# Patient Record
Sex: FEMALE | Race: WHITE | NOT HISPANIC OR LATINO
[De-identification: names, ages, dates, MRNs, and addresses within clinical notes are randomized per-mention and may not be internally consistent; named-entity substitution may affect disease eponyms.]

---

## 2017-06-22 PROBLEM — Z00.00 ENCOUNTER FOR PREVENTIVE HEALTH EXAMINATION: Status: ACTIVE | Noted: 2017-06-22

## 2017-06-30 ENCOUNTER — OTHER (OUTPATIENT)
Age: 72
End: 2017-06-30

## 2017-07-11 ENCOUNTER — APPOINTMENT (OUTPATIENT)
Dept: NEUROSURGERY | Facility: CLINIC | Age: 72
End: 2017-07-11

## 2017-07-11 VITALS — SYSTOLIC BLOOD PRESSURE: 175 MMHG | DIASTOLIC BLOOD PRESSURE: 80 MMHG

## 2017-07-11 VITALS — SYSTOLIC BLOOD PRESSURE: 179 MMHG | DIASTOLIC BLOOD PRESSURE: 77 MMHG

## 2017-07-11 DIAGNOSIS — Z87.19 PERSONAL HISTORY OF OTHER DISEASES OF THE DIGESTIVE SYSTEM: ICD-10-CM

## 2017-07-11 DIAGNOSIS — Z82.61 FAMILY HISTORY OF ARTHRITIS: ICD-10-CM

## 2017-07-11 DIAGNOSIS — Z82.0 FAMILY HISTORY OF EPILEPSY AND OTHER DISEASES OF THE NERVOUS SYSTEM: ICD-10-CM

## 2017-07-11 DIAGNOSIS — Z82.49 FAMILY HISTORY OF ISCHEMIC HEART DISEASE AND OTHER DISEASES OF THE CIRCULATORY SYSTEM: ICD-10-CM

## 2017-07-11 DIAGNOSIS — Z63.4 DISAPPEARANCE AND DEATH OF FAMILY MEMBER: ICD-10-CM

## 2017-07-11 DIAGNOSIS — M33.20 POLYMYOSITIS, ORGAN INVOLVEMENT UNSPECIFIED: ICD-10-CM

## 2017-07-11 DIAGNOSIS — T36.8X5A ADVERSE EFFECT OF OTHER SYSTEMIC ANTIBIOTICS, INITIAL ENCOUNTER: ICD-10-CM

## 2017-07-11 DIAGNOSIS — Z83.518 FAMILY HISTORY OF OTHER SPECIFIED EYE DISORDER: ICD-10-CM

## 2017-07-11 SDOH — SOCIAL STABILITY - SOCIAL INSECURITY: DISSAPEARANCE AND DEATH OF FAMILY MEMBER: Z63.4

## 2018-04-12 ENCOUNTER — OTHER (OUTPATIENT)
Age: 73
End: 2018-04-12

## 2018-07-02 ENCOUNTER — APPOINTMENT (OUTPATIENT)
Dept: NEUROSURGERY | Facility: CLINIC | Age: 73
End: 2018-07-02
Payer: MEDICARE

## 2018-07-02 VITALS
WEIGHT: 132 LBS | OXYGEN SATURATION: 96 % | DIASTOLIC BLOOD PRESSURE: 74 MMHG | HEART RATE: 71 BPM | SYSTOLIC BLOOD PRESSURE: 167 MMHG | TEMPERATURE: 98.6 F

## 2018-07-02 PROCEDURE — 99203 OFFICE O/P NEW LOW 30 MIN: CPT

## 2018-07-02 RX ORDER — METOPROLOL TARTRATE 75 MG/1
TABLET, FILM COATED ORAL
Refills: 0 | Status: DISCONTINUED | COMMUNITY
End: 2018-07-02

## 2018-07-17 ENCOUNTER — OTHER (OUTPATIENT)
Age: 73
End: 2018-07-17

## 2018-07-19 ENCOUNTER — APPOINTMENT (OUTPATIENT)
Dept: NEUROSURGERY | Facility: CLINIC | Age: 73
End: 2018-07-19
Payer: MEDICARE

## 2018-07-19 VITALS
TEMPERATURE: 98 F | DIASTOLIC BLOOD PRESSURE: 95 MMHG | RESPIRATION RATE: 16 BRPM | WEIGHT: 132 LBS | SYSTOLIC BLOOD PRESSURE: 193 MMHG | HEART RATE: 64 BPM | OXYGEN SATURATION: 98 %

## 2018-07-19 PROCEDURE — 99214 OFFICE O/P EST MOD 30 MIN: CPT

## 2018-10-01 ENCOUNTER — APPOINTMENT (OUTPATIENT)
Dept: NEUROSURGERY | Facility: CLINIC | Age: 73
End: 2018-10-01
Payer: MEDICARE

## 2018-10-01 VITALS
HEART RATE: 65 BPM | HEIGHT: 65 IN | WEIGHT: 134 LBS | BODY MASS INDEX: 22.33 KG/M2 | OXYGEN SATURATION: 94 % | TEMPERATURE: 98.4 F | RESPIRATION RATE: 14 BRPM | DIASTOLIC BLOOD PRESSURE: 73 MMHG | SYSTOLIC BLOOD PRESSURE: 163 MMHG

## 2018-10-01 PROCEDURE — 99214 OFFICE O/P EST MOD 30 MIN: CPT

## 2018-10-24 ENCOUNTER — OTHER (OUTPATIENT)
Age: 73
End: 2018-10-24

## 2018-10-24 LAB — PA ADP PRP-ACNC: 175 PRU

## 2018-11-06 ENCOUNTER — APPOINTMENT (OUTPATIENT)
Dept: NEUROSURGERY | Facility: CLINIC | Age: 73
End: 2018-11-06
Payer: COMMERCIAL

## 2018-11-06 VITALS
SYSTOLIC BLOOD PRESSURE: 145 MMHG | RESPIRATION RATE: 17 BRPM | BODY MASS INDEX: 23.45 KG/M2 | HEART RATE: 88 BPM | TEMPERATURE: 97.8 F | WEIGHT: 137.38 LBS | DIASTOLIC BLOOD PRESSURE: 68 MMHG | HEIGHT: 63.98 IN

## 2018-11-06 PROCEDURE — 99214 OFFICE O/P EST MOD 30 MIN: CPT

## 2018-11-20 ENCOUNTER — TRANSCRIPTION ENCOUNTER (OUTPATIENT)
Age: 73
End: 2018-11-20

## 2018-11-20 ENCOUNTER — INPATIENT (INPATIENT)
Facility: HOSPITAL | Age: 73
LOS: 0 days | Discharge: HOME | DRG: 247 | End: 2018-11-21
Attending: INTERNAL MEDICINE | Admitting: INTERNAL MEDICINE
Payer: COMMERCIAL

## 2018-11-20 VITALS
DIASTOLIC BLOOD PRESSURE: 67 MMHG | TEMPERATURE: 98 F | SYSTOLIC BLOOD PRESSURE: 153 MMHG | RESPIRATION RATE: 16 BRPM | HEART RATE: 72 BPM | OXYGEN SATURATION: 97 %

## 2018-11-20 DIAGNOSIS — Z87.19 PERSONAL HISTORY OF OTHER DISEASES OF THE DIGESTIVE SYSTEM: Chronic | ICD-10-CM

## 2018-11-20 DIAGNOSIS — R07.9 CHEST PAIN, UNSPECIFIED: ICD-10-CM

## 2018-11-20 DIAGNOSIS — Z95.1 PRESENCE OF AORTOCORONARY BYPASS GRAFT: Chronic | ICD-10-CM

## 2018-11-20 DIAGNOSIS — Z95.5 PRESENCE OF CORONARY ANGIOPLASTY IMPLANT AND GRAFT: Chronic | ICD-10-CM

## 2018-11-20 LAB
ANION GAP SERPL CALC-SCNC: 10 MMOL/L — SIGNIFICANT CHANGE UP (ref 5–17)
APTT BLD: 31.6 SEC — SIGNIFICANT CHANGE UP (ref 27.5–36.3)
BASOPHILS # BLD AUTO: 0 K/UL — SIGNIFICANT CHANGE UP (ref 0–0.2)
BASOPHILS NFR BLD AUTO: 0.2 % — SIGNIFICANT CHANGE UP (ref 0–2)
BLD GP AB SCN SERPL QL: SIGNIFICANT CHANGE UP
BUN SERPL-MCNC: 12 MG/DL — SIGNIFICANT CHANGE UP (ref 8–20)
CALCIUM SERPL-MCNC: 9.9 MG/DL — SIGNIFICANT CHANGE UP (ref 8.6–10.2)
CHLORIDE SERPL-SCNC: 104 MMOL/L — SIGNIFICANT CHANGE UP (ref 98–107)
CO2 SERPL-SCNC: 25 MMOL/L — SIGNIFICANT CHANGE UP (ref 22–29)
CREAT SERPL-MCNC: 0.43 MG/DL — LOW (ref 0.5–1.3)
EOSINOPHIL # BLD AUTO: 0.1 K/UL — SIGNIFICANT CHANGE UP (ref 0–0.5)
EOSINOPHIL NFR BLD AUTO: 1.1 % — SIGNIFICANT CHANGE UP (ref 0–6)
GLUCOSE SERPL-MCNC: 112 MG/DL — SIGNIFICANT CHANGE UP (ref 70–115)
HCT VFR BLD CALC: 42.7 % — SIGNIFICANT CHANGE UP (ref 37–47)
HGB BLD-MCNC: 14.2 G/DL — SIGNIFICANT CHANGE UP (ref 12–16)
INR BLD: 1.09 RATIO — SIGNIFICANT CHANGE UP (ref 0.88–1.16)
LYMPHOCYTES # BLD AUTO: 1.4 K/UL — SIGNIFICANT CHANGE UP (ref 1–4.8)
LYMPHOCYTES # BLD AUTO: 25.2 % — SIGNIFICANT CHANGE UP (ref 20–55)
MCHC RBC-ENTMCNC: 30.9 PG — SIGNIFICANT CHANGE UP (ref 27–31)
MCHC RBC-ENTMCNC: 33.3 G/DL — SIGNIFICANT CHANGE UP (ref 32–36)
MCV RBC AUTO: 93 FL — SIGNIFICANT CHANGE UP (ref 81–99)
MONOCYTES # BLD AUTO: 0.5 K/UL — SIGNIFICANT CHANGE UP (ref 0–0.8)
MONOCYTES NFR BLD AUTO: 9.9 % — SIGNIFICANT CHANGE UP (ref 3–10)
NEUTROPHILS # BLD AUTO: 3.4 K/UL — SIGNIFICANT CHANGE UP (ref 1.8–8)
NEUTROPHILS NFR BLD AUTO: 63.4 % — SIGNIFICANT CHANGE UP (ref 37–73)
PLATELET # BLD AUTO: 233 K/UL — SIGNIFICANT CHANGE UP (ref 150–400)
POTASSIUM SERPL-MCNC: 4.4 MMOL/L — SIGNIFICANT CHANGE UP (ref 3.5–5.3)
POTASSIUM SERPL-SCNC: 4.4 MMOL/L — SIGNIFICANT CHANGE UP (ref 3.5–5.3)
PROTHROM AB SERPL-ACNC: 12.6 SEC — SIGNIFICANT CHANGE UP (ref 10–12.9)
RBC # BLD: 4.59 M/UL — SIGNIFICANT CHANGE UP (ref 4.4–5.2)
RBC # FLD: 13.2 % — SIGNIFICANT CHANGE UP (ref 11–15.6)
SODIUM SERPL-SCNC: 139 MMOL/L — SIGNIFICANT CHANGE UP (ref 135–145)
TYPE + AB SCN PNL BLD: SIGNIFICANT CHANGE UP
WBC # BLD: 5.4 K/UL — SIGNIFICANT CHANGE UP (ref 4.8–10.8)
WBC # FLD AUTO: 5.4 K/UL — SIGNIFICANT CHANGE UP (ref 4.8–10.8)

## 2018-11-20 PROCEDURE — 93010 ELECTROCARDIOGRAM REPORT: CPT

## 2018-11-20 RX ORDER — ATORVASTATIN CALCIUM 80 MG/1
40 TABLET, FILM COATED ORAL AT BEDTIME
Qty: 0 | Refills: 0 | Status: DISCONTINUED | OUTPATIENT
Start: 2018-11-20 | End: 2018-11-21

## 2018-11-20 RX ORDER — FLUTICASONE PROPIONATE 50 MCG
1 SPRAY, SUSPENSION NASAL DAILY
Qty: 0 | Refills: 0 | Status: DISCONTINUED | OUTPATIENT
Start: 2018-11-20 | End: 2018-11-21

## 2018-11-20 RX ORDER — INFLUENZA VIRUS VACCINE 15; 15; 15; 15 UG/.5ML; UG/.5ML; UG/.5ML; UG/.5ML
0.5 SUSPENSION INTRAMUSCULAR ONCE
Qty: 0 | Refills: 0 | Status: COMPLETED | OUTPATIENT
Start: 2018-11-20 | End: 2018-11-20

## 2018-11-20 RX ORDER — ASPIRIN/CALCIUM CARB/MAGNESIUM 324 MG
81 TABLET ORAL DAILY
Qty: 0 | Refills: 0 | Status: DISCONTINUED | OUTPATIENT
Start: 2018-11-20 | End: 2018-11-21

## 2018-11-20 RX ORDER — ACETAMINOPHEN 500 MG
1000 TABLET ORAL ONCE
Qty: 0 | Refills: 0 | Status: DISCONTINUED | OUTPATIENT
Start: 2018-11-20 | End: 2018-11-21

## 2018-11-20 RX ORDER — AMLODIPINE BESYLATE 2.5 MG/1
2.5 TABLET ORAL DAILY
Qty: 0 | Refills: 0 | Status: DISCONTINUED | OUTPATIENT
Start: 2018-11-20 | End: 2018-11-21

## 2018-11-20 RX ORDER — ATORVASTATIN CALCIUM 80 MG/1
10 TABLET, FILM COATED ORAL AT BEDTIME
Qty: 0 | Refills: 0 | Status: DISCONTINUED | OUTPATIENT
Start: 2018-11-20 | End: 2018-11-20

## 2018-11-20 RX ORDER — CLOPIDOGREL BISULFATE 75 MG/1
75 TABLET, FILM COATED ORAL DAILY
Qty: 0 | Refills: 0 | Status: DISCONTINUED | OUTPATIENT
Start: 2018-11-20 | End: 2018-11-21

## 2018-11-20 RX ADMIN — ATORVASTATIN CALCIUM 40 MILLIGRAM(S): 80 TABLET, FILM COATED ORAL at 22:00

## 2018-11-20 NOTE — H&P ADULT - PMH
CAD (coronary artery disease)    Chest pain Aortic stenosis    CAD (coronary artery disease)    Chest pain    COPD (chronic obstructive pulmonary disease)    Fibromyalgia    Hyperlipidemia    Hypothyroid    IBS (irritable bowel syndrome)    MVP (mitral valve prolapse)    PAD (peripheral artery disease)    Pancreatitis  2011  TIA (transient ischemic attack)

## 2018-11-20 NOTE — DISCHARGE NOTE ADULT - PLAN OF CARE
optimal cardiac function No heavy lifting, driving, sex, tub baths, swimming, or any activity that submerges the lower half of the body in water for 48 hours.  Limited walking and stairs for 48 hours.    Change the bandaid after 24 hours and every 24 hours after that.  Keep the puncture site dry and covered with a bandaid until a scab forms.    Observe the site frequently.  If bleeding or a large lump (the size of a golf ball or bigger) occurs lie flat, apply continuous direct pressure just above the puncture site for at least 10 minutes, and notify your physician immediately.  If the bleeding cannot be controlled, call 911 immediately for assistance.  Notify your physician of pain, swelling or any drainage.    Notify your physician immediately if coldness, numbness, discoloration or pain in your foot occurs. follow up with your primary MD within one week. Follow up with your Cardiologist in 7-10 days  Return to the Emergency dept. for any chest pain or shortness of breath Do not stop Aspirin or Plavix without speaking with cardiologist first

## 2018-11-20 NOTE — DISCHARGE NOTE ADULT - HOSPITAL COURSE
73 year old female h/o CABG 2015 followed by 2 stents to RCA 2016.  She presents with c/o SOB and chest heaviness.  Now s/p PCI with ABELARDO X1 LAD and ABELARDO X1 D1. 73 year old female h/o CABG 2015 followed by 2 stents to RCA 2016.  She presents with c/o SOB and chest heaviness.  Now s/p PCI with ABELARDO X1 LAD and ABELARDO X1 D1.  History of Present Illness:     73 year old female with h/o CABG in 2015 followed by cardiac stents to RCA in 2016 now presents to [facility] with c/o SOB and chest pressure.  Pt transferred to [facility] for LHC  SP: LHC which revealed:  ABELARDO D1 and DESpLAD  denies complaints of chest pain/sob/dizziness/palps overnight   tolerated diet and ambulated   had mild indigestion overnight gone now   HX: CAD with Cabg/ stents and pEF 65% / HTN/HLD/COPD Hypothyroid/TIA/AS  LHC 11/20/18: ABELARDO D1 and pLAD  Hemodynamically stable overnight labs and EKG reviewed/ mild indigestion resolved  R/o hematoma /pseudo R groin  increased tenderness radiates to her hip     Plan:  Continue present meds ASA/ Plavix/ Statin/ CCB no BB secondary to COPD/   sono Right groin will d/c post results  med compliance/ groin care and follow up discussed.   d/c tele    Sono Right groin no pseudo but small 1.7cm hematoma that was compressed till soft  pt tolerated well

## 2018-11-20 NOTE — DISCHARGE NOTE ADULT - MEDICATION SUMMARY - MEDICATIONS TO TAKE
I will START or STAY ON the medications listed below when I get home from the hospital:    aspirin 81 mg oral tablet  -- 1 tab(s) by mouth once a day  -- Indication: For CAD with stents     nitroglycerin 0.4 mg sublingual spray  -- 1 spray(s) under tongue every 5 minutes  -- Indication: For As needed chest pain    atorvastatin 40 mg oral tablet  -- 1 tab(s) by mouth once a day (at bedtime)  -- Indication: For HLD/ new stents    Plavix 75 mg oral tablet  -- 1 tab(s) by mouth once a day  -- Indication: For CAD wtih stents    Norvasc 2.5 mg oral tablet  -- 1 tab(s) by mouth once a day  -- Indication: For HTN    Flonase 50 mcg/inh nasal spray  -- 1 spray(s) into nose once a day  -- Indication: For COPD (chronic obstructive pulmonary disease)

## 2018-11-20 NOTE — DISCHARGE NOTE ADULT - NS AS ACTIVITY OBS
Stairs allowed/Walking-Outdoors allowed/Walking-Indoors allowed/Showering allowed/No Heavy lifting/straining

## 2018-11-20 NOTE — PROGRESS NOTE ADULT - SUBJECTIVE AND OBJECTIVE BOX
Nurse Practitioner Progress note:     INTERVAL HISTORY: 73 year old female with h/o CABG followed by cardiac stent, IBS, HLD, COPD, TIA, fibtomyalgia who c/o sob and chest heaviness.  She was transferred from [facility] for [facility]    MEDICATIONS:  amLODIPine   Tablet 2.5 milliGRAM(s) Oral daily  atorvastatin 40 milliGRAM(s) Oral at bedtime  aspirin  chewable 81 milliGRAM(s) Oral daily  clopidogrel Tablet 75 milliGRAM(s) Oral daily  fluticasone propionate 50 MICROgram(s)/spray Nasal Spray 1 Spray(s) Both Nostrils daily      TELEMETRY: NSR 62 bpm    T(C): 36.9 (11-20-18 @ 12:16), Max: 36.9 (11-20-18 @ 12:16)  HR: 62 (11-20-18 @ 16:50) (62 - 72)  BP: 158/79 (11-20-18 @ 16:50) (153/67 - 173/79)  RR: 16 (11-20-18 @ 16:50) (16 - 16)  SpO2: 98% (11-20-18 @ 16:50) (96% - 98%)  Wt(kg): --    PHYSICAL EXAM:  Appearance: Normal	  Cardiovascular: Normal S1 S2, No JVD, No murmurs, No edema  Respiratory: Lungs clear to auscultation	  Psychiatry: A & O x 3, Mood & affect appropriate  Neurologic: Non-focal, A&O X3.  No neuro deficits  Procedure Site: Right groin site benign.  No bleeding/hematoma/ecchymosis.  + palp pedal pulse.    12 lead EKG:  	NSR 64 bpm.  No acute changes    LABS:	 	                    14.2   5.4   )-----------( 233      ( 20 Nov 2018 12:35 )             42.7     11-20    139  |  104  |  12.0  ----------------------------<  112  4.4   |  25.0  |  0.43<L>    Ca    9.9      20 Nov 2018 12:35        PROCEDURE RESULTS: S/P PCI with ABELARDO X1 LAD and ABELARDO X1 D1 via right radial with angioseal closure    ASSESSMENT/PLAN:   -Admit to 4T	  -Groin precautions  -Bedrest X 3h  -Resume home meds  -Follow up with Dr. Ladd  -Check labs/EKG/site check in AM  -Probable discharge in AM

## 2018-11-20 NOTE — PATIENT PROFILE ADULT - NSASFALLNEEDSASSISTWITH_GEN_A_NUR
Patient is waiting to hear back from her MRI results. Please call when they are ready.    walking/standing

## 2018-11-20 NOTE — DISCHARGE NOTE ADULT - CARE PROVIDER_API CALL
Micheal Ladd), Cardiovascular Disease; Interventional Cardiology  [address] [place] [address]  Phone: [phone]  Fax: [phone]

## 2018-11-20 NOTE — DISCHARGE NOTE ADULT - CARE PLAN
Principal Discharge DX:	CAD (coronary artery disease)  Goal:	optimal cardiac function  Assessment and plan of treatment:	No heavy lifting, driving, sex, tub baths, swimming, or any activity that submerges the lower half of the body in water for 48 hours.  Limited walking and stairs for 48 hours.    Change the bandaid after 24 hours and every 24 hours after that.  Keep the puncture site dry and covered with a bandaid until a scab forms.    Observe the site frequently.  If bleeding or a large lump (the size of a golf ball or bigger) occurs lie flat, apply continuous direct pressure just above the puncture site for at least 10 minutes, and notify your physician immediately.  If the bleeding cannot be controlled, call 911 immediately for assistance.  Notify your physician of pain, swelling or any drainage.    Notify your physician immediately if coldness, numbness, discoloration or pain in your foot occurs. Principal Discharge DX:	CAD (coronary artery disease)  Goal:	optimal cardiac function  Assessment and plan of treatment:	No heavy lifting, driving, sex, tub baths, swimming, or any activity that submerges the lower half of the body in water for 48 hours.  Limited walking and stairs for 48 hours.    Change the bandaid after 24 hours and every 24 hours after that.  Keep the puncture site dry and covered with a bandaid until a scab forms.    Observe the site frequently.  If bleeding or a large lump (the size of a golf ball or bigger) occurs lie flat, apply continuous direct pressure just above the puncture site for at least 10 minutes, and notify your physician immediately.  If the bleeding cannot be controlled, call 911 immediately for assistance.  Notify your physician of pain, swelling or any drainage.    Notify your physician immediately if coldness, numbness, discoloration or pain in your foot occurs.  Assessment and plan of treatment:	follow up with your primary MD within one week. Follow up with your Cardiologist in 7-10 days  Return to the Emergency dept. for any chest pain or shortness of breath  Assessment and plan of treatment:	Do not stop Aspirin or Plavix without speaking with cardiologist first

## 2018-11-20 NOTE — H&P ADULT - FAMILY HISTORY
Father  Still living? No  Family history of heart disease, Age at diagnosis: Age Unknown     Mother  Still living? Yes, Estimated age: 96  Family history of heart disease, Age at diagnosis: Age Unknown

## 2018-11-20 NOTE — DISCHARGE NOTE ADULT - PATIENT PORTAL LINK FT
You can access the [email or web address] Patient Portal, offered by [facility], by registering with the following website: [email or web address]

## 2018-11-20 NOTE — H&P ADULT - NSHPPHYSICALEXAM_GEN_ALL_CORE
ROS:  Resp: Denies dyspnea or SOB at this time  CV: Denies chest pain, palpitations, AGEE  GI: No black/bloody stools  : No hematuria  Heme: No bleeding/bruising problems  Neuro: Denies dizziness    Physical Exam:  Gen: Awake, alert, in no acute distress  Chest: CTA B/L, S1, S2, no murmur, RRR  Abd: Soft +BS  Ext: No edema, + distal pulses  Neuro: A&OX3

## 2018-11-20 NOTE — H&P ADULT - HISTORY OF PRESENT ILLNESS
73 year old female 73 year old female with h/o CABG in 2015 followed by cardiac stents to RCA in 2016 now presents to [facility] with c/o SOB and chest pressure.  Pt transferred to [facility] for LHC

## 2018-11-20 NOTE — H&P ADULT - ASSESSMENT
73 year old female transfer from [facility][1] with c/o chest pain.  For [facility]

## 2018-11-21 VITALS
TEMPERATURE: 98 F | HEART RATE: 68 BPM | RESPIRATION RATE: 18 BRPM | OXYGEN SATURATION: 98 % | DIASTOLIC BLOOD PRESSURE: 64 MMHG | SYSTOLIC BLOOD PRESSURE: 106 MMHG

## 2018-11-21 LAB
ABO RH CONFIRMATION: SIGNIFICANT CHANGE UP
ANION GAP SERPL CALC-SCNC: 14 MMOL/L — SIGNIFICANT CHANGE UP (ref 5–17)
BUN SERPL-MCNC: 18 MG/DL — SIGNIFICANT CHANGE UP (ref 8–20)
CALCIUM SERPL-MCNC: 9.3 MG/DL — SIGNIFICANT CHANGE UP (ref 8.6–10.2)
CHLORIDE SERPL-SCNC: 103 MMOL/L — SIGNIFICANT CHANGE UP (ref 98–107)
CO2 SERPL-SCNC: 22 MMOL/L — SIGNIFICANT CHANGE UP (ref 22–29)
CREAT SERPL-MCNC: 0.39 MG/DL — LOW (ref 0.5–1.3)
GLUCOSE SERPL-MCNC: 94 MG/DL — SIGNIFICANT CHANGE UP (ref 70–115)
HCT VFR BLD CALC: 41.4 % — SIGNIFICANT CHANGE UP (ref 37–47)
HGB BLD-MCNC: 13.7 G/DL — SIGNIFICANT CHANGE UP (ref 12–16)
MAGNESIUM SERPL-MCNC: 2.1 MG/DL — SIGNIFICANT CHANGE UP (ref 1.8–2.6)
MCHC RBC-ENTMCNC: 31 PG — SIGNIFICANT CHANGE UP (ref 27–31)
MCHC RBC-ENTMCNC: 33.1 G/DL — SIGNIFICANT CHANGE UP (ref 32–36)
MCV RBC AUTO: 93.7 FL — SIGNIFICANT CHANGE UP (ref 81–99)
PLATELET # BLD AUTO: 215 K/UL — SIGNIFICANT CHANGE UP (ref 150–400)
POTASSIUM SERPL-MCNC: 4 MMOL/L — SIGNIFICANT CHANGE UP (ref 3.5–5.3)
POTASSIUM SERPL-SCNC: 4 MMOL/L — SIGNIFICANT CHANGE UP (ref 3.5–5.3)
RBC # BLD: 4.42 M/UL — SIGNIFICANT CHANGE UP (ref 4.4–5.2)
RBC # FLD: 13.3 % — SIGNIFICANT CHANGE UP (ref 11–15.6)
SODIUM SERPL-SCNC: 139 MMOL/L — SIGNIFICANT CHANGE UP (ref 135–145)
WBC # BLD: 5.1 K/UL — SIGNIFICANT CHANGE UP (ref 4.8–10.8)
WBC # FLD AUTO: 5.1 K/UL — SIGNIFICANT CHANGE UP (ref 4.8–10.8)

## 2018-11-21 PROCEDURE — 86900 BLOOD TYPING SEROLOGIC ABO: CPT

## 2018-11-21 PROCEDURE — C9600: CPT | Mod: LD

## 2018-11-21 PROCEDURE — 93926 LOWER EXTREMITY STUDY: CPT | Mod: 26,RT

## 2018-11-21 PROCEDURE — 93005 ELECTROCARDIOGRAM TRACING: CPT

## 2018-11-21 PROCEDURE — 93926 LOWER EXTREMITY STUDY: CPT

## 2018-11-21 PROCEDURE — C1760: CPT

## 2018-11-21 PROCEDURE — 99152 MOD SED SAME PHYS/QHP 5/>YRS: CPT

## 2018-11-21 PROCEDURE — 80048 BASIC METABOLIC PNL TOTAL CA: CPT

## 2018-11-21 PROCEDURE — C9601: CPT | Mod: LD

## 2018-11-21 PROCEDURE — C1894: CPT

## 2018-11-21 PROCEDURE — 93459 L HRT ART/GRFT ANGIO: CPT | Mod: XU

## 2018-11-21 PROCEDURE — C1887: CPT

## 2018-11-21 PROCEDURE — 99153 MOD SED SAME PHYS/QHP EA: CPT

## 2018-11-21 PROCEDURE — 85610 PROTHROMBIN TIME: CPT

## 2018-11-21 PROCEDURE — C1725: CPT

## 2018-11-21 PROCEDURE — C1874: CPT

## 2018-11-21 PROCEDURE — 86850 RBC ANTIBODY SCREEN: CPT

## 2018-11-21 PROCEDURE — 36415 COLL VENOUS BLD VENIPUNCTURE: CPT

## 2018-11-21 PROCEDURE — 83735 ASSAY OF MAGNESIUM: CPT

## 2018-11-21 PROCEDURE — 85730 THROMBOPLASTIN TIME PARTIAL: CPT

## 2018-11-21 PROCEDURE — 94640 AIRWAY INHALATION TREATMENT: CPT

## 2018-11-21 PROCEDURE — C1769: CPT

## 2018-11-21 PROCEDURE — 85027 COMPLETE CBC AUTOMATED: CPT

## 2018-11-21 PROCEDURE — 86901 BLOOD TYPING SEROLOGIC RH(D): CPT

## 2018-11-21 PROCEDURE — 93010 ELECTROCARDIOGRAM REPORT: CPT

## 2018-11-21 RX ORDER — ATORVASTATIN CALCIUM 80 MG/1
1 TABLET, FILM COATED ORAL
Qty: 30 | Refills: 5
Start: 2018-11-21 | End: 2019-05-19

## 2018-11-21 RX ADMIN — Medication 1 SPRAY(S): at 10:20

## 2018-11-21 RX ADMIN — CLOPIDOGREL BISULFATE 75 MILLIGRAM(S): 75 TABLET, FILM COATED ORAL at 09:07

## 2018-11-21 RX ADMIN — Medication 81 MILLIGRAM(S): at 09:09

## 2018-11-21 RX ADMIN — AMLODIPINE BESYLATE 2.5 MILLIGRAM(S): 2.5 TABLET ORAL at 09:07

## 2018-11-21 NOTE — PROGRESS NOTE ADULT - SUBJECTIVE AND OBJECTIVE BOX
SUBJECTIVE:  Cardiology NP F/U note:  SP: [facility] which revealed:  ABELARDO D1 and DESpLAD  denies complaints of chest pain/sob/dizziness/palps overnight   tolerated diet and ambulated   had mild indigestion overnight gone now     	  MEDICATIONS:  amLODIPine   Tablet 2.5 milliGRAM(s) Oral daily  acetaminophen  IVPB .. 1000 milliGRAM(s) IV Intermittent once  atorvastatin 40 milliGRAM(s) Oral at bedtime  aspirin  chewable 81 milliGRAM(s) Oral daily  clopidogrel Tablet 75 milliGRAM(s) Oral daily  fluticasone propionate 50 MICROgram(s)/spray Nasal Spray 1 Spray(s) Both Nostrils daily  influenza   Vaccine 0.5 milliLiter(s) IntraMuscular once        PHYSICAL EXAM:    T(C): 36.9 (18 @ 06:55), Max: 36.9 (18 @ 12:16)  HR: 62 (18 @ 06:55) (62 - 72)  BP: 124/56 (18 @ 06:55) (107/68 - 173/79)  RR: 16 (18 @ 06:55) (16 - 16)  SpO2: 98% (18 @ 06:55) (96% - 98%)  Wt(kg): --    I&O's Summary    2018 07:01  -  2018 07:00  --------------------------------------------------------  IN: 370 mL / OUT: 0 mL / NET: 370 mL        Daily     Daily Weight in k.9 (2018 05:49)    Appearance: Normal	  HEENT:   Normal oral mucosa,   Lymphatic: No lymphadenopathy  Cardiovascular: Normal S1 S2,RRR 70 No JVD, No murmurs, No edema  Respiratory: Lungs clear to auscultation	  Psychiatry: A & O x 3, Mood & affect appropriate  Gastrointestinal:  Soft, Non-tender, + BS	  Skin: warm and dry  Neurologic: Non-focal  Extremities: Normal range of motion,:  Right Groin firm with increased tenderness radiating to her hip   dressing removed no active bleeding   Vascular: Peripheral pulses palpable 2+ bilaterally    TELEMETRY: 	  RSR 70's no events   ECG:  	RSR B 56 normal record  RADIOLOGY:   DIAGNOSTIC TESTING:  [ ] Echocardiogram:  [ X]  Catheterization:  < from: Cardiac Cath Lab - Adult (18 @ 15:04) >  VENTRICLES: There were no left ventricular global or regional wall motion  abnormalities. Global left ventricular function was normal. EF estimated  was 65 %.  VALVES: MITRAL VALVE: The mitral valve exhibited no regurgitation.  CORONARY VESSELS: The coronary circulation is right dominant.  LM:   --  LM: There was a tubular 0 % stenosis at the site of a prior  stent.  LAD:   --  Ostial LAD: There was a tubular 0 % stenosis at the site of a  prior stent.  --  Proximal LAD: There was a tubular 70 % stenosis.  --  Mid LAD: There was a 100 % stenosis.  --  D1: There was a tubular 85 % stenosis.  CX:   --  Proximal circumflex: There was a tubular 0 % stenosis at the site  of a prior stent.  RI:   --  Proximal ramus intermedius: There was a 100 % stenosis.  RCA:   --  Distal RCA: There was a tubular 20 % stenosis at the proximal  margin of the stented segment. In a second lesion, there was a discrete 10  % stenosis at the site of a prior stent.  --  RPDA: There was a tubular 25 % stenosis at the site of a prior stent.  GRAFTS:   --  Graft to the mid LAD: The graft was a LIMA. It was normal.  --  Graft to the ramus intermedius: The graft was a saphenous vein graft  from the aorta. Graft angiography showed minor luminal irregularities.  --  Graft to the RPDA: The graft was a saphenous vein graft from the aorta.  There was a 100 % stenosis at the proximal anastomosis.  COMPLICATIONS: No complications occurred during the cath lab visit.  SUMMARY:  1ST LESION INTERVENTIONS: A successful drug-eluting stent was performed on  the 85 % lesion in the 1st diagonal. Following intervention there was an  excellent angiographic appearance with a 0 % residual stenosis.  2ND LESION INTERVENTIONS: A successful drug-eluting stent was performed on  the 70 % lesion in the proximal LAD. Following intervention there was an  excellent angiographic appearance with a 0 % residual stenosis.  INTERVENTIONAL RECOMMENDATIONS: Add clopidogrel (Plavix), 75 mg, PO, daily.  Add aspirin, 81 mg, PO, daily.    < end of copied text >    [ ] Stress Test:    OTHER: 	    LABS:	 	    CARDIAC MARKERS:                                  13.7   5.1   )-----------( 215      ( 2018 05:53 )             41.4         139  |  103  |  18.0  ----------------------------<  94  4.0   |  22.0  |  0.39<L>    Ca    9.3      2018 05:53  Mg     2.1           ASSESSMENT:  73 year old female with h/o CABG in  followed by cardiac stents to RCA in  now presents to [facility] with c/o SOB and chest pressure.  Pt transferred to [facility] for C  HX: CAD with Cabg/ stents and pEF 65% / HTN/HLD/COPD Hypothyroid/TIA/AS  C 18: ABELARDO D1 and pLAD  Hemodynamically stable overnight labs and EKG reviewed/ mild indigestion resolved  R/o hematoma /pseudo R groin  increased tenderness radiates to her hip     Plan:  Continue present meds ASA/ Plavix/ Statin/ CCB no BB secondary to COPD/   sono Right groin will d/c post results  med compliance/ groin care and follow up discussed.   d/c tele SUBJECTIVE:  Cardiology NP F/U note:  SP: [facility] which revealed:  ABELARDO D1 and DESpLAD  denies complaints of chest pain/sob/dizziness/palps overnight   tolerated diet and ambulated   had mild indigestion overnight gone now     	  MEDICATIONS:  amLODIPine   Tablet 2.5 milliGRAM(s) Oral daily  acetaminophen  IVPB .. 1000 milliGRAM(s) IV Intermittent once  atorvastatin 40 milliGRAM(s) Oral at bedtime  aspirin  chewable 81 milliGRAM(s) Oral daily  clopidogrel Tablet 75 milliGRAM(s) Oral daily  fluticasone propionate 50 MICROgram(s)/spray Nasal Spray 1 Spray(s) Both Nostrils daily  influenza   Vaccine 0.5 milliLiter(s) IntraMuscular once        PHYSICAL EXAM:    T(C): 36.9 (18 @ 06:55), Max: 36.9 (18 @ 12:16)  HR: 62 (18 @ 06:55) (62 - 72)  BP: 124/56 (18 @ 06:55) (107/68 - 173/79)  RR: 16 (18 @ 06:55) (16 - 16)  SpO2: 98% (18 @ 06:55) (96% - 98%)  Wt(kg): --    I&O's Summary    2018 07:01  -  2018 07:00  --------------------------------------------------------  IN: 370 mL / OUT: 0 mL / NET: 370 mL        Daily     Daily Weight in k.9 (2018 05:49)    Appearance: Normal	  HEENT:   Normal oral mucosa,   Lymphatic: No lymphadenopathy  Cardiovascular: Normal S1 S2,RRR 70 No JVD, No murmurs, No edema  Respiratory: Lungs clear to auscultation	  Psychiatry: A & O x 3, Mood & affect appropriate  Gastrointestinal:  Soft, Non-tender, + BS	  Skin: warm and dry  Neurologic: Non-focal  Extremities: Normal range of motion,:  Right Groin firm with increased tenderness radiating to her hip   dressing removed no active bleeding   Vascular: Peripheral pulses palpable 2+ bilaterally    TELEMETRY: 	  RSR 70's no events   ECG:  	RSR B 56 normal record  RADIOLOGY:   DIAGNOSTIC TESTING:  [ ] Echocardiogram:  [ X]  Catheterization:  < from: Cardiac Cath Lab - Adult (18 @ 15:04) >  VENTRICLES: There were no left ventricular global or regional wall motion  abnormalities. Global left ventricular function was normal. EF estimated  was 65 %.  VALVES: MITRAL VALVE: The mitral valve exhibited no regurgitation.  CORONARY VESSELS: The coronary circulation is right dominant.  LM:   --  LM: There was a tubular 0 % stenosis at the site of a prior  stent.  LAD:   --  Ostial LAD: There was a tubular 0 % stenosis at the site of a  prior stent.  --  Proximal LAD: There was a tubular 70 % stenosis.  --  Mid LAD: There was a 100 % stenosis.  --  D1: There was a tubular 85 % stenosis.  CX:   --  Proximal circumflex: There was a tubular 0 % stenosis at the site  of a prior stent.  RI:   --  Proximal ramus intermedius: There was a 100 % stenosis.  RCA:   --  Distal RCA: There was a tubular 20 % stenosis at the proximal  margin of the stented segment. In a second lesion, there was a discrete 10  % stenosis at the site of a prior stent.  --  RPDA: There was a tubular 25 % stenosis at the site of a prior stent.  GRAFTS:   --  Graft to the mid LAD: The graft was a LIMA. It was normal.  --  Graft to the ramus intermedius: The graft was a saphenous vein graft  from the aorta. Graft angiography showed minor luminal irregularities.  --  Graft to the RPDA: The graft was a saphenous vein graft from the aorta.  There was a 100 % stenosis at the proximal anastomosis.  COMPLICATIONS: No complications occurred during the cath lab visit.  SUMMARY:  1ST LESION INTERVENTIONS: A successful drug-eluting stent was performed on  the 85 % lesion in the 1st diagonal. Following intervention there was an  excellent angiographic appearance with a 0 % residual stenosis.  2ND LESION INTERVENTIONS: A successful drug-eluting stent was performed on  the 70 % lesion in the proximal LAD. Following intervention there was an  excellent angiographic appearance with a 0 % residual stenosis.  INTERVENTIONAL RECOMMENDATIONS: Add clopidogrel (Plavix), 75 mg, PO, daily.  Add aspirin, 81 mg, PO, daily.    < end of copied text >    [ ] Stress Test:    OTHER: 	    LABS:	 	    CARDIAC MARKERS:                                  13.7   5.1   )-----------( 215      ( 2018 05:53 )             41.4         139  |  103  |  18.0  ----------------------------<  94  4.0   |  22.0  |  0.39<L>    Ca    9.3      2018 05:53  Mg     2.1           ASSESSMENT:  73 year old female with h/o CABG in  followed by cardiac stents to RCA in  now presents to [facility] with c/o SOB and chest pressure.  Pt transferred to [facility] for C  HX: CAD with Cabg/ stents and pEF 65% / HTN/HLD/COPD Hypothyroid/TIA/AS  LHC 18: ABELARDO D1 and pLAD  Hemodynamically stable overnight labs and EKG reviewed/ mild indigestion resolved  R/o hematoma /pseudo R groin  increased tenderness radiates to her hip     Plan:  Continue present meds ASA/ Plavix/ Statin/ CCB no BB secondary to COPD/   sono Right groin will d/c post results  med compliance/ groin care and follow up discussed.   d/c tele  SONO: sm hematoma 1.7cm compressed till soft .

## 2018-11-26 ENCOUNTER — APPOINTMENT (OUTPATIENT)
Dept: OBGYN | Facility: CLINIC | Age: 73
End: 2018-11-26

## 2018-11-26 ENCOUNTER — INPATIENT (INPATIENT)
Facility: HOSPITAL | Age: 73
LOS: 1 days | Discharge: HOME | DRG: 392 | End: 2018-11-28
Attending: FAMILY MEDICINE | Admitting: INTERNAL MEDICINE
Payer: COMMERCIAL

## 2018-11-26 VITALS
HEART RATE: 72 BPM | TEMPERATURE: 98 F | RESPIRATION RATE: 18 BRPM | WEIGHT: 134.04 LBS | DIASTOLIC BLOOD PRESSURE: 85 MMHG | HEIGHT: 65 IN | SYSTOLIC BLOOD PRESSURE: 182 MMHG | OXYGEN SATURATION: 97 %

## 2018-11-26 DIAGNOSIS — Z95.1 PRESENCE OF AORTOCORONARY BYPASS GRAFT: Chronic | ICD-10-CM

## 2018-11-26 DIAGNOSIS — Z95.5 PRESENCE OF CORONARY ANGIOPLASTY IMPLANT AND GRAFT: Chronic | ICD-10-CM

## 2018-11-26 DIAGNOSIS — Z87.19 PERSONAL HISTORY OF OTHER DISEASES OF THE DIGESTIVE SYSTEM: Chronic | ICD-10-CM

## 2018-11-26 PROBLEM — I35.0 NONRHEUMATIC AORTIC (VALVE) STENOSIS: Chronic | Status: ACTIVE | Noted: 2018-11-20

## 2018-11-26 PROBLEM — G45.9 TRANSIENT CEREBRAL ISCHEMIC ATTACK, UNSPECIFIED: Chronic | Status: ACTIVE | Noted: 2018-11-20

## 2018-11-26 PROBLEM — J44.9 CHRONIC OBSTRUCTIVE PULMONARY DISEASE, UNSPECIFIED: Chronic | Status: ACTIVE | Noted: 2018-11-20

## 2018-11-26 PROBLEM — E03.9 HYPOTHYROIDISM, UNSPECIFIED: Chronic | Status: ACTIVE | Noted: 2018-11-20

## 2018-11-26 PROBLEM — K58.9 IRRITABLE BOWEL SYNDROME, UNSPECIFIED: Chronic | Status: ACTIVE | Noted: 2018-11-20

## 2018-11-26 PROBLEM — R07.9 CHEST PAIN, UNSPECIFIED: Chronic | Status: ACTIVE | Noted: 2018-11-20

## 2018-11-26 PROBLEM — K85.90 ACUTE PANCREATITIS WITHOUT NECROSIS OR INFECTION, UNSPECIFIED: Chronic | Status: ACTIVE | Noted: 2018-11-20

## 2018-11-26 PROBLEM — I34.1 NONRHEUMATIC MITRAL (VALVE) PROLAPSE: Chronic | Status: ACTIVE | Noted: 2018-11-20

## 2018-11-26 PROBLEM — K58.9 IRRITABLE BOWEL SYNDROME WITHOUT DIARRHEA: Chronic | Status: ACTIVE | Noted: 2018-11-20

## 2018-11-26 PROBLEM — M79.7 FIBROMYALGIA: Chronic | Status: ACTIVE | Noted: 2018-11-20

## 2018-11-26 PROBLEM — I73.9 PERIPHERAL VASCULAR DISEASE, UNSPECIFIED: Chronic | Status: ACTIVE | Noted: 2018-11-20

## 2018-11-26 LAB
ANION GAP SERPL CALC-SCNC: 13 MMOL/L — SIGNIFICANT CHANGE UP (ref 5–17)
APTT BLD: 26.2 SEC — LOW (ref 27.5–36.3)
BASOPHILS # BLD AUTO: 0 K/UL — SIGNIFICANT CHANGE UP (ref 0–0.2)
BASOPHILS NFR BLD AUTO: 0.3 % — SIGNIFICANT CHANGE UP (ref 0–2)
BUN SERPL-MCNC: 14 MG/DL — SIGNIFICANT CHANGE UP (ref 8–20)
CALCIUM SERPL-MCNC: 10 MG/DL — SIGNIFICANT CHANGE UP (ref 8.6–10.2)
CHLORIDE SERPL-SCNC: 104 MMOL/L — SIGNIFICANT CHANGE UP (ref 98–107)
CO2 SERPL-SCNC: 22 MMOL/L — SIGNIFICANT CHANGE UP (ref 22–29)
CREAT SERPL-MCNC: 0.35 MG/DL — LOW (ref 0.5–1.3)
EOSINOPHIL # BLD AUTO: 0.1 K/UL — SIGNIFICANT CHANGE UP (ref 0–0.5)
EOSINOPHIL NFR BLD AUTO: 1.5 % — SIGNIFICANT CHANGE UP (ref 0–6)
GLUCOSE SERPL-MCNC: 92 MG/DL — SIGNIFICANT CHANGE UP (ref 70–115)
HCT VFR BLD CALC: 39.1 % — SIGNIFICANT CHANGE UP (ref 37–47)
HGB BLD-MCNC: 13 G/DL — SIGNIFICANT CHANGE UP (ref 12–16)
INR BLD: 1.07 RATIO — SIGNIFICANT CHANGE UP (ref 0.88–1.16)
LYMPHOCYTES # BLD AUTO: 1.8 K/UL — SIGNIFICANT CHANGE UP (ref 1–4.8)
LYMPHOCYTES # BLD AUTO: 27.4 % — SIGNIFICANT CHANGE UP (ref 20–55)
MCHC RBC-ENTMCNC: 30.5 PG — SIGNIFICANT CHANGE UP (ref 27–31)
MCHC RBC-ENTMCNC: 33.2 G/DL — SIGNIFICANT CHANGE UP (ref 32–36)
MCV RBC AUTO: 91.8 FL — SIGNIFICANT CHANGE UP (ref 81–99)
MONOCYTES # BLD AUTO: 0.6 K/UL — SIGNIFICANT CHANGE UP (ref 0–0.8)
MONOCYTES NFR BLD AUTO: 9.3 % — SIGNIFICANT CHANGE UP (ref 3–10)
NEUTROPHILS # BLD AUTO: 4 K/UL — SIGNIFICANT CHANGE UP (ref 1.8–8)
NEUTROPHILS NFR BLD AUTO: 61.3 % — SIGNIFICANT CHANGE UP (ref 37–73)
NT-PROBNP SERPL-SCNC: 223 PG/ML — SIGNIFICANT CHANGE UP (ref 0–300)
PLATELET # BLD AUTO: 241 K/UL — SIGNIFICANT CHANGE UP (ref 150–400)
POTASSIUM SERPL-MCNC: 3.8 MMOL/L — SIGNIFICANT CHANGE UP (ref 3.5–5.3)
POTASSIUM SERPL-SCNC: 3.8 MMOL/L — SIGNIFICANT CHANGE UP (ref 3.5–5.3)
PROTHROM AB SERPL-ACNC: 12.3 SEC — SIGNIFICANT CHANGE UP (ref 10–12.9)
RBC # BLD: 4.26 M/UL — LOW (ref 4.4–5.2)
RBC # FLD: 13.1 % — SIGNIFICANT CHANGE UP (ref 11–15.6)
SODIUM SERPL-SCNC: 139 MMOL/L — SIGNIFICANT CHANGE UP (ref 135–145)
TROPONIN T SERPL-MCNC: 0.02 NG/ML — SIGNIFICANT CHANGE UP (ref 0–0.06)
WBC # BLD: 6.5 K/UL — SIGNIFICANT CHANGE UP (ref 4.8–10.8)
WBC # FLD AUTO: 6.5 K/UL — SIGNIFICANT CHANGE UP (ref 4.8–10.8)

## 2018-11-26 PROCEDURE — 93010 ELECTROCARDIOGRAM REPORT: CPT

## 2018-11-26 PROCEDURE — 71045 X-RAY EXAM CHEST 1 VIEW: CPT | Mod: 26

## 2018-11-26 PROCEDURE — 99285 EMERGENCY DEPT VISIT HI MDM: CPT

## 2018-11-26 NOTE — ED PROVIDER NOTE - MEDICAL DECISION MAKING DETAILS
exertional cp after ercent cath will need cardiac rule out a/w trouble aswallowing htn medication hasd her asa today too high risk detioration if sent home pt and pts daughter agree with this

## 2018-11-26 NOTE — ED PROVIDER NOTE - CONSTITUTIONAL, MLM
normal... Thin appearing, awake, alert, oriented to person, place, time/situation and in no apparent distress.

## 2018-11-26 NOTE — ED PROVIDER NOTE - OBJECTIVE STATEMENT
72 y/o M pt with hx of CAD with stents, CABG x3, COPD, HLD, hypothyroidism, pancreatitis, TIA, appendectomy, cholecystectomy presents to ED c/o CP and SOB that has gradually worsened since stent placement by Dr. Jha 6 days ago. She describe CP as a heaviness and has had a cough for the last few days. Pt notes difficulty swallowing and difficulty breathing through her nose due to phlegm. Pt's daughter states she has felt very weak, she was almost too tired to receive communion at [employer] yesterday. Pt's daughter also notes orthopnea. denies fever. denies HA or neck pain. no hemoptysis. no abd pain. no n/v/d. no urinary f/u/d. no back pain. no motor or sensory deficits. denies illicit drug use. no recent travel. no rash. no other acute issues symptoms or concerns   Cardio: Dr. Spence

## 2018-11-26 NOTE — ED ADULT NURSE NOTE - NSIMPLEMENTINTERV_GEN_ALL_ED
Called pt, discussed Dr Waterman recommendations, he would like Dr Waterman to prescribed it for him.     New Rx for Pletal called in to [facility]    Implemented All Universal Safety Interventions:  [place] to call system. Call bell, personal items and telephone within reach. Instruct patient to call for assistance. Room bathroom lighting operational. Non-slip footwear when patient is off stretcher. Physically safe environment: no spills, clutter or unnecessary equipment. Stretcher in lowest position, wheels locked, appropriate side rails in place.

## 2018-11-26 NOTE — ED ADULT TRIAGE NOTE - CHIEF COMPLAINT QUOTE
Pt presents with daughter ambulatory with c/o shortness of breath and difficulty swallowing. Pt had 2 cardiac stents placed on Tuesday by Dr. Ellison.

## 2018-11-26 NOTE — ED PROVIDER NOTE - PMH
Aortic stenosis    CAD (coronary artery disease)    Chest pain    COPD (chronic obstructive pulmonary disease)    Fibromyalgia    Hyperlipidemia    Hypothyroid    IBS (irritable bowel syndrome)    MVP (mitral valve prolapse)    PAD (peripheral artery disease)    Pancreatitis  2011  TIA (transient ischemic attack)

## 2018-11-26 NOTE — ED ADULT NURSE NOTE - OBJECTIVE STATEMENT
pt came in c/o chest pressure and SOB since two weeks ago since pt got two stents placed here at [place]. pt in no current distress or pain at this time. pt relates pain to a muscle ache. VSS afebrile. pt on RA saturating 97%. pt received surgery through right femoral, clean dry intact. pt educated on plan of care, pt able to successfully teach back plan of care to RN, RN will continue to reeducate pt during hospital stay.

## 2018-11-27 DIAGNOSIS — I24.9 ACUTE ISCHEMIC HEART DISEASE, UNSPECIFIED: ICD-10-CM

## 2018-11-27 LAB
BLD GP AB SCN SERPL QL: SIGNIFICANT CHANGE UP
TROPONIN T SERPL-MCNC: <0.01 NG/ML — SIGNIFICANT CHANGE UP (ref 0–0.06)
TROPONIN T SERPL-MCNC: <0.01 NG/ML — SIGNIFICANT CHANGE UP (ref 0–0.06)
TYPE + AB SCN PNL BLD: SIGNIFICANT CHANGE UP

## 2018-11-27 PROCEDURE — 12345: CPT | Mod: NC,GC

## 2018-11-27 PROCEDURE — 99223 1ST HOSP IP/OBS HIGH 75: CPT

## 2018-11-27 PROCEDURE — 70491 CT SOFT TISSUE NECK W/DYE: CPT | Mod: 26

## 2018-11-27 PROCEDURE — 74230 X-RAY XM SWLNG FUNCJ C+: CPT | Mod: 26

## 2018-11-27 RX ORDER — ENOXAPARIN SODIUM 100 MG/ML
40 INJECTION SUBCUTANEOUS DAILY
Qty: 0 | Refills: 0 | Status: DISCONTINUED | OUTPATIENT
Start: 2018-11-27 | End: 2018-11-28

## 2018-11-27 RX ORDER — PANTOPRAZOLE SODIUM 20 MG/1
40 TABLET, DELAYED RELEASE ORAL
Qty: 0 | Refills: 0 | Status: DISCONTINUED | OUTPATIENT
Start: 2018-11-27 | End: 2018-11-28

## 2018-11-27 RX ORDER — LEVOTHYROXINE SODIUM 125 MCG
125 TABLET ORAL DAILY
Qty: 0 | Refills: 0 | Status: DISCONTINUED | OUTPATIENT
Start: 2018-11-27 | End: 2018-11-28

## 2018-11-27 RX ORDER — ATORVASTATIN CALCIUM 80 MG/1
40 TABLET, FILM COATED ORAL AT BEDTIME
Qty: 0 | Refills: 0 | Status: DISCONTINUED | OUTPATIENT
Start: 2018-11-27 | End: 2018-11-28

## 2018-11-27 RX ORDER — IPRATROPIUM/ALBUTEROL SULFATE 18-103MCG
3 AEROSOL WITH ADAPTER (GRAM) INHALATION EVERY 6 HOURS
Qty: 0 | Refills: 0 | Status: DISCONTINUED | OUTPATIENT
Start: 2018-11-27 | End: 2018-11-28

## 2018-11-27 RX ORDER — ASPIRIN/CALCIUM CARB/MAGNESIUM 324 MG
81 TABLET ORAL DAILY
Qty: 0 | Refills: 0 | Status: DISCONTINUED | OUTPATIENT
Start: 2018-11-27 | End: 2018-11-28

## 2018-11-27 RX ORDER — CLOPIDOGREL BISULFATE 75 MG/1
75 TABLET, FILM COATED ORAL DAILY
Qty: 0 | Refills: 0 | Status: DISCONTINUED | OUTPATIENT
Start: 2018-11-27 | End: 2018-11-28

## 2018-11-27 RX ORDER — AMLODIPINE BESYLATE 2.5 MG/1
2.5 TABLET ORAL DAILY
Qty: 0 | Refills: 0 | Status: DISCONTINUED | OUTPATIENT
Start: 2018-11-27 | End: 2018-11-28

## 2018-11-27 RX ORDER — INFLUENZA VIRUS VACCINE 15; 15; 15; 15 UG/.5ML; UG/.5ML; UG/.5ML; UG/.5ML
0.5 SUSPENSION INTRAMUSCULAR ONCE
Qty: 0 | Refills: 0 | Status: COMPLETED | OUTPATIENT
Start: 2018-11-27 | End: 2018-11-27

## 2018-11-27 RX ADMIN — Medication 125 MICROGRAM(S): at 05:14

## 2018-11-27 RX ADMIN — ATORVASTATIN CALCIUM 40 MILLIGRAM(S): 80 TABLET, FILM COATED ORAL at 22:25

## 2018-11-27 RX ADMIN — AMLODIPINE BESYLATE 2.5 MILLIGRAM(S): 2.5 TABLET ORAL at 05:14

## 2018-11-27 RX ADMIN — ENOXAPARIN SODIUM 40 MILLIGRAM(S): 100 INJECTION SUBCUTANEOUS at 18:02

## 2018-11-27 NOTE — SWALLOW BEDSIDE ASSESSMENT ADULT - SWALLOW EVAL: DIAGNOSIS
Oral and pharyngeal stage of swallow judged to be WFL for thin liquids. Suspect pharyngeal dysphagia for puree consistencies, pt reports a globus sensation in throat area.

## 2018-11-27 NOTE — SWALLOW VFSS/MBS ASSESSMENT ADULT - PHARYNGEAL PHASE COMMENTS
Noted po moving around the outer parameter of soft tissue located around C2-C3 of the pharynx. Pt continues to c/o globus sensation in pharynx despite no stasis noted

## 2018-11-27 NOTE — ED ADULT NURSE REASSESSMENT NOTE - NS ED NURSE REASSESS COMMENT FT1
Patient received at 0700; awake; alert and oriented x4. Denies any pain or discomfort this time. Denies SOB, dizziness. No distress noted. VSS. Respirations unlabored. Report received at bedside. Cardiac monitor in place. NSR. Call bell and personal items in reach. Continue to monitor patient and maintain safety.

## 2018-11-27 NOTE — CONSULT NOTE ADULT - ASSESSMENT
In short, this is a 73-year-old woman with significant cardiovascular disease (CAD, PAD, TIA) and COPD who presents with complaints of chest heaviness/pressure and new onset dysphagia.  The etiology of her dysphagia is not clear based on the available data and her description of her symptoms.  Coupled with her chest heaviness, this may be simply an upper respiratory infection with increased mucus viscosity causing her dysphagia.  Pill esophagitis or worsened GERD are also a possibilities  Given the recent PCI/stent placement and chest heaviness, direct visualization with an endoscope may not prudent at this time.  Noninvasive means of evaluation with a MBSS and CXR are appropriate first steps.  Cardiology clearance for an EGD would be an absolute necessity prior to planning said procedure.    Recommendations:  - 2-view chest x-ray  - Modified barium swallow study with SLP evaluation  - Start pantoprazole 40 mg daily

## 2018-11-27 NOTE — SWALLOW BEDSIDE ASSESSMENT ADULT - SLP PERTINENT HISTORY OF CURRENT PROBLEM
As per h&p: ED complaining of chest pressure x 1 week. Patient said she had a 2 stents placed a week ago and she has been having chest pressure since then; now worsening. Chest pressure is on the mid chest, not exertional, non-radiating. She reported associated shortness of breath. Patient also reported difficulty swallowing x 1 day and choking sensation with solid food.Pt reports yesterday eating breakfast, hardboiled egg with toast & was unable to swallow the food , started to choke with globus sensation in the throat area & has had difficulty swallowing since. Pt also reports unable to clear phlegm in throat area and feels more stuffy in the nares. Pt also reports vocal hoarseness since choking episode Thin liquids are easier to swallow per pt.

## 2018-11-27 NOTE — H&P ADULT - HISTORY OF PRESENT ILLNESS
72 y/o patient with PMHx of CAD with stents, CABG x3, HTN, COPD,  hypothyroidism, came to the ED complaining of chest pressure x 1 week. Patient said she had a 2 stents placed a week ago and she has been having chest pressure since then; now worsening. Chest pressure is on the mid chest, not exertional, non-radiating. She reported associated shortness of breath. Patient also reported difficulty swallowing x 1 day and choking sensation with solid food. Patient has no fever, diaphoresis, palpitation, abdominal pain, nausea/vomiting, change in bowel/ urinary habit, HA, no recent travel, leg swelling.

## 2018-11-27 NOTE — SWALLOW BEDSIDE ASSESSMENT ADULT - SLP GENERAL OBSERVATIONS
Received pt in bed awake, alert Ox4. Currently reports a globus sensation in throat area prior to trials being given

## 2018-11-27 NOTE — CONSULT NOTE ADULT - SUBJECTIVE AND OBJECTIVE BOX
HISTORY OF PRESENT ILLNESS: This is a 73y old Female with a past medical history significant for coronary artery disease s/p recent PCI, CABG, PAD, AS, MVP, COPD, and PUD who presented to the ED with complaints of worsening chest pressure that began during her previous hospitalization after her stent placements and dysphagia.  The patient reports that the dysphagia is a new symptom for her.  She reports that she has developed a significant difficulty swallowing solids, including food and pills.  She also carries a diagnosis of GERD for which she has been taking Pepcid for some time.  She denies any regurgitation of foods but does report water brash.  On the day of presentation, while eating her breakfast (toast and a hard boiled egg) she felt as though the food was stuck in her throat, causing her to gag and feel acutely dyspneic.  Over time, her symptoms subsided, but nothing specifically seems to make her symptoms better or worse.  Currently, she feels she can only tolerate water.  Lastly, she reports hoarseness, another new symptom for her.  She reports having undergone numerous EGDs and colonoscopies in the past, most recently about 7 years ago.    REVIEW OF SYSTEMS:  Constitutional:  No unintentional weight loss, fevers, chills or night sweats	  Eyes: No eye pain, redness, discharge, or proptosis  ENMT: No sore throat, ear pain, mouth sores, or swollen glands in the neck  Respiratory: No dyspnea, cough or wheezing  Cardiovascular: No chest pain, dyspnea on exertion, or orthopnea  Gastrointestinal:	Please see HPI  Genitourinary: No dysuria or hematuria  Neurological:	 No changes in sleep/wake cycle, convulsions, confusion, dizziness or lightheadedness  Psychiatric: No changes in personality or emotional problems   Hematology: No easy bruising   Endocrine: No hot or cold flashes or deepening of voice	  All other review of systems were completed and were otherwise negative save what is reported in the HPI.    PAST MEDICAL/SURGICAL HISTORY:  PAD (peripheral artery disease)  MVP (mitral valve prolapse)  Aortic stenosis  Pancreatitis: 2011  Fibromyalgia  Hypothyroid  TIA (transient ischemic attack)  COPD (chronic obstructive pulmonary disease)  Hyperlipidemia  IBS (irritable bowel syndrome)  CAD (coronary artery disease)  Chest pain  H/O appendicitis  H/O cholecystitis  Coronary stent patent: 2016 RCA  S/P CABG x 3: 2015    SOCIAL HISTORY:  - TOBACCO: Quit smoking 1989  - ALCOHOL: Denies  - ILLICIT DRUG USE: Denies    FAMILY HISTORY:  No known history of gastrointestinal or liver disease;  Family history of heart disease (Father)    HOME MEDICATIONS:  aspirin 81 mg oral tablet: 1 tab(s) orally once a day (20 Nov 2018 12:32)  Flonase 50 mcg/inh nasal spray: 1 spray(s) nasal once a day (20 Nov 2018 12:32)  nitroglycerin 0.4 mg sublingual spray: 1 spray(s) sublingual every 5 minutes (20 Nov 2018 12:20)  Norvasc 2.5 mg oral tablet: 1 tab(s) orally once a day (20 Nov 2018 12:32)  Plavix 75 mg oral tablet: 1 tab(s) orally once a day (20 Nov 2018 12:32)    INPATIENT MEDICATIONS:  MEDICATIONS  (STANDING):  amLODIPine   Tablet 2.5 milliGRAM(s) Oral daily  aspirin  chewable 81 milliGRAM(s) Oral daily  atorvastatin 40 milliGRAM(s) Oral at bedtime  clopidogrel Tablet 75 milliGRAM(s) Oral daily  enoxaparin Injectable 40 milliGRAM(s) SubCutaneous daily  levothyroxine 125 MICROGram(s) Oral daily    MEDICATIONS  (PRN):  ALBUTerol/ipratropium for Nebulization 3 milliLiter(s) Nebulizer every 6 hours PRN Shortness of Breath and/or Wheezing    ALLERGIES:  Bactrim (Rash)  chocolate (Other)  Cipro (Rash)  codeine (Rash)  sulfa drugs (Faint)    VITAL SIGNS LAST 24 HOURS:  T(C): 36.7 (27 Nov 2018 05:14), Max: 36.8 (26 Nov 2018 20:58)  T(F): 98.1 (27 Nov 2018 05:14), Max: 98.2 (26 Nov 2018 20:58)  HR: 62 (27 Nov 2018 05:14) (62 - 72)  BP: 148/72 (27 Nov 2018 05:14) (148/72 - 182/85)  RR: 18 (27 Nov 2018 05:14) (18 - 18)  SpO2: 97% (27 Nov 2018 05:14) (97% - 97%)    PHYSICAL EXAM:  Constitutional: Well-developed, well-nourished, elderly  woman in no apparent distress  Eyes: Sclerae anicteric, conjunctivae normal  ENMT: Mucus membranes moist, no oropharyngeal thrush noted; no significant PND  Neck: No thyroid nodules appreciated, no significant cervical or supraclavicular lymphadenopathy  Respiratory: Breathing nonlabored; clear to auscultation  Cardiovascular: Regular rate and rhythm  Gastrointestinal: Soft, nontender, nondistended, normoactive bowel sounds; no hepatosplenomegaly appreciated; no rebound tenderness or involuntary guarding  Extremities: No clubbing, cyanosis or edema  Neurological: Alert and oriented to person, place and time; no asterixis  Skin: No jaundice  Lymph Nodes: No significant lymphadenopathy  Musculoskeletal: No significant peripheral atrophy  Psychiatric: Affect and mood appropriate    LABS:                        13.0   6.5   )-----------( 241      ( 26 Nov 2018 22:58 )             39.1     PT/INR - ( 26 Nov 2018 22:58 )   PT: 12.3 sec;   INR: 1.07 ratio         PTT - ( 26 Nov 2018 22:58 )  PTT:26.2 sec  11-26    139  |  104  |  14.0  ----------------------------<  92  3.8   |  22.0  |  0.35<L>    Ca    10.0      26 Nov 2018 22:58    IMAGING: I personally reviewed the portable CXR and do not appreciate any airspace disease or pulmonary congestion.  Radiologist's read is pending.

## 2018-11-27 NOTE — SWALLOW VFSS/MBS ASSESSMENT ADULT - DIAGNOSTIC IMPRESSIONS
Oral and pharyngeal stage of swallow judged to be WFL. Prior to po trials noted a raised soft tissue in the pharynx (C2-C3) After each trial of po pt continued to c/o of a globus sensation in the pharynx even though there was no visible po in the pharynx.

## 2018-11-27 NOTE — H&P ADULT - ASSESSMENT
72 y/o patient with PMHx of CAD with stents, CABG x3, HTN, COPD,  hypothyroidism, came to the ED complaining of chest pressure x 1 week. Patient said she had a 2 stents placed a week ago and she has been having chest pressure since then; now worsening. Chest pressure is on the mid chest, not exertional, non-radiating. She reported associated shortness of breath. Patient also reported difficulty swallowing x 1 day and choking sensation with solid food. Patient has no fever, diaphoresis, palpitation, abdominal pain, nausea/vomiting, change in bowel/ urinary habit, HA, no recent travel, leg swelling.     Chest pain r/o ACS   Admit to telemetry   Troponin x1 negative, will trend   Continue Aspirin 81mg   Plavix 75mg   Atorvastatin 40mg   Cardiology consulted in the ED (Dr. Ladd)    Dysphagia   Patient reported difficulty swallowing solid   GI consult     CAD s/p stents  Continue Aspirin 81mg   Plavix 75mg     Hypothyroidism  Continue Synthroid 125mcg     HTN  Continue Amlodipine 2.5mg     COPD   Duoneb PRN     Supportive  DVT prophylaxis: Lovenox 40mg   Diet: NPO for now

## 2018-11-27 NOTE — SWALLOW VFSS/MBS ASSESSMENT ADULT - RECOMMENDED FEEDING/EATING TECHNIQUES
oral hygiene/small sips/bites/provide rest periods between swallows/crush medication (when feasible)/position upright (90 degrees)

## 2018-11-27 NOTE — PROGRESS NOTE ADULT - ASSESSMENT
72 y/o female with PMHx of CAD with stents, CABG x3, HTN, COPD,  hypothyroidism, came to the ED complaining of chest pressure x 1 week - due to neck fullness    1. Neck fullness with SOB/chest pressure - ? soft tissue mass in pharynx based on MBS, ENT eval requested, diet changed based on SS eval recommendations, cont PPI    2. CAD s/p CABG x3 and PCIs x3 last 2 weeks ago - cont medical management    3. Hypothyroidism - synthroid    4. VTE prophylaxis LMWH

## 2018-11-28 ENCOUNTER — TRANSCRIPTION ENCOUNTER (OUTPATIENT)
Age: 73
End: 2018-11-28

## 2018-11-28 VITALS
RESPIRATION RATE: 17 BRPM | OXYGEN SATURATION: 96 % | SYSTOLIC BLOOD PRESSURE: 142 MMHG | DIASTOLIC BLOOD PRESSURE: 70 MMHG | HEART RATE: 75 BPM

## 2018-11-28 DIAGNOSIS — R13.10 DYSPHAGIA, UNSPECIFIED: ICD-10-CM

## 2018-11-28 LAB
ANION GAP SERPL CALC-SCNC: 11 MMOL/L — SIGNIFICANT CHANGE UP (ref 5–17)
BUN SERPL-MCNC: 13 MG/DL — SIGNIFICANT CHANGE UP (ref 8–20)
CALCIUM SERPL-MCNC: 9.4 MG/DL — SIGNIFICANT CHANGE UP (ref 8.6–10.2)
CHLORIDE SERPL-SCNC: 102 MMOL/L — SIGNIFICANT CHANGE UP (ref 98–107)
CO2 SERPL-SCNC: 23 MMOL/L — SIGNIFICANT CHANGE UP (ref 22–29)
CREAT SERPL-MCNC: 0.39 MG/DL — LOW (ref 0.5–1.3)
GLUCOSE SERPL-MCNC: 86 MG/DL — SIGNIFICANT CHANGE UP (ref 70–115)
POTASSIUM SERPL-MCNC: 3.9 MMOL/L — SIGNIFICANT CHANGE UP (ref 3.5–5.3)
POTASSIUM SERPL-SCNC: 3.9 MMOL/L — SIGNIFICANT CHANGE UP (ref 3.5–5.3)
SODIUM SERPL-SCNC: 136 MMOL/L — SIGNIFICANT CHANGE UP (ref 135–145)

## 2018-11-28 PROCEDURE — 93005 ELECTROCARDIOGRAM TRACING: CPT

## 2018-11-28 PROCEDURE — 84484 ASSAY OF TROPONIN QUANT: CPT

## 2018-11-28 PROCEDURE — 86850 RBC ANTIBODY SCREEN: CPT

## 2018-11-28 PROCEDURE — 85610 PROTHROMBIN TIME: CPT

## 2018-11-28 PROCEDURE — 71045 X-RAY EXAM CHEST 1 VIEW: CPT

## 2018-11-28 PROCEDURE — 74230 X-RAY XM SWLNG FUNCJ C+: CPT

## 2018-11-28 PROCEDURE — 85027 COMPLETE CBC AUTOMATED: CPT

## 2018-11-28 PROCEDURE — 36415 COLL VENOUS BLD VENIPUNCTURE: CPT

## 2018-11-28 PROCEDURE — 86901 BLOOD TYPING SEROLOGIC RH(D): CPT

## 2018-11-28 PROCEDURE — 86900 BLOOD TYPING SEROLOGIC ABO: CPT

## 2018-11-28 PROCEDURE — 70491 CT SOFT TISSUE NECK W/DYE: CPT

## 2018-11-28 PROCEDURE — 92611 MOTION FLUOROSCOPY/SWALLOW: CPT

## 2018-11-28 PROCEDURE — 99239 HOSP IP/OBS DSCHRG MGMT >30: CPT

## 2018-11-28 PROCEDURE — 83880 ASSAY OF NATRIURETIC PEPTIDE: CPT

## 2018-11-28 PROCEDURE — 92610 EVALUATE SWALLOWING FUNCTION: CPT

## 2018-11-28 PROCEDURE — 99233 SBSQ HOSP IP/OBS HIGH 50: CPT

## 2018-11-28 PROCEDURE — 80048 BASIC METABOLIC PNL TOTAL CA: CPT

## 2018-11-28 PROCEDURE — 99285 EMERGENCY DEPT VISIT HI MDM: CPT | Mod: 25

## 2018-11-28 PROCEDURE — 85730 THROMBOPLASTIN TIME PARTIAL: CPT

## 2018-11-28 RX ORDER — LEVOTHYROXINE SODIUM 125 MCG
1 TABLET ORAL
Qty: 0 | Refills: 0 | DISCHARGE
Start: 2018-11-28

## 2018-11-28 RX ORDER — LANSOPRAZOLE 15 MG/1
1 CAPSULE, DELAYED RELEASE ORAL
Qty: 0 | Refills: 2 | DISCHARGE
Start: 2018-11-28 | End: 2019-02-25

## 2018-11-28 RX ORDER — LANSOPRAZOLE 15 MG/1
1 CAPSULE, DELAYED RELEASE ORAL
Qty: 30 | Refills: 2 | OUTPATIENT
Start: 2018-11-28 | End: 2019-02-25

## 2018-11-28 RX ORDER — PANTOPRAZOLE SODIUM 20 MG/1
1 TABLET, DELAYED RELEASE ORAL
Qty: 30 | Refills: 2 | OUTPATIENT
Start: 2018-11-28 | End: 2019-02-25

## 2018-11-28 RX ORDER — LANSOPRAZOLE 15 MG/1
1 CAPSULE, DELAYED RELEASE ORAL
Qty: 0 | Refills: 0 | COMMUNITY

## 2018-11-28 RX ADMIN — CLOPIDOGREL BISULFATE 75 MILLIGRAM(S): 75 TABLET, FILM COATED ORAL at 11:08

## 2018-11-28 RX ADMIN — AMLODIPINE BESYLATE 2.5 MILLIGRAM(S): 2.5 TABLET ORAL at 05:42

## 2018-11-28 RX ADMIN — Medication 81 MILLIGRAM(S): at 11:08

## 2018-11-28 RX ADMIN — Medication 125 MICROGRAM(S): at 05:42

## 2018-11-28 NOTE — PROGRESS NOTE ADULT - ASSESSMENT
74 y/o female with PMHx of CAD with stents, CABG x3, HTN, COPD,  hypothyroidism, came to the ED complaining of chest pressure x 1 week - due to neck fullness    1. Neck fullness with SOB/chest pressure - ? soft tissue mass in pharynx based on MBS, ENT eval requested, diet changed based on SS eval recommendations, cont PPI   - ENT eval did not reveal any mass as well as CT neck with IV contrast - pt to continue pureed diet, resume prevacid (allergic to protonix), flonase and humidifier at home. F/u with GI within a week and if no improvement outpatient reevaluation with ENT may be needed    2. CAD s/p CABG x3 and PCIs x3 last 2 weeks ago - cont medical management, pt is high risk for any procedure under anesthesia, but if needed may proceed    3. Hypothyroidism - synthroid    4. VTE prophylaxis LMWH

## 2018-11-28 NOTE — PROGRESS NOTE ADULT - REASON FOR ADMISSION
Chest pressure and dysphagia

## 2018-11-28 NOTE — DISCHARGE NOTE ADULT - HOSPITAL COURSE
· Assessment		  72 y/o female with PMHx of CAD with stents, CABG x3, HTN, COPD,  hypothyroidism, came to the ED complaining of chest pressure x 1 week - due to neck fullness    1. Neck fullness with SOB/chest pressure - ? soft tissue mass in pharynx based on MBS, ENT eval requested, diet changed based on SS eval recommendations, cont PPI   - ENT eval did not reveal any mass as well as CT neck with IV contrast - pt to continue pureed diet, resume prevacid (allergic to protonix), flonase and humidifier at home. F/u with GI within a week and if no improvement outpatient reevaluation with ENT may be needed    2. CAD s/p CABG x3 and PCIs x3 last 2 weeks ago - cont medical management, pt is high risk for any procedure under anesthesia, but if needed may proceed    3. Hypothyroidism - synthroid    Medically stable for DC  Time spent 55 min

## 2018-11-28 NOTE — PROGRESS NOTE ADULT - PROBLEM SELECTOR PLAN 1
swallow test WNL. neck CT negative and ENT exam normal  - Pureed diet ( pt requested puree as opposed to regular)  - May D/C home from GI stanpoint. Please give rx of prevacid 30 mg  qd which she has taken in the past  - F/U with Dr andrew in 4 weeks

## 2018-11-28 NOTE — PROGRESS NOTE ADULT - SUBJECTIVE AND OBJECTIVE BOX
Patient is a 73y old  Female who presents with a chief complaint of Chest pressure and dysphagia (27 Nov 2018 18:33)      HPI:  74 y/o patient with PMHx of CAD with stents, CABG x3, HTN, COPD,  hypothyroidism, came to the ED complaining of chest pressure x 1 week. Patient said she had a 2 stents placed a week ago and she has been having chest pressure since then; now worsening.  + globus sensation. Had negative neck CT.  Speech felt there maybe phargeal lesion, but ENT exam was negative for lesion. MBS was WNL. Pt however wants a pureed diet which she is tolerating.  Refused protonix as protonix in past made her vomiting    REVIEW OF SYSTEMS:  Constitutional: No fever, weight loss or fatigue  ENMT:  No difficulty hearing, tinnitus, vertigo; No sinus or throat pain  Respiratory: No cough, wheezing, chills or hemoptysis  Cardiovascular: No chest pain, palpitations, dizziness or leg swelling  Gastrointestinal: No abdominal or epigastric pain. No nausea, vomiting or hematemesis; No diarrhea or constipation. No melena or hematochezia + globus sensation  Skin: No itching, burning, rashes or lesions   Musculoskeletal: No joint pain or swelling; No muscle, back or extremity pain    PAST MEDICAL & SURGICAL HISTORY:  PAD (peripheral artery disease)  MVP (mitral valve prolapse)  Aortic stenosis  Pancreatitis: 2011  Fibromyalgia  Hypothyroid  TIA (transient ischemic attack)  COPD (chronic obstructive pulmonary disease)  Hyperlipidemia  IBS (irritable bowel syndrome)  CAD (coronary artery disease)  Chest pain  H/O appendicitis  H/O cholecystitis  Coronary stent patent: 2016 RCA  S/P CABG x 3: 2015      FAMILY HISTORY:  Family history of heart disease (Father)      SOCIAL HISTORY:  Smoking Status: [ ] Current, [ ] Former, [ ] Never  Pack Years:  [  ] EtOH-no  [  ] IVDA-no    MEDICATIONS:  MEDICATIONS  (STANDING):  amLODIPine   Tablet 2.5 milliGRAM(s) Oral daily  aspirin  chewable 81 milliGRAM(s) Oral daily  atorvastatin 40 milliGRAM(s) Oral at bedtime  clopidogrel Tablet 75 milliGRAM(s) Oral daily  enoxaparin Injectable 40 milliGRAM(s) SubCutaneous daily  influenza   Vaccine 0.5 milliLiter(s) IntraMuscular once  levothyroxine 125 MICROGram(s) Oral daily  pantoprazole    Tablet 40 milliGRAM(s) Oral before breakfast    MEDICATIONS  (PRN):  ALBUTerol/ipratropium for Nebulization 3 milliLiter(s) Nebulizer every 6 hours PRN Shortness of Breath and/or Wheezing      Allergies    Bactrim (Rash)  chocolate (Other)  Cipro (Rash)  codeine (Rash)  sulfa drugs (Faint)    Intolerances        Vital Signs Last 24 Hrs  T(C): 36.8 (28 Nov 2018 05:39), Max: 36.8 (27 Nov 2018 11:15)  T(F): 98.2 (28 Nov 2018 05:39), Max: 98.2 (27 Nov 2018 11:15)  HR: 64 (28 Nov 2018 05:39) (61 - 64)  BP: 120/68 (28 Nov 2018 05:39) (120/68 - 149/68)  BP(mean): --  RR: 18 (28 Nov 2018 05:39) (17 - 18)  SpO2: 85% (28 Nov 2018 05:39) (85% - 97%)    11-27 @ 07:01  -  11-28 @ 07:00  --------------------------------------------------------  IN: 0 mL / OUT: 900 mL / NET: -900 mL          PHYSICAL EXAM:    General: Well developed; well nourished; in no acute distress  HEENT: MMM, conjunctiva and sclera clear  H- RRR  L- CTA  Gastrointestinal: Soft, non-tender non-distended; Normal bowel sounds; No rebound or guarding  Extremities: Normal range of motion, No clubbing, cyanosis or edema  Neurological: Alert and oriented x3  Skin: Warm and dry. No obvious rash      LABS:                        13.0   6.5   )-----------( 241      ( 26 Nov 2018 22:58 )             39.1     28 Nov 2018 06:13    136    |  102    |  13.0   ----------------------------<  86     3.9     |  23.0   |  0.39     Ca    9.4        28 Nov 2018 06:13                RADIOLOGY & ADDITIONAL STUDIES:
CC: Chest pressure and dysphagia (27 Nov 2018 06:55)    HPI: 72 y/o patient with PMHx of CAD with stents, CABG x3, HTN, COPD,  hypothyroidism, came to the ED complaining of chest pressure x 1 week. Patient said she had a 2 stents placed a week ago and she has been having chest pressure since then; now worsening. Chest pressure is on the mid chest, not exertional, non-radiating. She reported associated shortness of breath. Patient also reported difficulty swallowing x 1 day and choking sensation with solid food. Patient has no fever, diaphoresis, palpitation, abdominal pain, nausea/vomiting, change in bowel/ urinary habit, HA, no recent travel, leg swelling. (27 Nov 2018 04:01)    INTERVAL HPI/OVERNIGHT EVENTS: no CP, pt continue to c/o neck sensation associated with difficulty swallowing and breathing  Other ROS reviewed and neg     Vital Signs Last 24 Hrs  T(C): 36.8 (27 Nov 2018 11:15), Max: 37.1 (27 Nov 2018 08:01)  T(F): 98.2 (27 Nov 2018 11:15), Max: 98.7 (27 Nov 2018 08:01)  HR: 62 (27 Nov 2018 11:15) (62 - 72)  BP: 149/68 (27 Nov 2018 11:15) (148/72 - 182/85)  RR: 17 (27 Nov 2018 11:15) (17 - 18)  SpO2: 94% (27 Nov 2018 11:15) (94% - 97%)    CARDIAC MARKERS ( 27 Nov 2018 12:10 )  x     / <0.01 ng/mL / x     / x     / x      CARDIAC MARKERS ( 27 Nov 2018 04:16 )  x     / <0.01 ng/mL / x     / x     / x      CARDIAC MARKERS ( 26 Nov 2018 22:58 )  x     / 0.02 ng/mL / x     / x     / x                            13.0   6.5   )-----------( 241      ( 26 Nov 2018 22:58 )             39.1     26 Nov 2018 22:58    139    |  104    |  14.0   ----------------------------<  92     3.8     |  22.0   |  0.35     Ca    10.0       26 Nov 2018 22:58    PT/INR - ( 26 Nov 2018 22:58 )   PT: 12.3 sec;   INR: 1.07 ratio       PTT - ( 26 Nov 2018 22:58 )  PTT:26.2 sec    MEDICATIONS  (STANDING):  amLODIPine   Tablet 2.5 milliGRAM(s) Oral daily  aspirin  chewable 81 milliGRAM(s) Oral daily  atorvastatin 40 milliGRAM(s) Oral at bedtime  clopidogrel Tablet 75 milliGRAM(s) Oral daily  enoxaparin Injectable 40 milliGRAM(s) SubCutaneous daily  influenza   Vaccine 0.5 milliLiter(s) IntraMuscular once  levothyroxine 125 MICROGram(s) Oral daily  pantoprazole    Tablet 40 milliGRAM(s) Oral before breakfast    MEDICATIONS  (PRN):  ALBUTerol/ipratropium for Nebulization 3 milliLiter(s) Nebulizer every 6 hours PRN Shortness of Breath and/or Wheezing    RADIOLOGY & ADDITIONAL TESTS: personally visualized    PHYSICAL EXAM:    General: elderly female in no acute distress  Eyes: PERRLA, EOMI; conjunctiva and sclera clear  Head: Normocephalic; atraumatic  ENMT: No nasal discharge; airway clear  Neck: Supple; non tender; no masses  Respiratory: No wheezes, rales or rhonchi  Cardiovascular: Regular rate and rhythm. S1 and S2  Gastrointestinal: Soft non-tender non-distended; Normal bowel sounds  Genitourinary: No costovertebral angle tenderness  Extremities: No clubbing, cyanosis or edema  Vascular: Peripheral pulses palpable 2+ bilaterally  Neurological: Alert and oriented x3  Skin: Warm and dry.   Musculoskeletal: Normal tone, without deformities  Psychiatric: Cooperative and appropriate
CC: Chest pressure and dysphagia (27 Nov 2018 06:55)    HPI: 74 y/o patient with PMHx of CAD with stents, CABG x3, HTN, COPD,  hypothyroidism, came to the ED complaining of chest pressure x 1 week. Patient said she had a 2 stents placed a week ago and she has been having chest pressure since then; now worsening. Chest pressure is on the mid chest, not exertional, non-radiating. She reported associated shortness of breath. Patient also reported difficulty swallowing x 1 day and choking sensation with solid food. Patient has no fever, diaphoresis, palpitation, abdominal pain, nausea/vomiting, change in bowel/ urinary habit, HA, no recent travel, leg swelling. (27 Nov 2018 04:01)    INTERVAL HPI/OVERNIGHT EVENTS: no CP, pt continue to c/o neck sensation associated with difficulty swallowing and breathing  Other ROS reviewed and neg     Vital Signs Last 24 Hrs  T(C): 36.3 (28 Nov 2018 09:57), Max: 36.8 (27 Nov 2018 16:07)  T(F): 97.3 (28 Nov 2018 09:57), Max: 98.2 (27 Nov 2018 16:07)  HR: 70 (28 Nov 2018 09:57) (61 - 70)  BP: 120/55 (28 Nov 2018 09:57) (120/55 - 136/75)  RR: 18 (28 Nov 2018 09:57) (18 - 18)  SpO2: 98% (28 Nov 2018 09:57) (85% - 98%)    CARDIAC MARKERS ( 27 Nov 2018 12:10 )  x     / <0.01 ng/mL / x     / x     / x      CARDIAC MARKERS ( 27 Nov 2018 04:16 )  x     / <0.01 ng/mL / x     / x     / x      CARDIAC MARKERS ( 26 Nov 2018 22:58 )  x     / 0.02 ng/mL / x     / x     / x                           13.0   6.5   )-----------( 241      ( 26 Nov 2018 22:58 )             39.1     28 Nov 2018 06:13    136    |  102    |  13.0   ----------------------------<  86     3.9     |  23.0   |  0.39     Ca    9.4        28 Nov 2018 06:13    PT/INR - ( 26 Nov 2018 22:58 )   PT: 12.3 sec;   INR: 1.07 ratio      PTT - ( 26 Nov 2018 22:58 )  PTT:26.2 sec    MEDICATIONS  (STANDING):  amLODIPine   Tablet 2.5 milliGRAM(s) Oral daily  aspirin  chewable 81 milliGRAM(s) Oral daily  atorvastatin 40 milliGRAM(s) Oral at bedtime  clopidogrel Tablet 75 milliGRAM(s) Oral daily  enoxaparin Injectable 40 milliGRAM(s) SubCutaneous daily  influenza   Vaccine 0.5 milliLiter(s) IntraMuscular once  levothyroxine 125 MICROGram(s) Oral daily  pantoprazole    Tablet 40 milliGRAM(s) Oral before breakfast    MEDICATIONS  (PRN):  ALBUTerol/ipratropium for Nebulization 3 milliLiter(s) Nebulizer every 6 hours PRN Shortness of Breath and/or Wheezing    RADIOLOGY & ADDITIONAL TESTS: personally visualized    PHYSICAL EXAM:    General: elderly female in no acute distress  Eyes: PERRLA, EOMI; conjunctiva and sclera clear  Head: Normocephalic; atraumatic  ENMT: No nasal discharge; airway clear  Neck: Supple; non tender; no masses  Respiratory: No wheezes, rales or rhonchi  Cardiovascular: Regular rate and rhythm. S1 and S2  Gastrointestinal: Soft non-tender non-distended; Normal bowel sounds  Genitourinary: No costovertebral angle tenderness  Extremities: No clubbing, cyanosis or edema  Vascular: Peripheral pulses palpable 2+ bilaterally  Neurological: Alert and oriented x3  Skin: Warm and dry.   Musculoskeletal: Normal tone, without deformities  Psychiatric: Cooperative and appropriate
ENT consult - No pharyngeal, hypopharyngeal or laryngeal lesions or masses seen on my physical examination and on fiberoptic laryngoscopy. See dictated consult report.

## 2018-11-28 NOTE — DISCHARGE NOTE ADULT - PLAN OF CARE
resolution Pureed diet, resume prevacid, flonase and humidify air at home  F/u with GI continue current medications synthroid prevacid

## 2018-11-28 NOTE — DISCHARGE NOTE ADULT - PATIENT PORTAL LINK FT
You can access the [facility] Patient Portal, offered by [facility], by registering with the following website: [email or web address]

## 2018-11-28 NOTE — DISCHARGE NOTE ADULT - MEDICATION SUMMARY - MEDICATIONS TO STOP TAKING
I will STOP taking the medications listed below when I get home from the hospital:  None I will STOP taking the medications listed below when I get home from the hospital:    atorvastatin 40 mg oral tablet  -- 1 tab(s) by mouth once a day (at bedtime)

## 2018-11-28 NOTE — DISCHARGE NOTE ADULT - CARE PLAN
Principal Discharge DX:	Dysphagia, unspecified type  Goal:	resolution  Assessment and plan of treatment:	Pureed diet, resume prevacid, flonase and humidify air at home  F/u with GI  Secondary Diagnosis:	Coronary artery disease involving coronary bypass graft of native heart without angina pectoris  Assessment and plan of treatment:	continue current medications  Secondary Diagnosis:	Hypothyroidism, unspecified type  Assessment and plan of treatment:	synthroid  Secondary Diagnosis:	Gastroesophageal reflux disease, esophagitis presence not specified  Assessment and plan of treatment:	prevacid

## 2018-11-28 NOTE — DISCHARGE NOTE ADULT - MEDICATION SUMMARY - MEDICATIONS TO TAKE
I will START or STAY ON the medications listed below when I get home from the hospital:    aspirin 81 mg oral tablet  -- 1 tab(s) by mouth once a day  -- Indication: For CAD    nitroglycerin 0.4 mg sublingual spray  -- 1 spray(s) under tongue every 5 minutes  -- Indication: For CAD    atorvastatin 40 mg oral tablet  -- 1 tab(s) by mouth once a day (at bedtime)  -- Indication: For CAD    Plavix 75 mg oral tablet  -- 1 tab(s) by mouth once a day  -- Indication: For CAD    Norvasc 2.5 mg oral tablet  -- 1 tab(s) by mouth once a day  -- Indication: For HTN    Flonase 50 mcg/inh nasal spray  -- 1 spray(s) into nose once a day  -- Indication: For PND    Prevacid 30 mg oral delayed release capsule  -- 1 cap(s) by mouth once a day  -- Indication: For GERD    levothyroxine 125 mcg (0.125 mg) oral tablet  -- 1 tab(s) by mouth once a day  -- Indication: For Hypothyroidism I will START or STAY ON the medications listed below when I get home from the hospital:    aspirin 81 mg oral tablet  -- 1 tab(s) by mouth once a day  -- Indication: For CAD    nitroglycerin 0.4 mg sublingual spray  -- 1 spray(s) under tongue every 5 minutes  -- Indication: For CAD    atorvastatin 40 mg oral tablet  -- 1 tab(s) by mouth once a day (at bedtime)  -- Indication: For CAD    Plavix 75 mg oral tablet  -- 1 tab(s) by mouth once a day  -- Indication: For CAD    Norvasc 2.5 mg oral tablet  -- 1 tab(s) by mouth once a day  -- Indication: For HTN    Flonase 50 mcg/inh nasal spray  -- 1 spray(s) into nose once a day  -- Indication: For PND    pantoprazole 40 mg oral delayed release tablet  -- 1 tab(s) by mouth once a day  -- Indication: For Gerd    levothyroxine 125 mcg (0.125 mg) oral tablet  -- 1 tab(s) by mouth once a day  -- Indication: For Hypothyroidism I will START or STAY ON the medications listed below when I get home from the hospital:    aspirin 81 mg oral tablet  -- 1 tab(s) by mouth once a day  -- Indication: For CAD    nitroglycerin 0.4 mg sublingual spray  -- 1 spray(s) under tongue every 5 minutes  -- Indication: For CAD    atorvastatin 40 mg oral tablet  -- 1 tab(s) by mouth once a day (at bedtime)  -- Indication: For CAD    Plavix 75 mg oral tablet  -- 1 tab(s) by mouth once a day  -- Indication: For CAD    Norvasc 2.5 mg oral tablet  -- 1 tab(s) by mouth once a day  -- Indication: For HTN    Flonase 50 mcg/inh nasal spray  -- 1 spray(s) into nose once a day  -- Indication: For PND    Prevacid 30 mg oral delayed release capsule  -- 1 cap(s) by mouth once a day  -- Indication: For Gerd    levothyroxine 125 mcg (0.125 mg) oral tablet  -- 1 tab(s) by mouth once a day  -- Indication: For Hypothyroidism

## 2018-11-28 NOTE — DISCHARGE NOTE ADULT - SECONDARY DIAGNOSIS.
Coronary artery disease involving coronary bypass graft of native heart without angina pectoris Hypothyroidism, unspecified type Gastroesophageal reflux disease, esophagitis presence not specified

## 2018-11-28 NOTE — DISCHARGE NOTE ADULT - CARE PROVIDER_API CALL
NADINE Irizarry), Internal Medicine  [address] [place] [address]  Phone: [phone]  Fax: [phone]    Carmine Grubbs), Otolaryngology  [address] [place] [address]  Phone: [phone]  Fax: [phone]

## 2018-11-30 ENCOUNTER — APPOINTMENT (OUTPATIENT)
Dept: INTERVENTIONAL RADIOLOGY/VASCULAR | Facility: CLINIC | Age: 73
End: 2018-11-30

## 2018-12-04 ENCOUNTER — APPOINTMENT (OUTPATIENT)
Dept: GASTROENTEROLOGY | Facility: CLINIC | Age: 73
End: 2018-12-04

## 2018-12-19 ENCOUNTER — APPOINTMENT (OUTPATIENT)
Dept: GASTROENTEROLOGY | Facility: CLINIC | Age: 73
End: 2018-12-19
Payer: COMMERCIAL

## 2018-12-19 VITALS
WEIGHT: 127 LBS | BODY MASS INDEX: 22.5 KG/M2 | OXYGEN SATURATION: 99 % | HEART RATE: 63 BPM | DIASTOLIC BLOOD PRESSURE: 79 MMHG | SYSTOLIC BLOOD PRESSURE: 160 MMHG | HEIGHT: 63 IN | RESPIRATION RATE: 16 BRPM | TEMPERATURE: 99.1 F

## 2018-12-19 PROCEDURE — 99215 OFFICE O/P EST HI 40 MIN: CPT

## 2018-12-19 RX ORDER — LANSOPRAZOLE 15 MG/1
15 CAPSULE, DELAYED RELEASE ORAL
Refills: 0 | Status: DISCONTINUED | COMMUNITY
End: 2018-12-19

## 2019-01-07 ENCOUNTER — APPOINTMENT (OUTPATIENT)
Dept: OBGYN | Facility: CLINIC | Age: 74
End: 2019-01-07
Payer: COMMERCIAL

## 2019-01-07 ENCOUNTER — FORM ENCOUNTER (OUTPATIENT)
Age: 74
End: 2019-01-07

## 2019-01-07 VITALS
HEIGHT: 63 IN | WEIGHT: 128 LBS | SYSTOLIC BLOOD PRESSURE: 110 MMHG | DIASTOLIC BLOOD PRESSURE: 80 MMHG | BODY MASS INDEX: 22.68 KG/M2

## 2019-01-07 DIAGNOSIS — M81.0 AGE-RELATED OSTEOPOROSIS W/OUT CURRENT PATHOLOGICAL FRACTURE: ICD-10-CM

## 2019-01-07 PROCEDURE — 99397 PER PM REEVAL EST PAT 65+ YR: CPT

## 2019-01-07 RX ORDER — EZETIMIBE 10 MG/1
TABLET ORAL
Refills: 0 | Status: DISCONTINUED | COMMUNITY
End: 2019-01-07

## 2019-01-07 NOTE — PHYSICAL EXAM
[Normal] : uterus [No Bleeding] : there was no active vaginal bleeding [Uterine Adnexae] : were not tender and not enlarged

## 2019-01-07 NOTE — COUNSELING
[Breast Self Exam] : breast self exam [Nutrition] : nutrition [Exercise] : exercise [Weight Management] : weight management

## 2019-01-07 NOTE — HISTORY OF PRESENT ILLNESS
[Healthy Diet] : a healthy diet [Regular Exercise] : regular exercise [Last Mammogram ___] : Last Mammogram was [unfilled] [Last Bone Density ___] : Last bone density studies [unfilled] [Last Pap ___] : Last cervical pap smear was [unfilled] [Weight Concerns] : no concerns with her weight

## 2019-01-07 NOTE — CHIEF COMPLAINT
[Initial Visit] : initial GYN visit [FreeTextEntry1] : 72 yo  with LMP age 47 reports uterine polyp, but not having any bleeding despite being on Plavix and ASA 81 mg daily.  Uterine biopsy, Howard 2007 neg.

## 2019-01-08 ENCOUNTER — OUTPATIENT (OUTPATIENT)
Dept: OUTPATIENT SERVICES | Facility: HOSPITAL | Age: 74
LOS: 1 days | End: 2019-01-08
Payer: COMMERCIAL

## 2019-01-08 ENCOUNTER — OTHER (OUTPATIENT)
Age: 74
End: 2019-01-08

## 2019-01-08 DIAGNOSIS — Z95.1 PRESENCE OF AORTOCORONARY BYPASS GRAFT: Chronic | ICD-10-CM

## 2019-01-08 DIAGNOSIS — Z95.5 PRESENCE OF CORONARY ANGIOPLASTY IMPLANT AND GRAFT: Chronic | ICD-10-CM

## 2019-01-08 DIAGNOSIS — Z87.19 PERSONAL HISTORY OF OTHER DISEASES OF THE DIGESTIVE SYSTEM: Chronic | ICD-10-CM

## 2019-01-08 DIAGNOSIS — R13.10 DYSPHAGIA, UNSPECIFIED: ICD-10-CM

## 2019-01-08 PROCEDURE — 74220 X-RAY XM ESOPHAGUS 1CNTRST: CPT | Mod: 26

## 2019-01-08 PROCEDURE — 74220 X-RAY XM ESOPHAGUS 1CNTRST: CPT

## 2019-01-09 ENCOUNTER — OUTPATIENT (OUTPATIENT)
Dept: OUTPATIENT SERVICES | Facility: HOSPITAL | Age: 74
LOS: 1 days | End: 2019-01-09
Payer: COMMERCIAL

## 2019-01-09 VITALS
TEMPERATURE: 97 F | HEIGHT: 65 IN | RESPIRATION RATE: 18 BRPM | DIASTOLIC BLOOD PRESSURE: 57 MMHG | SYSTOLIC BLOOD PRESSURE: 153 MMHG | WEIGHT: 126.99 LBS | OXYGEN SATURATION: 97 % | HEART RATE: 64 BPM

## 2019-01-09 DIAGNOSIS — Z95.5 PRESENCE OF CORONARY ANGIOPLASTY IMPLANT AND GRAFT: Chronic | ICD-10-CM

## 2019-01-09 DIAGNOSIS — Z95.1 PRESENCE OF AORTOCORONARY BYPASS GRAFT: Chronic | ICD-10-CM

## 2019-01-09 DIAGNOSIS — Z87.19 PERSONAL HISTORY OF OTHER DISEASES OF THE DIGESTIVE SYSTEM: Chronic | ICD-10-CM

## 2019-01-09 DIAGNOSIS — K21.9 GASTRO-ESOPHAGEAL REFLUX DISEASE WITHOUT ESOPHAGITIS: ICD-10-CM

## 2019-01-09 DIAGNOSIS — I25.10 ATHEROSCLEROTIC HEART DISEASE OF NATIVE CORONARY ARTERY WITHOUT ANGINA PECTORIS: ICD-10-CM

## 2019-01-09 DIAGNOSIS — Z01.810 ENCOUNTER FOR PREPROCEDURAL CARDIOVASCULAR EXAMINATION: ICD-10-CM

## 2019-01-09 LAB
ALBUMIN SERPL ELPH-MCNC: 4.1 G/DL — SIGNIFICANT CHANGE UP (ref 3.3–5.2)
ALP SERPL-CCNC: 84 U/L — SIGNIFICANT CHANGE UP (ref 40–120)
ALT FLD-CCNC: 17 U/L — SIGNIFICANT CHANGE UP
ANION GAP SERPL CALC-SCNC: 11 MMOL/L — SIGNIFICANT CHANGE UP (ref 5–17)
APTT BLD: 31.8 SEC — SIGNIFICANT CHANGE UP (ref 27.5–36.3)
AST SERPL-CCNC: 21 U/L — SIGNIFICANT CHANGE UP
BASOPHILS # BLD AUTO: 0 K/UL — SIGNIFICANT CHANGE UP (ref 0–0.2)
BASOPHILS NFR BLD AUTO: 0.2 % — SIGNIFICANT CHANGE UP (ref 0–2)
BILIRUB SERPL-MCNC: 0.3 MG/DL — LOW (ref 0.4–2)
BUN SERPL-MCNC: 16 MG/DL — SIGNIFICANT CHANGE UP (ref 8–20)
CALCIUM SERPL-MCNC: 9.7 MG/DL — SIGNIFICANT CHANGE UP (ref 8.6–10.2)
CHLORIDE SERPL-SCNC: 104 MMOL/L — SIGNIFICANT CHANGE UP (ref 98–107)
CO2 SERPL-SCNC: 26 MMOL/L — SIGNIFICANT CHANGE UP (ref 22–29)
CREAT SERPL-MCNC: 0.55 MG/DL — SIGNIFICANT CHANGE UP (ref 0.5–1.3)
EOSINOPHIL # BLD AUTO: 0.1 K/UL — SIGNIFICANT CHANGE UP (ref 0–0.5)
EOSINOPHIL NFR BLD AUTO: 1.3 % — SIGNIFICANT CHANGE UP (ref 0–6)
GLUCOSE SERPL-MCNC: 76 MG/DL — SIGNIFICANT CHANGE UP (ref 70–115)
HCT VFR BLD CALC: 41 % — SIGNIFICANT CHANGE UP (ref 37–47)
HGB BLD-MCNC: 13.8 G/DL — SIGNIFICANT CHANGE UP (ref 12–16)
INR BLD: 1.09 RATIO — SIGNIFICANT CHANGE UP (ref 0.88–1.16)
LYMPHOCYTES # BLD AUTO: 1 K/UL — SIGNIFICANT CHANGE UP (ref 1–4.8)
LYMPHOCYTES # BLD AUTO: 22 % — SIGNIFICANT CHANGE UP (ref 20–55)
MCHC RBC-ENTMCNC: 31.2 PG — HIGH (ref 27–31)
MCHC RBC-ENTMCNC: 33.7 G/DL — SIGNIFICANT CHANGE UP (ref 32–36)
MCV RBC AUTO: 92.8 FL — SIGNIFICANT CHANGE UP (ref 81–99)
MONOCYTES # BLD AUTO: 0.4 K/UL — SIGNIFICANT CHANGE UP (ref 0–0.8)
MONOCYTES NFR BLD AUTO: 8.7 % — SIGNIFICANT CHANGE UP (ref 3–10)
NEUTROPHILS # BLD AUTO: 3.2 K/UL — SIGNIFICANT CHANGE UP (ref 1.8–8)
NEUTROPHILS NFR BLD AUTO: 67.8 % — SIGNIFICANT CHANGE UP (ref 37–73)
PLAT MORPH BLD: NORMAL — SIGNIFICANT CHANGE UP
PLATELET # BLD AUTO: 240 K/UL — SIGNIFICANT CHANGE UP (ref 150–400)
POTASSIUM SERPL-MCNC: 4.4 MMOL/L — SIGNIFICANT CHANGE UP (ref 3.5–5.3)
POTASSIUM SERPL-SCNC: 4.4 MMOL/L — SIGNIFICANT CHANGE UP (ref 3.5–5.3)
PROT SERPL-MCNC: 7.3 G/DL — SIGNIFICANT CHANGE UP (ref 6.6–8.7)
PROTHROM AB SERPL-ACNC: 12.6 SEC — SIGNIFICANT CHANGE UP (ref 10–12.9)
RBC # BLD: 4.42 M/UL — SIGNIFICANT CHANGE UP (ref 4.4–5.2)
RBC # FLD: 12.7 % — SIGNIFICANT CHANGE UP (ref 11–15.6)
RBC BLD AUTO: NORMAL — SIGNIFICANT CHANGE UP
SODIUM SERPL-SCNC: 141 MMOL/L — SIGNIFICANT CHANGE UP (ref 135–145)
WBC # BLD: 4.7 K/UL — LOW (ref 4.8–10.8)
WBC # FLD AUTO: 4.7 K/UL — LOW (ref 4.8–10.8)

## 2019-01-09 PROCEDURE — 85610 PROTHROMBIN TIME: CPT

## 2019-01-09 PROCEDURE — 80053 COMPREHEN METABOLIC PANEL: CPT

## 2019-01-09 PROCEDURE — 93010 ELECTROCARDIOGRAM REPORT: CPT

## 2019-01-09 PROCEDURE — 85027 COMPLETE CBC AUTOMATED: CPT

## 2019-01-09 PROCEDURE — G0463: CPT

## 2019-01-09 PROCEDURE — 85730 THROMBOPLASTIN TIME PARTIAL: CPT

## 2019-01-09 PROCEDURE — 93005 ELECTROCARDIOGRAM TRACING: CPT

## 2019-01-09 PROCEDURE — 36415 COLL VENOUS BLD VENIPUNCTURE: CPT

## 2019-01-09 NOTE — H&P PST ADULT - ASSESSMENT
Patient need endoscopy for dysphagia and needs clearance for procedure.  Patient has recently been complaining of chest pressure so here for PST for cardiac cath to r/o further CAD.

## 2019-01-09 NOTE — H&P PST ADULT - HISTORY OF PRESENT ILLNESS
74 yo female with history of CABG 12/24/ 2015, HTN, HLD, COPD and CAD with prior stents.  CABG LIMA to LAD, SVG to OM and SVG to posterior descending artery.  4/21/16 Distal RCA X 2 ABELARDO Xience, Alpine 2.5mm X 38 mm and 3.0 X 8mm.  5/4/2016 XIENCE, ALPINE proximal LAD 3.5 X 15 mm 72 yo female with history of CABG 12/24/ 2015, HTN, HLD, COPD and CAD with prior stents.  CABG LIMA to LAD, SVG to OM and SVG to posterior descending artery.  4/21/16 Distal RCA X 2 ABELARDO Xience, Alpine 2.5mm X 38 mm and 3.0 X 8mm.  5/4/2016 XIENCE, ALPINE proximal LAD 3.5 X 15 mm and proximal LCX  XIENCE, ALPINE 4.0 X 15 mm.  11/20/18 BOSTON SCIENTIFIC 2.8MM x 28MM to 85% D1 and [employer] 3.0 x 12MM TO 70% PROXIMAL LAD.    Patient need endoscopy for dysphagia and needs clearance for procedure.  Patient has recently been complaining of chest pressure so here for PST for cardiac cath to r/o further CAD.

## 2019-01-14 LAB — CYTOLOGY CVX/VAG DOC THIN PREP: NORMAL

## 2019-01-15 ENCOUNTER — OUTPATIENT (OUTPATIENT)
Dept: OUTPATIENT SERVICES | Facility: HOSPITAL | Age: 74
LOS: 1 days | End: 2019-01-15
Payer: COMMERCIAL

## 2019-01-15 ENCOUNTER — TRANSCRIPTION ENCOUNTER (OUTPATIENT)
Age: 74
End: 2019-01-15

## 2019-01-15 VITALS
HEART RATE: 72 BPM | DIASTOLIC BLOOD PRESSURE: 80 MMHG | OXYGEN SATURATION: 98 % | RESPIRATION RATE: 22 BRPM | SYSTOLIC BLOOD PRESSURE: 165 MMHG | TEMPERATURE: 98 F

## 2019-01-15 VITALS
SYSTOLIC BLOOD PRESSURE: 146 MMHG | OXYGEN SATURATION: 97 % | DIASTOLIC BLOOD PRESSURE: 67 MMHG | RESPIRATION RATE: 21 BRPM | HEART RATE: 66 BPM

## 2019-01-15 DIAGNOSIS — Z01.810 ENCOUNTER FOR PREPROCEDURAL CARDIOVASCULAR EXAMINATION: ICD-10-CM

## 2019-01-15 DIAGNOSIS — R07.9 CHEST PAIN, UNSPECIFIED: ICD-10-CM

## 2019-01-15 DIAGNOSIS — Z95.5 PRESENCE OF CORONARY ANGIOPLASTY IMPLANT AND GRAFT: Chronic | ICD-10-CM

## 2019-01-15 DIAGNOSIS — Z87.19 PERSONAL HISTORY OF OTHER DISEASES OF THE DIGESTIVE SYSTEM: Chronic | ICD-10-CM

## 2019-01-15 DIAGNOSIS — Z95.1 PRESENCE OF AORTOCORONARY BYPASS GRAFT: Chronic | ICD-10-CM

## 2019-01-15 PROCEDURE — C1887: CPT

## 2019-01-15 PROCEDURE — 93459 L HRT ART/GRFT ANGIO: CPT

## 2019-01-15 PROCEDURE — C1760: CPT

## 2019-01-15 PROCEDURE — C1894: CPT

## 2019-01-15 PROCEDURE — 99152 MOD SED SAME PHYS/QHP 5/>YRS: CPT

## 2019-01-15 PROCEDURE — C1769: CPT

## 2019-01-15 NOTE — DISCHARGE NOTE ADULT - MEDICATION SUMMARY - MEDICATIONS TO TAKE
I will START or STAY ON the medications listed below when I get home from the hospital:    aspirin 81 mg oral tablet  -- 1 tab(s) by mouth once a day  -- Indication: For antiplatelet    Tylenol 500 mg oral tablet  -- milliliter(s) by mouth every 6 hours  -- Indication: For pain    nitroglycerin 0.4 mg sublingual spray  -- 1 spray(s) under tongue every 5 minutes  -- Indication: For Chest pain    atorvastatin 40 mg oral tablet  -- 1 tab(s) by mouth once a day (at bedtime)  -- Indication: For high cholesterol    Plavix 75 mg oral tablet  -- 1 tab(s) by mouth once a day  -- Indication: For antipatelet    Norvasc 2.5 mg oral tablet  -- 1 tab(s) by mouth once a day  -- Indication: For high blood pressure    Flonase 50 mcg/inh nasal spray  -- 1 spray(s) into nose once a day  -- Indication: For seasonal allergies    Prevacid 30 mg oral delayed release capsule  -- 1 cap(s) by mouth 2 times a day  -- Indication: For GERD    levothyroxine 125 mcg (0.125 mg) oral tablet  -- 1 tab(s) by mouth once a day  -- Indication: For hypothyroid

## 2019-01-15 NOTE — DISCHARGE NOTE ADULT - CARE PROVIDER_API CALL
Micheal Ladd), Cardiovascular Disease; Interventional Cardiology  [address] [place] [address]  Phone: [phone]  Fax: [phone]

## 2019-01-15 NOTE — DISCHARGE NOTE ADULT - NS AS ACTIVITY OBS
Walking-Outdoors allowed/Stairs allowed/Do not drive or operate machinery/No Heavy lifting/straining/Showering allowed/Walking-Indoors allowed

## 2019-01-15 NOTE — DISCHARGE NOTE ADULT - CARE PLAN
Principal Discharge DX:	Chest pain  Goal:	Further evaluation for noncardiac reasons for chest pain  Assessment and plan of treatment:	No heavy lifting, driving, sex, tub baths, swimming, or any activity that submerges the lower half of the body in water for 48 hours.  Limited walking and stairs for 48 hours.    Change the bandaid after 24 hours and every 24 hours after that.  Keep the puncture site dry and covered with a bandaid until a scab forms.    Observe the site frequently.  If bleeding or a large lump (the size of a golf ball or bigger) occurs lie flat, apply continuous direct pressure just above the puncture site for at least 10 minutes, and notify your physician immediately.  If the bleeding cannot be controlled, call 911 immediately for assistance.  Notify your physician of pain, swelling or any drainage.    Notify your physician immediately if coldness, numbness, discoloration or pain in your foot occurs.  Follow up with Dr. Ladd in one to two weeks.

## 2019-01-15 NOTE — DISCHARGE NOTE ADULT - PLAN OF CARE
Further evaluation for noncardiac reasons for chest pain No heavy lifting, driving, sex, tub baths, swimming, or any activity that submerges the lower half of the body in water for 48 hours.  Limited walking and stairs for 48 hours.    Change the bandaid after 24 hours and every 24 hours after that.  Keep the puncture site dry and covered with a bandaid until a scab forms.    Observe the site frequently.  If bleeding or a large lump (the size of a golf ball or bigger) occurs lie flat, apply continuous direct pressure just above the puncture site for at least 10 minutes, and notify your physician immediately.  If the bleeding cannot be controlled, call 911 immediately for assistance.  Notify your physician of pain, swelling or any drainage.    Notify your physician immediately if coldness, numbness, discoloration or pain in your foot occurs.  Follow up with Dr. Ladd in one to two weeks.

## 2019-01-15 NOTE — DISCHARGE NOTE ADULT - PATIENT PORTAL LINK FT
You can access the [email or web address], offered by [facility], by registering with the following website: [email or web address]

## 2019-01-19 ENCOUNTER — EMERGENCY (EMERGENCY)
Facility: HOSPITAL | Age: 74
LOS: 1 days | End: 2019-01-19

## 2019-01-19 VITALS
DIASTOLIC BLOOD PRESSURE: 68 MMHG | HEIGHT: 65 IN | TEMPERATURE: 98 F | RESPIRATION RATE: 18 BRPM | OXYGEN SATURATION: 99 % | WEIGHT: 126.1 LBS | SYSTOLIC BLOOD PRESSURE: 172 MMHG | HEART RATE: 65 BPM

## 2019-01-19 DIAGNOSIS — Z95.1 PRESENCE OF AORTOCORONARY BYPASS GRAFT: Chronic | ICD-10-CM

## 2019-01-19 DIAGNOSIS — Z87.19 PERSONAL HISTORY OF OTHER DISEASES OF THE DIGESTIVE SYSTEM: Chronic | ICD-10-CM

## 2019-01-19 DIAGNOSIS — Z95.5 PRESENCE OF CORONARY ANGIOPLASTY IMPLANT AND GRAFT: Chronic | ICD-10-CM

## 2019-01-22 ENCOUNTER — APPOINTMENT (OUTPATIENT)
Dept: UROLOGY | Facility: CLINIC | Age: 74
End: 2019-01-22

## 2019-01-31 ENCOUNTER — APPOINTMENT (OUTPATIENT)
Dept: INTERNAL MEDICINE | Facility: CLINIC | Age: 74
End: 2019-01-31
Payer: COMMERCIAL

## 2019-01-31 VITALS
HEIGHT: 63 IN | WEIGHT: 124 LBS | DIASTOLIC BLOOD PRESSURE: 72 MMHG | TEMPERATURE: 98.1 F | HEART RATE: 62 BPM | OXYGEN SATURATION: 97 % | SYSTOLIC BLOOD PRESSURE: 124 MMHG | RESPIRATION RATE: 18 BRPM | BODY MASS INDEX: 21.97 KG/M2

## 2019-01-31 DIAGNOSIS — I25.10 ATHEROSCLEROTIC HEART DISEASE OF NATIVE CORONARY ARTERY W/OUT ANGINA PECTORIS: ICD-10-CM

## 2019-01-31 PROCEDURE — 94729 DIFFUSING CAPACITY: CPT

## 2019-01-31 PROCEDURE — G0444 DEPRESSION SCREEN ANNUAL: CPT | Mod: 59

## 2019-01-31 PROCEDURE — 99204 OFFICE O/P NEW MOD 45 MIN: CPT | Mod: 25

## 2019-01-31 PROCEDURE — 94727 GAS DIL/WSHOT DETER LNG VOL: CPT

## 2019-01-31 PROCEDURE — 94060 EVALUATION OF WHEEZING: CPT

## 2019-01-31 PROCEDURE — ZZZZZ: CPT

## 2019-01-31 NOTE — REVIEW OF SYSTEMS
[Fever] : no fever [Chills] : no chills [Diabetes] : no diabetes mellitus [Thyroid Problem] : thyroid problem

## 2019-01-31 NOTE — PROCEDURE
[FreeTextEntry1] : Full pulmonary function testing today shows normal spirometry and flow rates with an FEV1 of 2.123% predicted. Diffusing capacity is reduced.  O2 sat on room air is 97%.

## 2019-01-31 NOTE — ASSESSMENT
[FreeTextEntry1] : #1 patient with coronary artery disease, tells me recent cardiac catheterization with fine. She continues to follow closely with her cardiologist.\par \par #2 ex-cigarette smoker. Quit in 1989. Flow rates are normal. there is some decrease in diffusing capacity and she may have some early COPD. There is no need for inhaled medicines. Her last chest x-ray November 2018 showed clear lungs. I am asking her to have a repeat PA and lateral chest x-ray.\par \par #3 globus sensations.\par \par The patient is also being evaluated by GI and ENT. I've asked her to return for following pulmonary appointment if she has any increase in symptoms or clinical change. She will continue to follow closely with her cardiologist.\par \par \par \par

## 2019-01-31 NOTE — HISTORY OF PRESENT ILLNESS
[FreeTextEntry1] : This is a 73-year-old female with a history of coronary artery disease, peripheral vascular disease, previous CABG 2015 and subsequently 6 stents placed, most recently 2 stents in November 2018. She tells me she had a cardiac catheterization 2 days weeks and she was told that the results were fine.\par \par The patient is an ex-cigarette smoker who smoked a pack and a half per day for 25 years and quit in 1989. Not having any coughing, wheezing, sputum production, or hemoptysis. Since just after Thanksgiving she occasionally has a sensation of that she has to breathe more as well as globus sensations (that she has also had in the past). She is able to bicycle and walk on a treadmill for 30 minutes without any undue shortness of breath. She has no history of asthma. She is not on home O2 or CPAP.\par \par She is not having fever or chills. Her chest x-ray in November was within normal limits, findings of previous CABG.

## 2019-01-31 NOTE — PHYSICAL EXAM
[General Appearance - Well Developed] : well developed [Normal Appearance] : normal appearance [Well Groomed] : well groomed [General Appearance - Well Nourished] : well nourished [No Deformities] : no deformities [General Appearance - In No Acute Distress] : no acute distress [Normal Conjunctiva] : the conjunctiva exhibited no abnormalities [Eyelids - No Xanthelasma] : the eyelids demonstrated no xanthelasmas [Normal Oropharynx] : normal oropharynx [Neck Appearance] : the appearance of the neck was normal [Neck Cervical Mass (___cm)] : no neck mass was observed [Jugular Venous Distention Increased] : there was no jugular-venous distention [Thyroid Diffuse Enlargement] : the thyroid was not enlarged [Thyroid Nodule] : there were no palpable thyroid nodules [Heart Rate And Rhythm] : heart rate and rhythm were normal [Heart Sounds] : normal S1 and S2 [Respiration, Rhythm And Depth] : normal respiratory rhythm and effort [Exaggerated Use Of Accessory Muscles For Inspiration] : no accessory muscle use [Auscultation Breath Sounds / Voice Sounds] : lungs were clear to auscultation bilaterally [Abdomen Soft] : soft [Abdomen Tenderness] : non-tender [Abnormal Walk] : normal gait [Nail Clubbing] : no clubbing of the fingernails [Cyanosis, Localized] : no localized cyanosis [Skin Turgor] : normal skin turgor [] : no rash [FreeTextEntry1] : alert [Impaired Insight] : insight and judgment were intact

## 2019-01-31 NOTE — CONSULT LETTER
[Dear  ___] : Dear  [unfilled], [Consult Letter:] : I had the pleasure of evaluating your patient, [unfilled]. [Please see my note below.] : Please see my note below. [Consult Closing:] : Thank you very much for allowing me to participate in the care of this patient.  If you have any questions, please do not hesitate to contact me. [Sincerely,] : Sincerely, [FreeTextEntry2] : Dr James Sanchez Best [FreeTextEntry3] : Yuri Cameron

## 2019-02-12 ENCOUNTER — APPOINTMENT (OUTPATIENT)
Dept: NEUROSURGERY | Facility: CLINIC | Age: 74
End: 2019-02-12
Payer: COMMERCIAL

## 2019-02-26 ENCOUNTER — APPOINTMENT (OUTPATIENT)
Dept: NEUROSURGERY | Facility: CLINIC | Age: 74
End: 2019-02-26
Payer: COMMERCIAL

## 2019-02-26 VITALS
TEMPERATURE: 98.5 F | BODY MASS INDEX: 21.62 KG/M2 | DIASTOLIC BLOOD PRESSURE: 74 MMHG | RESPIRATION RATE: 16 BRPM | HEART RATE: 68 BPM | SYSTOLIC BLOOD PRESSURE: 143 MMHG | WEIGHT: 122 LBS | HEIGHT: 63 IN

## 2019-02-26 DIAGNOSIS — M25.511 PAIN IN RIGHT SHOULDER: ICD-10-CM

## 2019-02-26 DIAGNOSIS — M25.552 PAIN IN LEFT HIP: ICD-10-CM

## 2019-02-26 PROCEDURE — 99215 OFFICE O/P EST HI 40 MIN: CPT

## 2019-03-22 ENCOUNTER — APPOINTMENT (OUTPATIENT)
Dept: GASTROENTEROLOGY | Facility: GI CENTER | Age: 74
End: 2019-03-22

## 2019-04-04 NOTE — CONSULT LETTER
[Dear  ___] : Dear  [unfilled], [Sincerely,] : Sincerely, [Consult Letter:] : I had the pleasure of evaluating your patient, [unfilled]. [Consult Closing:] : Thank you very much for allowing me to participate in the care of this patient.  If you have any questions, please do not hesitate to contact me. [FreeTextEntry2] : James Munson MD\par [address] [place] [address]\[place] [address]  [FreeTextEntry1] : Ms. Hsu is a very pleasant 74-year-old female patient who was seen in our office in followup to review her images. The patient had previously been seen rigorous low back and bilateral leg pain. At today's visit, the patient complained of multiple other issues including neck pain, shoulder pain on the right, left hip pain, and bilateral numbness in her hands.\par \par The patient endorses a long-standing history of low back pain dating back 15 years. Unfortunately in August 31, 2018 the patient developed an acute exacerbation of her low back and bilateral leg pain. I have recommended that the patient obtain an updated MRI scan and a flexion/extension x-ray. This was reviewed in our office today.\par \par The patient's most recent imaging was performed on January 11, 2019 which included a lumbar spine MRI scan as well as a flexion/extension x-ray. The MRI scan demonstrates again the patient's L4/5 grade 1 spondylolisthesis with impingement of the nerve roots bilaterally. The patient's flexion/extension x-ray demonstrated stability of her spondylolisthesis with movement.\par \par On examination, patient is alert, oriented, and compliant with the exam. The patient demonstrates 5/5 strength in her lower extremities appropriate for age. She has 1+ reflexes bilaterally at the patellar tendons, and I am unable to elicit an Achilles reflex on either foot. The patient ambulates with an antalgic gait.\par \par I had a long discussion with the patient about her options at this time. Taken together, the patient has a clinical history and radiographic findings consistent with a bilateral lumbar radiculopathy secondary to L4/5 spondylolisthesis. Although the patient is been worsening clinically, the patient is also very high risk for surgery given her requirements to be on aspirin and Plavix. In addition to her multiple cardiac stents, the patient also has carotid stenosis bilaterally and an occluded left vertebral artery. I've counseled that the minimal surgery I perform and while still on aspirin and Plavix would be a minimally invasive approach to decompress the lumbar spine at L4/5 without fusion. However, I stressed that this would still be at high risk procedure for an epidural hematoma and possible need downstream paralysis. I explained to the patient that at this time, this procedure still remains a quality-of-life operation and thus I have recommended exhausting conservative therapies before even consideration of any operative procedure. At this time, I recommended pain management and lidocaine patches as needed. The patient is already seeing a massage therapist as well as a physical therapist. I further provided the patient bilateral splints for her presumed carpal tunnel syndrome, and x-rays of her neck, shoulder, and left hip. I requested followup in approximately 6 weeks' time to reevaluate her progress. [FreeTextEntry3] : \par Elliot Chao MD, PhD, FRCSC, FAANS\par \par Attending Neurosurgeon\par [facility]\par  of Neurosurgery\par [facility]\par \par [address]\[place] [address]\par \par Office: [phone]\par Fax: [phone]\par Email: [email or web address]  \par Elliot Chao MD, PhD, FRCSC, FAANS\par \par Attending Neurosurgeon\par [facility]\par  of Neurosurgery\par [facility]\par \par [address]\[place] [address]\par \par Office: [phone]\par Fax: [phone]\par Email: [email or web address]\par

## 2019-04-11 ENCOUNTER — APPOINTMENT (OUTPATIENT)
Age: 74
End: 2019-04-11
Payer: MEDICARE

## 2019-04-11 VITALS
RESPIRATION RATE: 16 BRPM | SYSTOLIC BLOOD PRESSURE: 119 MMHG | HEIGHT: 63 IN | TEMPERATURE: 97.7 F | BODY MASS INDEX: 21.26 KG/M2 | DIASTOLIC BLOOD PRESSURE: 71 MMHG | HEART RATE: 86 BPM | WEIGHT: 120 LBS

## 2019-04-11 DIAGNOSIS — M25.511 PAIN IN RIGHT SHOULDER: ICD-10-CM

## 2019-04-11 DIAGNOSIS — M25.512 PAIN IN RIGHT SHOULDER: ICD-10-CM

## 2019-04-11 DIAGNOSIS — M48.062 SPINAL STENOSIS, LUMBAR REGION WITH NEUROGENIC CLAUDICATION: ICD-10-CM

## 2019-04-11 PROCEDURE — 99214 OFFICE O/P EST MOD 30 MIN: CPT

## 2019-04-15 ENCOUNTER — APPOINTMENT (OUTPATIENT)
Dept: GASTROENTEROLOGY | Facility: CLINIC | Age: 74
End: 2019-04-15
Payer: MEDICARE

## 2019-04-15 VITALS
WEIGHT: 118 LBS | BODY MASS INDEX: 20.91 KG/M2 | RESPIRATION RATE: 15 BRPM | OXYGEN SATURATION: 98 % | DIASTOLIC BLOOD PRESSURE: 68 MMHG | HEART RATE: 62 BPM | SYSTOLIC BLOOD PRESSURE: 121 MMHG | HEIGHT: 63 IN

## 2019-04-15 PROCEDURE — 99214 OFFICE O/P EST MOD 30 MIN: CPT

## 2019-04-15 RX ORDER — AMLODIPINE BESYLATE 2.5 MG/1
2.5 TABLET ORAL DAILY
Refills: 0 | Status: ACTIVE | COMMUNITY

## 2019-04-15 NOTE — PHYSICAL EXAM
[General Appearance - Alert] : alert [Sclera] : the sclera and conjunctiva were normal [PERRL With Normal Accommodation] : pupils were equal in size, round, and reactive to light [General Appearance - In No Acute Distress] : in no acute distress [Outer Ear] : the ears and nose were normal in appearance [Oropharynx] : the oropharynx was normal [Extraocular Movements] : extraocular movements were intact [Neck Appearance] : the appearance of the neck was normal [Neck Cervical Mass (___cm)] : no neck mass was observed [Jugular Venous Distention Increased] : there was no jugular-venous distention [Thyroid Diffuse Enlargement] : the thyroid was not enlarged [Thyroid Nodule] : there were no palpable thyroid nodules [Auscultation Breath Sounds / Voice Sounds] : lungs were clear to auscultation bilaterally [Heart Rate And Rhythm] : heart rate was normal and rhythm regular [Heart Sounds Gallop] : no gallops [Heart Sounds] : normal S1 and S2 [Heart Sounds Pericardial Friction Rub] : no pericardial rub [Murmurs] : no murmurs [Bowel Sounds] : normal bowel sounds [Edema] : there was no peripheral edema [Abdomen Tenderness] : non-tender [Abdomen Soft] : soft [] : no hepato-splenomegaly [Abdomen Mass (___ Cm)] : no abdominal mass palpated [Cervical Lymph Nodes Enlarged Anterior Bilaterally] : anterior cervical [Cervical Lymph Nodes Enlarged Posterior Bilaterally] : posterior cervical [Nail Clubbing] : no clubbing  or cyanosis of the fingernails [Supraclavicular Lymph Nodes Enlarged Bilaterally] : supraclavicular [Skin Color & Pigmentation] : normal skin color and pigmentation [Skin Turgor] : normal skin turgor [No Focal Deficits] : no focal deficits [Oriented To Time, Place, And Person] : oriented to person, place, and time [Impaired Insight] : insight and judgment were intact [Affect] : the affect was normal

## 2019-04-15 NOTE — HISTORY OF PRESENT ILLNESS
[de-identified] : Patient returns with complaints of worsening reflux and worsening dysphagia.  Associated hoarseness.  Cannot tolerate ground meats any longer.  Does not feel that lansoprazole is helping.  Also concerned about a couple of lumps in her epigastrium.

## 2019-04-15 NOTE — ASSESSMENT
[FreeTextEntry1] : Worsening dysphagia and GERD symptoms.  Unclear etiology.  Esophagram negative.  EGD pending.  Sees JENSEN, had laryngoscope, showed worsening swallowing ability.  \par \par - Will plan on EGD\par - Change PPI to Dexilant\par - Send for CT chest to rule out occult malignancy as etiology for dysphagia\par - Referral to Dr. Elisa Barlow for esophageal manometry testing.

## 2019-04-18 PROBLEM — M48.062 LUMBAR STENOSIS WITH NEUROGENIC CLAUDICATION: Status: ACTIVE | Noted: 2018-11-06

## 2019-04-18 NOTE — CONSULT LETTER
[Dear  ___] : Dear  [unfilled], [Sincerely,] : Sincerely, [Consult Closing:] : Thank you very much for allowing me to participate in the care of this patient.  If you have any questions, please do not hesitate to contact me. [Consult Letter:] : I had the pleasure of evaluating your patient, [unfilled]. [FreeTextEntry2] : James Munson MD\par [address] [place] [address]\[place] [address]  [FreeTextEntry1] : Ms. Hsu is a very pleasant 74-year-old female patient seen in our office today in followup. The patient was here to discuss her most recent results.\par \par At today's visit, the patient's biggest concern is her right shoulder pain. She states that this pain has been considerably worsened since last time I saw her. The patient's right-sided shoulder pain worsens with certain motions and the patient is unable to abduct her shoulder beyond 90°. In addition, the patient also has low back pain radiating primarily into the left hip and her legs bilaterally. Finally the patient also has bilateral hand numbness which she states is slightly better after she started using wrist braces.\par \par On examination, the patient is alert, oriented, and compliant with the exam. The patient demonstrates 5/5 strength in her lower extremities bilaterally appropriate for age. Patient's right upper extremity examination demonstrates 5/5 strength, but the patient has considerable difficulty with movements of her right shoulder.\par \par The patient is accompanied with several images including an x-ray of the left hip performed on April 4, 2019 which returned normal. The patient's x-ray of her right shoulder perform the same day showed mild degenerative changes. The patient's cervical x-ray demonstrated multilevel degenerative changes. \par \par Taken together, I reiterated to the patient that her back and bilateral leg pain is most likely a result of her L4/5 stable spondylolisthesis with nerve root compression. However, given her extensive cardiac and vascular history I also reiterated that the risk of any surgical intervention no matter how small is quite high for a quality of life operation. From her shoulder perspective, I recommended seeing an orthopedic surgeon for a suspected a rotator cuff injury, and I prescribed physical therapy for symptomatic management of her neck and low back pain. Because of her significant vascular disease, I have reiterated to the patient that she should avoid massages in the neck area and certainly should avoid chiropractic manipulation. At this time, I have recommended followup in approximately 6 weeks' time to reevaluate her progress. [FreeTextEntry3] : \par Elliot Chao MD, PhD, FRCSC, FAANS\par \par Attending Neurosurgeon\par [facility] [place]\par  of Neurosurgery\par [facility]\par \par [address]\[place] [address]\par \par Office: [phone]\par Fax: [phone]\par Email: [email or web address]

## 2019-04-18 NOTE — REASON FOR VISIT
[Follow-Up: _____] : a [unfilled] follow-up visit [FreeTextEntry1] : Pt has Xray in [facility]

## 2019-04-22 ENCOUNTER — MEDICATION RENEWAL (OUTPATIENT)
Age: 74
End: 2019-04-22

## 2019-04-24 ENCOUNTER — APPOINTMENT (OUTPATIENT)
Dept: CT IMAGING | Facility: CLINIC | Age: 74
End: 2019-04-24

## 2019-05-01 ENCOUNTER — OUTPATIENT (OUTPATIENT)
Dept: OUTPATIENT SERVICES | Facility: HOSPITAL | Age: 74
LOS: 1 days | End: 2019-05-01
Payer: MEDICARE

## 2019-05-01 ENCOUNTER — APPOINTMENT (OUTPATIENT)
Dept: GASTROENTEROLOGY | Facility: HOSPITAL | Age: 74
End: 2019-05-01

## 2019-05-01 DIAGNOSIS — R13.14 DYSPHAGIA, PHARYNGOESOPHAGEAL PHASE: ICD-10-CM

## 2019-05-01 DIAGNOSIS — Z87.19 PERSONAL HISTORY OF OTHER DISEASES OF THE DIGESTIVE SYSTEM: Chronic | ICD-10-CM

## 2019-05-01 DIAGNOSIS — K29.70 GASTRITIS, UNSPECIFIED, W/OUT BLEEDING: ICD-10-CM

## 2019-05-01 DIAGNOSIS — F45.8 OTHER SOMATOFORM DISORDERS: ICD-10-CM

## 2019-05-01 DIAGNOSIS — K44.9 DIAPHRAGMATIC HERNIA W/OUT OBSTRUCTION OR GANGRENE: ICD-10-CM

## 2019-05-01 DIAGNOSIS — Z95.1 PRESENCE OF AORTOCORONARY BYPASS GRAFT: Chronic | ICD-10-CM

## 2019-05-01 DIAGNOSIS — Z95.5 PRESENCE OF CORONARY ANGIOPLASTY IMPLANT AND GRAFT: Chronic | ICD-10-CM

## 2019-05-01 PROCEDURE — 43235 EGD DIAGNOSTIC BRUSH WASH: CPT

## 2019-05-01 NOTE — PROCEDURE
[Procedure Explained] : The procedure was explained [Allergies Reviewed] : allergies reviewed. [Risks] : Risks [Benefits] : benefits [Alternatives] : alternatives [Consent Obtained] : written consent was obtained prior to the procedure and is detailed in the patient's record [Patient] : the patient [Automated Blood Pressure Cuff] : automated blood pressure cuff [Pulse Oximeter] : pulse oximeter [Cardiac Monitor] : cardiac monitor [Propofol ___ mg IV] : Propofol [unfilled] ~Umg intravenously [Sedation Clearance] : the patient was cleared for moderate sedation [3] : 3 [Hiatal Hernia] : hiatal hernia [Performed By: ___] : Performed by:  CY [Normal] : Normal [Erythema] : erythema [Tolerated Well] : the patient tolerated the procedure well [Vital Signs Stable] : the vital signs were stable [de-identified] : dysphagia

## 2019-05-01 NOTE — PHYSICAL EXAM
[General Appearance - Alert] : alert [General Appearance - In No Acute Distress] : in no acute distress [Sclera] : the sclera and conjunctiva were normal [PERRL With Normal Accommodation] : pupils were equal in size, round, and reactive to light [Extraocular Movements] : extraocular movements were intact [Outer Ear] : the ears and nose were normal in appearance [Oropharynx] : the oropharynx was normal [Neck Appearance] : the appearance of the neck was normal [Neck Cervical Mass (___cm)] : no neck mass was observed [Jugular Venous Distention Increased] : there was no jugular-venous distention [Thyroid Nodule] : there were no palpable thyroid nodules [Thyroid Diffuse Enlargement] : the thyroid was not enlarged [Auscultation Breath Sounds / Voice Sounds] : lungs were clear to auscultation bilaterally [Heart Rate And Rhythm] : heart rate was normal and rhythm regular [Heart Sounds] : normal S1 and S2 [Heart Sounds Gallop] : no gallops [Murmurs] : no murmurs [Heart Sounds Pericardial Friction Rub] : no pericardial rub [Edema] : there was no peripheral edema [Bowel Sounds] : normal bowel sounds [Abdomen Soft] : soft [] : no hepato-splenomegaly [Abdomen Tenderness] : non-tender [Cervical Lymph Nodes Enlarged Posterior Bilaterally] : posterior cervical [Abdomen Mass (___ Cm)] : no abdominal mass palpated [Nail Clubbing] : no clubbing  or cyanosis of the fingernails [Cervical Lymph Nodes Enlarged Anterior Bilaterally] : anterior cervical [Supraclavicular Lymph Nodes Enlarged Bilaterally] : supraclavicular [Skin Color & Pigmentation] : normal skin color and pigmentation [No Focal Deficits] : no focal deficits [Skin Turgor] : normal skin turgor [Oriented To Time, Place, And Person] : oriented to person, place, and time [Impaired Insight] : insight and judgment were intact [Affect] : the affect was normal

## 2019-05-01 NOTE — PHYSICAL EXAM
[General Appearance - Alert] : alert [General Appearance - In No Acute Distress] : in no acute distress [Sclera] : the sclera and conjunctiva were normal [PERRL With Normal Accommodation] : pupils were equal in size, round, and reactive to light [Extraocular Movements] : extraocular movements were intact [Outer Ear] : the ears and nose were normal in appearance [Oropharynx] : the oropharynx was normal [Neck Appearance] : the appearance of the neck was normal [Neck Cervical Mass (___cm)] : no neck mass was observed [Jugular Venous Distention Increased] : there was no jugular-venous distention [Thyroid Diffuse Enlargement] : the thyroid was not enlarged [Thyroid Nodule] : there were no palpable thyroid nodules [Auscultation Breath Sounds / Voice Sounds] : lungs were clear to auscultation bilaterally [Heart Rate And Rhythm] : heart rate was normal and rhythm regular [Heart Sounds] : normal S1 and S2 [Heart Sounds Gallop] : no gallops [Murmurs] : no murmurs [Heart Sounds Pericardial Friction Rub] : no pericardial rub [Edema] : there was no peripheral edema [Bowel Sounds] : normal bowel sounds [Abdomen Soft] : soft [] : no hepato-splenomegaly [Abdomen Tenderness] : non-tender [Abdomen Mass (___ Cm)] : no abdominal mass palpated [Cervical Lymph Nodes Enlarged Posterior Bilaterally] : posterior cervical [Cervical Lymph Nodes Enlarged Anterior Bilaterally] : anterior cervical [Nail Clubbing] : no clubbing  or cyanosis of the fingernails [Supraclavicular Lymph Nodes Enlarged Bilaterally] : supraclavicular [Skin Color & Pigmentation] : normal skin color and pigmentation [Skin Turgor] : normal skin turgor [No Focal Deficits] : no focal deficits [Oriented To Time, Place, And Person] : oriented to person, place, and time [Impaired Insight] : insight and judgment were intact [Affect] : the affect was normal

## 2019-05-01 NOTE — PROCEDURE
[Procedure Explained] : The procedure was explained [Risks] : Risks [Allergies Reviewed] : allergies reviewed. [Benefits] : benefits [Alternatives] : alternatives [Automated Blood Pressure Cuff] : automated blood pressure cuff [Patient] : the patient [Consent Obtained] : written consent was obtained prior to the procedure and is detailed in the patient's record [Pulse Oximeter] : pulse oximeter [Cardiac Monitor] : cardiac monitor [Propofol ___ mg IV] : Propofol [unfilled] ~Umg intravenously [3] : 3 [Sedation Clearance] : the patient was cleared for moderate sedation [Performed By: ___] : Performed by:  CY [Hiatal Hernia] : hiatal hernia [Normal] : Normal [Erythema] : erythema [Tolerated Well] : the patient tolerated the procedure well [Vital Signs Stable] : the vital signs were stable [de-identified] : dysphagia

## 2019-05-01 NOTE — ASSESSMENT
[FreeTextEntry1] : EGD performed to evaluate patient's worsening dysphagia.  Exam notable for a 2 cm hiatal hernia and mild striped erythematous gastropathy.  The remainder of the exam was grossly normal in appearance.  No etiology for the patient's dysphagia identified.\par \par Patient will follow up with GI motility specialist in one month.

## 2019-05-16 ENCOUNTER — APPOINTMENT (OUTPATIENT)
Dept: NEUROSURGERY | Facility: CLINIC | Age: 74
End: 2019-05-16
Payer: MEDICARE

## 2019-05-16 VITALS
TEMPERATURE: 98.7 F | RESPIRATION RATE: 16 BRPM | WEIGHT: 119 LBS | HEART RATE: 64 BPM | OXYGEN SATURATION: 98 % | DIASTOLIC BLOOD PRESSURE: 73 MMHG | BODY MASS INDEX: 21.09 KG/M2 | SYSTOLIC BLOOD PRESSURE: 145 MMHG | HEIGHT: 63 IN

## 2019-05-16 PROCEDURE — 99214 OFFICE O/P EST MOD 30 MIN: CPT

## 2019-05-17 NOTE — REASON FOR VISIT
[Follow-Up: _____] : a [unfilled] follow-up visit [Family Member] : family member [FreeTextEntry1] : Radha Hsu is a 74yr old female here to follow up after having carotid doppler ultrasound done. In November 2018 she had two more cardiac stents placed. She has also had persistent dysphagia of unknown cause since then as well. Her legs continue to bother her and she plans of having her PVD evaluation by Dr. Pappas. \par \par HPI: Radha Hsu follow up after undergoing recent imaging consisting of MRA NOVA done at [facility] 8//8/2018. She was being worked up for vertebral and basilar stenosis as well as carotid stenosis. She has no new symptoms related to this work up, but said she went to her cardiologist who is working her up for peripheral vascular disease. During that visit her ankle brachial index was elevated and he suggested revascularization. \par

## 2019-05-17 NOTE — ASSESSMENT
[FreeTextEntry1] : Impression: 73yr old female with carotid stenosis and vertebral artery stenosis\par \par Plan: \par Reviewed her follow up carotid doppler ultrasound which shows LICA PSV 95 and SHAMIKA \par No need for surgical intervention at this time\par MRI brain non con now to evaluate for a stroke as the cause of her dysphagia \par MRA brain non con with NOVA now to evaluate the posterior cerebral circulation blood flow\par Repeat Carotid Doppler Ultrasound next year \par

## 2019-05-17 NOTE — CONSULT LETTER
[Dear  ___] : Dear  [unfilled], [Consult Closing:] : Thank you very much for allowing me to participate in the care of this patient.  If you have any questions, please do not hesitate to contact me. [Consult Letter:] : I had the pleasure of evaluating your patient, [unfilled]. [Sincerely,] : Sincerely, [DrRomel ___] : Dr. BENOIT [DrRomel  ___] : Dr. BENOIT [FreeTextEntry2] : uKrt Munson [FreeTextEntry1] : As you know she is a 73 year old right handed female with a past medical history of psoriatic arthritis, COPD, hypertension, hyperlipidemia s/p CABG in 2015 and more recently drug eluting coronary stent placement.  She is currently on dual anti-platelet therapy and her PRU is 175.  She underwent an evaluation for dizziness and CT angiography demonstrates bilateral carotid stenosis and a right vertebral and basilar stenosis.  The left vertebral appears to terminate at the PICA. An ultrasound was consistent with at least moderate stenosis.  The option of further evaluation with angiography as well as treatment with carotid endarterectomy or angioplasty was discussed in great detail.  An quantitative MRA with NOVA actually demonstrates normal flow in the carotid arteries bilaterally and in the posterior circulation.  Given that the stenoses are asymptomatic I ordered an ultrasound in six months time.  I have also referred her to Dr. Clovis Pappas for her lower extremity disease. Today I reviewed her follow up carotid doppler ultrasound which demonstrates stable peak systolic flow values in the internal carotid arteries. At this time I recommend MRI of the brain and MRA of the brain with NOVA to look for a cause of her persistent dysphagia. \par  [FreeTextEntry3] : \par Sincerely,\par \par Daniel Link MD\par Professor of Neurological Surgery and Radiology\par  Department of Neurosurgery\par Director Cerebrovascular Surgery\par [facility] [employer] [facility]\par

## 2019-05-17 NOTE — PHYSICAL EXAM
[General Appearance - Alert] : alert [General Appearance - In No Acute Distress] : in no acute distress [Place] : oriented to place [Person] : oriented to person [Involuntary Movements] : no involuntary movements were seen [Time] : oriented to time [Motor Strength] : muscle strength was normal in all four extremities [] : no respiratory distress

## 2019-06-04 ENCOUNTER — APPOINTMENT (OUTPATIENT)
Dept: NEUROSURGERY | Facility: CLINIC | Age: 74
End: 2019-06-04

## 2019-06-06 ENCOUNTER — APPOINTMENT (OUTPATIENT)
Dept: GASTROENTEROLOGY | Facility: CLINIC | Age: 74
End: 2019-06-06
Payer: MEDICARE

## 2019-06-06 VITALS
SYSTOLIC BLOOD PRESSURE: 100 MMHG | WEIGHT: 120 LBS | HEART RATE: 68 BPM | OXYGEN SATURATION: 98 % | DIASTOLIC BLOOD PRESSURE: 62 MMHG | HEIGHT: 63 IN | BODY MASS INDEX: 21.26 KG/M2 | RESPIRATION RATE: 15 BRPM

## 2019-06-06 DIAGNOSIS — R13.14 DYSPHAGIA, PHARYNGOESOPHAGEAL PHASE: ICD-10-CM

## 2019-06-06 PROCEDURE — 99214 OFFICE O/P EST MOD 30 MIN: CPT

## 2019-06-06 PROCEDURE — 99204 OFFICE O/P NEW MOD 45 MIN: CPT

## 2019-06-06 NOTE — CONSULT LETTER
[Dear  ___] : Dear  [unfilled], [Please see my note below.] : Please see my note below. [Consult Letter:] : I had the pleasure of evaluating your patient, [unfilled]. [Consult Closing:] : Thank you very much for allowing me to participate in the care of this patient.  If you have any questions, please do not hesitate to contact me. [Sincerely,] : Sincerely, [FreeTextEntry2] : Dr. Miller Irizarry [FreeTextEntry3] : Elisa SALCEDORomel Barlow\par  of Medicine\par Director of Gastrointestinal Motility, [facility] \par Division of Gastroenterology\par [facility]/[address] [place] [address], [address]\[place] [address]\par Tel: [phone]\par Motility Appts-Motility Coordinator: Florence Mccarthy: [phone]\par

## 2019-06-06 NOTE — HISTORY OF PRESENT ILLNESS
[de-identified] : 74-year-old pleasant female history of CAD status post CABG/PCI on aspirin/Plavix here for initial consultation from her primary gastroenterologist for complaints of globus sensation, dysphagia, weight loss. Patient is complaining of a sensation of choking with the initiation of swallowing. Patient had a barium esophagram performed in January 2019, bolus was given orally, normal deglutition was observed, and esophagram was performed which shows no abnormalities noted.  Patient is also complaining of significant regurgitation, she denies any classic heartburn symptoms. Patient had upper endoscopy performed by Dr. Irizarry, they report no abnormalities.

## 2019-06-06 NOTE — ASSESSMENT
[FreeTextEntry1] : 74-year-old pleasant female here with complaint of pharyngeal choking sensation, difficulty breathing, globus sensation for the past year. Differential diagnosis includes cricopharyngeal bar, though that was not seen on barium swallow performed in January 2019, versus esophageal dysmotility disorder versus severe GERD. I recommend that she undergo esophageal motility with 24-hour pH impedance off of PPI to evaluate for LPR and any major motility disorder. I also scheduled her for an upper endoscopy with [facility] for further evaluation. We will not perform a bravo given that the patient is on aspirin Plavix and cannot be discontinued, and there is a slightly increased risk of bleeding with chip insertion. These recommendations were discussed with the patient and her daughter, and they're agreeable to proceed. The risks, benefits and alternatives of the procedure were discussed with the patient in detail. Risks include nasal irritation, bleeding, coughing, sore throat and sinusitis. All questions were answered. The patient expressed understanding of these risks and is agreeable to proceed. \par  The risks, benefits and alternatives of the procedure were discussed with the patient in detail. Risks include bleeding, infection, bowel perforation, adverse reaction to sedation and missed lesions. All questions were answered. The patient expressed understanding of these risks and is agreeable to proceed. \par

## 2019-06-07 ENCOUNTER — APPOINTMENT (OUTPATIENT)
Dept: INTERVENTIONAL RADIOLOGY/VASCULAR | Facility: CLINIC | Age: 74
End: 2019-06-07
Payer: MEDICARE

## 2019-06-07 VITALS
RESPIRATION RATE: 16 BRPM | SYSTOLIC BLOOD PRESSURE: 144 MMHG | DIASTOLIC BLOOD PRESSURE: 72 MMHG | HEART RATE: 84 BPM | OXYGEN SATURATION: 98 % | TEMPERATURE: 97.6 F

## 2019-06-07 DIAGNOSIS — H26.9 UNSPECIFIED CATARACT: ICD-10-CM

## 2019-06-07 DIAGNOSIS — Z60.2 PROBLEMS RELATED TO LIVING ALONE: ICD-10-CM

## 2019-06-07 DIAGNOSIS — Z87.11 PERSONAL HISTORY OF PEPTIC ULCER DISEASE: ICD-10-CM

## 2019-06-07 DIAGNOSIS — G45.9 TRANSIENT CEREBRAL ISCHEMIC ATTACK, UNSPECIFIED: ICD-10-CM

## 2019-06-07 DIAGNOSIS — H35.30 UNSPECIFIED MACULAR DEGENERATION: ICD-10-CM

## 2019-06-07 DIAGNOSIS — Z92.89 PERSONAL HISTORY OF OTHER MEDICAL TREATMENT: ICD-10-CM

## 2019-06-07 DIAGNOSIS — M81.0 AGE-RELATED OSTEOPOROSIS W/OUT CURRENT PATHOLOGICAL FRACTURE: ICD-10-CM

## 2019-06-07 DIAGNOSIS — Z78.9 OTHER SPECIFIED HEALTH STATUS: ICD-10-CM

## 2019-06-07 DIAGNOSIS — M51.26 OTHER INTERVERTEBRAL DISC DISPLACEMENT, LUMBAR REGION: ICD-10-CM

## 2019-06-07 DIAGNOSIS — Z87.891 PERSONAL HISTORY OF NICOTINE DEPENDENCE: ICD-10-CM

## 2019-06-07 PROCEDURE — 99205 OFFICE O/P NEW HI 60 MIN: CPT

## 2019-06-07 RX ORDER — CLOPIDOGREL BISULFATE 75 MG/1
75 TABLET, FILM COATED ORAL DAILY
Qty: 30 | Refills: 2 | Status: ACTIVE | COMMUNITY

## 2019-06-07 RX ORDER — ASPIRIN 81 MG
81 TABLET, DELAYED RELEASE (ENTERIC COATED) ORAL DAILY
Refills: 0 | Status: ACTIVE | COMMUNITY

## 2019-06-07 RX ORDER — THIAMINE MONONITRATE (VIT B1) 100 MG
100 TABLET ORAL DAILY
Refills: 0 | Status: ACTIVE | COMMUNITY
Start: 2019-06-07

## 2019-06-07 RX ORDER — DEXLANSOPRAZOLE 60 MG/1
60 CAPSULE, DELAYED RELEASE ORAL DAILY
Qty: 90 | Refills: 3 | Status: DISCONTINUED | COMMUNITY
Start: 2019-04-15 | End: 2019-06-07

## 2019-06-07 RX ORDER — MULTIVITAMIN
TABLET ORAL DAILY
Refills: 0 | Status: ACTIVE | COMMUNITY
Start: 2019-06-07

## 2019-06-07 RX ORDER — LIDOCAINE 5% 700 MG/1
5 PATCH TOPICAL
Qty: 30 | Refills: 0 | Status: DISCONTINUED | COMMUNITY
Start: 2019-02-26 | End: 2019-06-07

## 2019-06-07 SDOH — SOCIAL STABILITY - SOCIAL INSECURITY: PROBLEMS RELATED TO LIVING ALONE: Z60.2

## 2019-06-07 NOTE — PHYSICAL EXAM
[Alert] : alert [Well Nourished] : well nourished [Well Developed] : well developed [No Acute Distress] : no acute distress [Normal Voice/Communication] : normal voice communication [Healthy Appearance] : healthy appearance [Normal Rate and Effort] : normal respiratory rhythm and effort [Normal Sclera/Conjunctiva] : normal sclera/conjunctiva [No Respiratory Distress] : no respiratory distress [No Accessory Muscle Use] : no accessory muscle use [No Varicosities] : there were no varicosital changes [No Edema] : there was no peripheral edema [Normal Gait] : normal gait [No Skin Lesions] : no skin lesions [No Rash] : no rash [Normal Insight/Judgement] : insight and judgment were intact [Oriented x3] : oriented to person, place, and time [Normal Mood] : the mood was normal [Remote Memory Normal] : remote memory was not impaired [Normal Affect] : the affect was normal [Recent Memory Normal] : recent memory was not impaired [Fully active, able to carry on all pre-disease performance without restriction] : Fully active, able to carry on all pre-disease performance without restriction [Foot Ulcers] : no foot ulcers [de-identified] : right leg- +2 fem, pop, DP, and PT pulse, left leg +2 fem, +1 pop, +1 DP, and PT pulse, toes cool, acyanotic, +sensation, +movement, skin intact.

## 2019-06-07 NOTE — REVIEW OF SYSTEMS
[As Noted in HPI] : as noted in HPI [Itching] : itching [Negative] : Endocrine [Skin Lesions] : no skin lesions [Skin Wound] : no skin wound [Change In A Mole] : no change in a mole

## 2019-06-07 NOTE — HISTORY OF PRESENT ILLNESS
[FreeTextEntry1] : This is a 76 y/o female with pmhx of HTN, HLD, CAD, carotid stenosis, COPD, TIA, GERD, and hypothyroid who presents today for consultation for lower extremity angiogram.  Pt reported claudication symptoms that started over 1 year ago.  She reports aching/cramping pain in her posterior calf that would occur after walking 1 block.  Her symptoms would improve after stopping and rubbing her calves, but would reoccur after resumption of activity.  Also reported accompanying pruritus and burning of her calves and she noted the symptoms to worse on her right leg.  She was originally referred for intervention after GIBRAN studies in September were suggestive of PAD.  She had delay in follow up due to new onset dysphagia and coronary stent placement (2) in November.\par \par Since her new coronary stents in November, she has noted a significant improvement in her symptoms, where she now walks daily for 1 hour without any claudication symptoms.  She still continues to c/o intermittent mild aching, burning, and pruritus of the bilateral lower extremities, but denies discomfort with walking.  Reports walking during the night with symptoms, but walking/position change does not improve them.  Denies dry skin, non-healing ulcers/wounds, flaky skin, edema, or obvious varicosities.  She has been on daily Plavix and baby aspirin since her CABG in 2015.

## 2019-06-07 NOTE — CONSULT LETTER
[Referral Letter:] : I am referring [unfilled] to you for further evaluation.  My most recent evaluation follows. [Dear  ___] : Dear  [unfilled], [Referral Closing:] : Thank you very much for seeing this patient.  If you have any questions, please do not hesitate to contact me. [Please see my note below.] : Please see my note below. [Sincerely,] : Sincerely, [FreeTextEntry3] : Clovis Santoyo MD\par

## 2019-06-07 NOTE — ASSESSMENT
[Other: _____] : [unfilled] [FreeTextEntry1] : Ms. Hsu is a 76 y/o female with pmhx of HTN, HLD, CAD, carotid stenosis, COPD, TIA, GERD, and hypothyroid who presents today for consultation for lower extremity angiogram. Her Gibran/PVR from 9/2018 demonstrates bilaterally moderate to severe PAD, with 50-99% stenosis of the left proximal SFA and popiteal artery. \par \par I have reviewed her imaging and discussed the findings with Ms. Hsu and her daughter.  While her GIBRAN from September is suggestive of PAD, she has had a significant improvement in her claudication symptoms since having 2 new coronary stents placed in November.  She now walks 1 mile a day, without any symptoms of claudication.  She does however continue to c/o of intermittent pruritus and cramping, which may be a sign of chronic small vessel disease.  Based on her current clinical picture, she is not a candidate for intervention at this time.  I am recommending her to have repeat GIBRAN/PVR since it has been almost a year and she does have decreased pulses on her left leg.  I have given her a rx for the imaging and have recommended her to follow up with a vascular surgeon as well.  I have given her contact information for Dr. Avel Murphy.  I encouraged her to continue her daily exercise routine and antiplatelet regimen. I will be in touch with Ms. Hsu after I receive her test results. \par \par Her pulse exam is 2+ on RCFA, RPOPA, AND RDP; 2+ LCFA, 1+ LPOPA AND LDP\par \par I have provided the patient the opportunity to ask questions and have answered them to their satisfaction.  They are encouraged to contact our office with any further questions, concerns, or issues.\par \par

## 2019-06-07 NOTE — REASON FOR VISIT
[Consultation] : a consultation visit [Family Member] : family member [FreeTextEntry1] : peripheral angiogram

## 2019-06-19 ENCOUNTER — APPOINTMENT (OUTPATIENT)
Dept: GASTROENTEROLOGY | Facility: HOSPITAL | Age: 74
End: 2019-06-19
Payer: MEDICARE

## 2019-06-19 ENCOUNTER — OUTPATIENT (OUTPATIENT)
Dept: OUTPATIENT SERVICES | Facility: HOSPITAL | Age: 74
LOS: 1 days | Discharge: HOME | End: 2019-06-19
Payer: MEDICARE

## 2019-06-19 DIAGNOSIS — Z87.19 PERSONAL HISTORY OF OTHER DISEASES OF THE DIGESTIVE SYSTEM: Chronic | ICD-10-CM

## 2019-06-19 DIAGNOSIS — Z95.1 PRESENCE OF AORTOCORONARY BYPASS GRAFT: Chronic | ICD-10-CM

## 2019-06-19 DIAGNOSIS — Z95.5 PRESENCE OF CORONARY ANGIOPLASTY IMPLANT AND GRAFT: Chronic | ICD-10-CM

## 2019-06-19 DIAGNOSIS — R13.14 DYSPHAGIA, PHARYNGOESOPHAGEAL PHASE: ICD-10-CM

## 2019-06-19 PROCEDURE — 91038 ESOPH IMPED FUNCT TEST > 1HR: CPT | Mod: 26

## 2019-06-19 PROCEDURE — 91010 ESOPHAGUS MOTILITY STUDY: CPT | Mod: 26,GC,53

## 2019-06-19 PROCEDURE — 91037 ESOPH IMPED FUNCTION TEST: CPT | Mod: 26,GC,53

## 2019-06-28 ENCOUNTER — APPOINTMENT (OUTPATIENT)
Dept: GASTROENTEROLOGY | Facility: HOSPITAL | Age: 74
End: 2019-06-28

## 2019-06-28 ENCOUNTER — OUTPATIENT (OUTPATIENT)
Dept: OUTPATIENT SERVICES | Facility: HOSPITAL | Age: 74
LOS: 1 days | Discharge: HOME | End: 2019-06-28
Payer: MEDICARE

## 2019-06-28 DIAGNOSIS — Z95.1 PRESENCE OF AORTOCORONARY BYPASS GRAFT: Chronic | ICD-10-CM

## 2019-06-28 DIAGNOSIS — Z87.19 PERSONAL HISTORY OF OTHER DISEASES OF THE DIGESTIVE SYSTEM: Chronic | ICD-10-CM

## 2019-06-28 DIAGNOSIS — F45.8 OTHER SOMATOFORM DISORDERS: ICD-10-CM

## 2019-06-28 DIAGNOSIS — Z95.5 PRESENCE OF CORONARY ANGIOPLASTY IMPLANT AND GRAFT: Chronic | ICD-10-CM

## 2019-06-28 PROCEDURE — 91040 ESOPH BALLOON DISTENSION TST: CPT | Mod: 26,GC

## 2019-06-28 PROCEDURE — 43235 EGD DIAGNOSTIC BRUSH WASH: CPT | Mod: GC

## 2019-06-28 RX ORDER — SODIUM CHLORIDE 9 MG/ML
1000 INJECTION, SOLUTION INTRAVENOUS
Refills: 0 | Status: DISCONTINUED | OUTPATIENT
Start: 2019-06-28 | End: 2019-07-13

## 2019-06-28 RX ADMIN — SODIUM CHLORIDE 30 MILLILITER(S): 9 INJECTION, SOLUTION INTRAVENOUS at 11:49

## 2019-07-15 ENCOUNTER — TRANSCRIPTION ENCOUNTER (OUTPATIENT)
Age: 74
End: 2019-07-15

## 2019-07-18 ENCOUNTER — APPOINTMENT (OUTPATIENT)
Dept: GASTROENTEROLOGY | Facility: CLINIC | Age: 74
End: 2019-07-18
Payer: MEDICARE

## 2019-07-18 VITALS
BODY MASS INDEX: 18.83 KG/M2 | TEMPERATURE: 98.6 F | SYSTOLIC BLOOD PRESSURE: 120 MMHG | DIASTOLIC BLOOD PRESSURE: 80 MMHG | HEIGHT: 65 IN | OXYGEN SATURATION: 98 % | HEART RATE: 63 BPM | WEIGHT: 113 LBS

## 2019-07-18 PROCEDURE — 99214 OFFICE O/P EST MOD 30 MIN: CPT

## 2019-07-19 ENCOUNTER — OTHER (OUTPATIENT)
Age: 74
End: 2019-07-19

## 2019-09-10 ENCOUNTER — INPATIENT (INPATIENT)
Facility: HOSPITAL | Age: 74
LOS: 2 days | Discharge: HOME HEALTH | DRG: 61 | End: 2019-09-13
Attending: HOSPITALIST | Admitting: HOSPITALIST
Payer: MEDICARE

## 2019-09-10 ENCOUNTER — TRANSCRIPTION ENCOUNTER (OUTPATIENT)
Age: 74
End: 2019-09-10

## 2019-09-10 VITALS
OXYGEN SATURATION: 98 % | HEART RATE: 68 BPM | TEMPERATURE: 98 F | WEIGHT: 119.05 LBS | HEIGHT: 65 IN | RESPIRATION RATE: 18 BRPM | SYSTOLIC BLOOD PRESSURE: 186 MMHG | DIASTOLIC BLOOD PRESSURE: 71 MMHG

## 2019-09-10 DIAGNOSIS — Z95.5 PRESENCE OF CORONARY ANGIOPLASTY IMPLANT AND GRAFT: Chronic | ICD-10-CM

## 2019-09-10 DIAGNOSIS — Z87.19 PERSONAL HISTORY OF OTHER DISEASES OF THE DIGESTIVE SYSTEM: Chronic | ICD-10-CM

## 2019-09-10 DIAGNOSIS — Z95.1 PRESENCE OF AORTOCORONARY BYPASS GRAFT: Chronic | ICD-10-CM

## 2019-09-10 DIAGNOSIS — I63.9 CEREBRAL INFARCTION, UNSPECIFIED: ICD-10-CM

## 2019-09-10 LAB
ALBUMIN SERPL ELPH-MCNC: 4.3 G/DL — SIGNIFICANT CHANGE UP (ref 3.3–5.2)
ALP SERPL-CCNC: 106 U/L — SIGNIFICANT CHANGE UP (ref 40–120)
ALT FLD-CCNC: 20 U/L — SIGNIFICANT CHANGE UP
ANION GAP SERPL CALC-SCNC: 13 MMOL/L — SIGNIFICANT CHANGE UP (ref 5–17)
APTT BLD: 32 SEC — SIGNIFICANT CHANGE UP (ref 27.5–36.3)
AST SERPL-CCNC: 25 U/L — SIGNIFICANT CHANGE UP
BASOPHILS # BLD AUTO: 0.02 K/UL — SIGNIFICANT CHANGE UP (ref 0–0.2)
BASOPHILS NFR BLD AUTO: 0.4 % — SIGNIFICANT CHANGE UP (ref 0–2)
BILIRUB SERPL-MCNC: 0.2 MG/DL — LOW (ref 0.4–2)
BUN SERPL-MCNC: 13 MG/DL — SIGNIFICANT CHANGE UP (ref 8–20)
CALCIUM SERPL-MCNC: 10.1 MG/DL — SIGNIFICANT CHANGE UP (ref 8.6–10.2)
CHLORIDE SERPL-SCNC: 105 MMOL/L — SIGNIFICANT CHANGE UP (ref 98–107)
CO2 SERPL-SCNC: 24 MMOL/L — SIGNIFICANT CHANGE UP (ref 22–29)
CREAT SERPL-MCNC: 0.47 MG/DL — LOW (ref 0.5–1.3)
EOSINOPHIL # BLD AUTO: 0.08 K/UL — SIGNIFICANT CHANGE UP (ref 0–0.5)
EOSINOPHIL NFR BLD AUTO: 1.5 % — SIGNIFICANT CHANGE UP (ref 0–6)
GLUCOSE SERPL-MCNC: 127 MG/DL — HIGH (ref 70–115)
HCT VFR BLD CALC: 44.1 % — SIGNIFICANT CHANGE UP (ref 34.5–45)
HGB BLD-MCNC: 14.4 G/DL — SIGNIFICANT CHANGE UP (ref 11.5–15.5)
IMM GRANULOCYTES NFR BLD AUTO: 0.2 % — SIGNIFICANT CHANGE UP (ref 0–1.5)
INR BLD: 1.03 RATIO — SIGNIFICANT CHANGE UP (ref 0.88–1.16)
LYMPHOCYTES # BLD AUTO: 1.31 K/UL — SIGNIFICANT CHANGE UP (ref 1–3.3)
LYMPHOCYTES # BLD AUTO: 25.3 % — SIGNIFICANT CHANGE UP (ref 13–44)
MCHC RBC-ENTMCNC: 30.9 PG — SIGNIFICANT CHANGE UP (ref 27–34)
MCHC RBC-ENTMCNC: 32.7 GM/DL — SIGNIFICANT CHANGE UP (ref 32–36)
MCV RBC AUTO: 94.6 FL — SIGNIFICANT CHANGE UP (ref 80–100)
MONOCYTES # BLD AUTO: 0.37 K/UL — SIGNIFICANT CHANGE UP (ref 0–0.9)
MONOCYTES NFR BLD AUTO: 7.1 % — SIGNIFICANT CHANGE UP (ref 2–14)
NEUTROPHILS # BLD AUTO: 3.39 K/UL — SIGNIFICANT CHANGE UP (ref 1.8–7.4)
NEUTROPHILS NFR BLD AUTO: 65.5 % — SIGNIFICANT CHANGE UP (ref 43–77)
PLATELET # BLD AUTO: 246 K/UL — SIGNIFICANT CHANGE UP (ref 150–400)
POTASSIUM SERPL-MCNC: 3.7 MMOL/L — SIGNIFICANT CHANGE UP (ref 3.5–5.3)
POTASSIUM SERPL-SCNC: 3.7 MMOL/L — SIGNIFICANT CHANGE UP (ref 3.5–5.3)
PROT SERPL-MCNC: 8.3 G/DL — SIGNIFICANT CHANGE UP (ref 6.6–8.7)
PROTHROM AB SERPL-ACNC: 11.9 SEC — SIGNIFICANT CHANGE UP (ref 10–12.9)
RBC # BLD: 4.66 M/UL — SIGNIFICANT CHANGE UP (ref 3.8–5.2)
RBC # FLD: 12.7 % — SIGNIFICANT CHANGE UP (ref 10.3–14.5)
SODIUM SERPL-SCNC: 142 MMOL/L — SIGNIFICANT CHANGE UP (ref 135–145)
TROPONIN T SERPL-MCNC: <0.01 NG/ML — SIGNIFICANT CHANGE UP (ref 0–0.06)
WBC # BLD: 5.18 K/UL — SIGNIFICANT CHANGE UP (ref 3.8–10.5)
WBC # FLD AUTO: 5.18 K/UL — SIGNIFICANT CHANGE UP (ref 3.8–10.5)

## 2019-09-10 PROCEDURE — 70450 CT HEAD/BRAIN W/O DYE: CPT | Mod: 26,59

## 2019-09-10 PROCEDURE — 99223 1ST HOSP IP/OBS HIGH 75: CPT

## 2019-09-10 PROCEDURE — 93010 ELECTROCARDIOGRAM REPORT: CPT

## 2019-09-10 PROCEDURE — 70496 CT ANGIOGRAPHY HEAD: CPT | Mod: 26

## 2019-09-10 PROCEDURE — 99291 CRITICAL CARE FIRST HOUR: CPT

## 2019-09-10 PROCEDURE — G0426: CPT

## 2019-09-10 PROCEDURE — 70498 CT ANGIOGRAPHY NECK: CPT | Mod: 26

## 2019-09-10 RX ORDER — LABETALOL HCL 100 MG
10 TABLET ORAL ONCE
Refills: 0 | Status: COMPLETED | OUTPATIENT
Start: 2019-09-10 | End: 2019-09-10

## 2019-09-10 RX ORDER — ACETAMINOPHEN 500 MG
0 TABLET ORAL
Qty: 0 | Refills: 0 | DISCHARGE

## 2019-09-10 RX ORDER — AMLODIPINE BESYLATE 2.5 MG/1
2.5 TABLET ORAL DAILY
Refills: 0 | Status: DISCONTINUED | OUTPATIENT
Start: 2019-09-11 | End: 2019-09-11

## 2019-09-10 RX ORDER — NICARDIPINE HYDROCHLORIDE 30 MG/1
5 CAPSULE, EXTENDED RELEASE ORAL
Qty: 40 | Refills: 0 | Status: DISCONTINUED | OUTPATIENT
Start: 2019-09-10 | End: 2019-09-10

## 2019-09-10 RX ORDER — INFLUENZA VIRUS VACCINE 15; 15; 15; 15 UG/.5ML; UG/.5ML; UG/.5ML; UG/.5ML
0.5 SUSPENSION INTRAMUSCULAR ONCE
Refills: 0 | Status: COMPLETED | OUTPATIENT
Start: 2019-09-10 | End: 2019-09-10

## 2019-09-10 RX ORDER — ACETAMINOPHEN 500 MG
1000 TABLET ORAL ONCE
Refills: 0 | Status: COMPLETED | OUTPATIENT
Start: 2019-09-10 | End: 2019-09-10

## 2019-09-10 RX ORDER — LEVOTHYROXINE SODIUM 125 MCG
125 TABLET ORAL DAILY
Refills: 0 | Status: DISCONTINUED | OUTPATIENT
Start: 2019-09-10 | End: 2019-09-10

## 2019-09-10 RX ORDER — LEVOTHYROXINE SODIUM 125 MCG
125 TABLET ORAL DAILY
Refills: 0 | Status: DISCONTINUED | OUTPATIENT
Start: 2019-09-11 | End: 2019-09-11

## 2019-09-10 RX ORDER — ALTEPLASE 100 MG
5 KIT INTRAVENOUS ONCE
Refills: 0 | Status: COMPLETED | OUTPATIENT
Start: 2019-09-10 | End: 2019-09-10

## 2019-09-10 RX ORDER — ALTEPLASE 100 MG
45 KIT INTRAVENOUS ONCE
Refills: 0 | Status: COMPLETED | OUTPATIENT
Start: 2019-09-10 | End: 2019-09-10

## 2019-09-10 RX ADMIN — Medication 10 MILLIGRAM(S): at 12:28

## 2019-09-10 RX ADMIN — NICARDIPINE HYDROCHLORIDE 25 MG/HR: 30 CAPSULE, EXTENDED RELEASE ORAL at 13:00

## 2019-09-10 RX ADMIN — ALTEPLASE 45 MILLIGRAM(S): KIT at 12:38

## 2019-09-10 RX ADMIN — Medication 1000 MILLIGRAM(S): at 20:35

## 2019-09-10 RX ADMIN — Medication 400 MILLIGRAM(S): at 20:00

## 2019-09-10 RX ADMIN — ALTEPLASE 300 MILLIGRAM(S): KIT at 12:35

## 2019-09-10 NOTE — H&P ADULT - ASSESSMENT
75 y/o female with PMH of CABG, CAD s/p stents, HLD, Carotid stenosis, hypothyroidism, prior TIAs presents to [facility] this morning for numbness and tinging on her right side (face, hand, and lower leg). Started around 830-900 and progressively got worse, which prompted her to present to [facility]. On arrival to [facility], code stoke called, CT head was negative and she received tPA @ 12:35. CTA with no vascular abnormalities, however evidence of 60% carotid stenosis b/l. Will admit to MICU for further management and observation.     Problem List:  1) Ischemic CVA  2) CAD  3) HLD  4) hypothyroidism       - Will admit to MICU given tPA administration   - tPA given 12:35  - Obtain echo, PT/OT eval  -Check lipid panel and a1c  - Neurology consult placed  - Hold home dose ASA/plavix for now, until no evidence of bleed on repeat CT head  - Check CT head in 24 hours  - neurochecks q1   - WIll start on home dose statin lipitor 40mg will avoid high dose statin given her history of muscle aches with increased dose   - Carotid arteries with 60% stenosis. (known already and follows with Dr. Link at [place], will need to follow up with him upon discharge)  - Resume home dose synthroid, amlodipine

## 2019-09-10 NOTE — H&P ADULT - NSICDXPASTSURGICALHX_GEN_ALL_CORE_FT
PAST SURGICAL HISTORY:  Coronary stent patent 2016 RCA    H/O appendicitis     H/O cholecystitis     S/P CABG x 3 2015

## 2019-09-10 NOTE — DISCHARGE NOTE NURSING/CASE MANAGEMENT/SOCIAL WORK - PATIENT PORTAL LINK FT
You can access the FollowMyHealth Patient Portal offered by [facility] by registering at the following website: [email or web address]. By joining [employer]’s FollowMyHealth portal, you will also be able to view your health information using other applications (apps) compatible with our system.

## 2019-09-10 NOTE — ED ADULT NURSE NOTE - OBJECTIVE STATEMENT
pt comes to ED AOX3 ambulatory with reports of right arm numbness that began at 8:15 today. pt states she lied down, and at 9:15 she felt the right side of her face droop and begin to feel numb. pt presents with decreased sensation and ROM to right arm. pt with no slurred speech, pt with minimal right side facial droop noted. right leg heaviness noted on exam. pt reports hx of chronic dysphagia and hoarseness of voice. afebrile,  on arrival. code stroke activated by triage RN. pt taken directly to CT scan and ER MD brought to bedside immediately.

## 2019-09-10 NOTE — PHARMACOTHERAPY INTERVENTION NOTE - COMMENTS
Spoke to patient at bedside. Not on any anticoagulants. On ASA 81 and clopidogrel qday, last dose this morning.
Spoke to patient at bedside to complete comprehensive allergy review. See header for complete information
Stroke  tPA administered

## 2019-09-10 NOTE — H&P ADULT - RS GEN PE MLT RESP DETAILS PC
clear to auscultation bilaterally/no rhonchi/breath sounds equal/good air movement/no rales/respirations non-labored

## 2019-09-10 NOTE — ED PROVIDER NOTE - OBJECTIVE STATEMENT
Pt is a 73 yo F co R-sided numbness/weakness.  PMHx significant for copd, cad with stents/cabg, htn.  Pt states that today at 815 AM she had onset of R arm numbness.  Pt states that she tried to sleep it off but woke up and it was still there then felt like she was having heaviness to the arm and R facial droop.  no cp. no sob. no other complaints.  I was at bedside to evaluate this stroke patient at 1210 PM.

## 2019-09-10 NOTE — H&P ADULT - HISTORY OF PRESENT ILLNESS
75 y/o female with PMH of CABG, CAD s/p stents, HLD, Carotid stenosis, hypothyroidism, prior TIAs presents to [facility] this morning for numbness and tinging on her right side (face, hand, and lower leg). Started around 830-900 and progressively got worse, which prompted her to present to [facility]. On arrival to [facility], code stoke called, CT head was negative and she received tPA @ 12:35. CTA with no vascular abnormalities, however evidence of 60% carotid stenosis b/l. Patient seen and examined at the bedside. She is sitting up in bed. Complains of numbness in her hand, right sided face, and right lower leg. She states it seems to be getting a bit better post tPA administration.

## 2019-09-10 NOTE — ED ADULT NURSE REASSESSMENT NOTE - NS ED NURSE REASSESS COMMENT FT1
EICU at bedside via tele stroke. pt is awake and alert, NSR on cardiac monitor, Cardene gtt paused at this time due to adequate BP control. ER MD at bedside. TPA continues to infuse thru patent tubing on IV pump

## 2019-09-10 NOTE — ED PROVIDER NOTE - PHYSICAL EXAMINATION
Constitutional - well-developed; well nourished. Head - NCAT. Airway patent. Eyes - PERRL. CV - RRR. no murmur. no edema. Pulm - CTAB. Abd - soft, nt. no rebound. no guarding. Neuro - Alert and oriented to person, place and time.  diminished sensation to R arm, face and leg.  +R leg drift. minimal R facial droop. Skin - No rash. MSK - normal ROM.

## 2019-09-10 NOTE — STROKE CODE NOTE - SUBJECTIVE
74-year-old ? handed lady first evaluated by telestroke on 9/10/19 at [facility] with right-sided weakness and numbness. She has a history of "TIAs" in 1986. On 9/10/19, she was last known normal at around 8:15 AM, when she noted weakness and numbness of her right arm. She took a brief nap, and when she awoke, she had an additional right facial palsy. In the ED, she was noted to have a right facial palsy and a right leg drift as well as sensory disturbance, and was started on IV TPA. Medications at home included aspirin. ROS otherwise negative. Exam. NIHSS = 3. Alert and attentive; possibly a very mild right facial palsy; severe dysphonia (chronic); mild left (not right) leg drift; mildly decreased light touch sensation on right side; gait not tested; remainder of neurologic exam was nonfocal. CT head (9/10/19) to my eye showed a lacunar infarct involving the left caudate head, of indeterminate age.

## 2019-09-10 NOTE — ED PROVIDER NOTE - NS ED ROS FT
No fever/chills, No photophobia/eye pain/changes in vision, No ear pain/sore throat/dysphagia, No chest pain/palpitations, no SOB/cough/wheeze/stridor, No abdominal pain, No N/V/D, no dysuria/frequency/discharge, No neck/back pain, no rash,    (+)R arm numbness/R facial droop

## 2019-09-10 NOTE — ED ADULT TRIAGE NOTE - CHIEF COMPLAINT QUOTE
"I think I had TIA this morning I feel numbness and weakness in my arm and right leg, and facial droop" Pt states symptoms started at 8AM. Pt has hx of dysphasia

## 2019-09-10 NOTE — ED ADULT NURSE REASSESSMENT NOTE - NS ED NURSE REASSESS COMMENT FT1
pt taken to and from CT for angio by this RN. pt AOX3, no change in mental status, TPA completed as documented and pt with no signs of bleeding. even and unlabored resps present. symptoms subsiding with minimal right leg drift noted.

## 2019-09-10 NOTE — H&P ADULT - SENSORY
sensation intact, however tingling present in right hand, right sided face, and right lower leg/foot

## 2019-09-10 NOTE — ED PROVIDER NOTE - CLINICAL SUMMARY MEDICAL DECISION MAKING FREE TEXT BOX
labs and imaging reviewed. Pt's symptoms c/w cva. no contraindications to tPA for the 3-4.5 h time frame.  Pt given tPA. symptoms improving.  Case d/w Dr. Libman and Dr. Mancuso from telestroke who agreed with plan for tPA.  Dr. López to admit patient for further management.

## 2019-09-10 NOTE — H&P ADULT - NSICDXPASTMEDICALHX_GEN_ALL_CORE_FT
PAST MEDICAL HISTORY:  Aortic stenosis     CAD (coronary artery disease)     Chest pain     COPD (chronic obstructive pulmonary disease)     Fibromyalgia     Hyperlipidemia     Hypothyroid     IBS (irritable bowel syndrome)     MVP (mitral valve prolapse)     PAD (peripheral artery disease)     Pancreatitis 2011    TIA (transient ischemic attack)

## 2019-09-10 NOTE — CONSULT NOTE ADULT - SUBJECTIVE AND OBJECTIVE BOX
[facility]                                        [facility] [place]                                  Bob Millard, & Enio                                      [address]                                           [place], [place], [address]                                                [phone]        CC: Stroke    HISTORY:  The patient is a 74y Female who presented with right sided numbness and weakness which began the morning of arrival around 8:15 am. She arrived around noon and Code Stroke protocol was activated and the patient was given IV t-PA.   I was called after the infusion was completed.   She now reports residual symptoms on the right side.     PAST MEDICAL & SURGICAL HISTORY:  PAD (peripheral artery disease)  MVP (mitral valve prolapse)  Aortic stenosis  Pancreatitis:   Fibromyalgia  Hypothyroid  TIA (transient ischemic attack)  COPD (chronic obstructive pulmonary disease)  Hyperlipidemia  IBS (irritable bowel syndrome)  CAD (coronary artery disease)  Chest pain  H/O appendicitis  H/O cholecystitis  Coronary stent patent:  RCA  S/P CABG x 3: 2015      MEDICATION PRIOR TO ADMISSION:  Amlodipine, Aspirin, Plavix, Levothyroxine, Fluticasone, Atorvastatin,     MEDICATIONS  (STANDING):  niCARdipine Infusion 5 mG/Hr (25 mL/Hr) IV Continuous <Continuous>    Allergies  Bactrim (Faint)  chocolate (Other)  Cipro (Rash)  Flagyl (Faint)  Macrobid (Faint)  sulfa drugs (Faint)    Intolerances  codeine (Nausea)  pantoprazole (Vomiting)    SOCIAL HISTORY:  Former smoker.     FAMILY HISTORY:  Father : Heart disease/MI. No known history of stroke.   Mother . No known history of stroke.   Brother . No known history of stroke.   Daughter alive and well.     ROS:  Constitutional: The patient denies fevers or weight changes.  Neuro: As per HPI.  Eyes: Denies blurry vision.  Ears/nose/throat: Denies Tinnitus.   Cardiac: Denies chest pain. Denies palpitations.  Respiratory: Denies shortness of breath.  GI: Denies abdominal pain, nausea, or vomiting.  : Denies change in urinary pattern.  Integumentary: Denies rash.  Psych: Denies recent mood changes.  Heme: denies easy bleeding/bruising.    Exam:  Vital Signs Last 24 Hrs  T(C): 36.2 (10 Sep 2019 15:00), Max: 36.7 (10 Sep 2019 12:04)  T(F): 97.2 (10 Sep 2019 15:00), Max: 98.1 (10 Sep 2019 12:04)  HR: 64 (10 Sep 2019 15:00) (60 - 82)  BP: 144/72 (10 Sep 2019 15:00) (140/68 - 217/97)  RR: 16 (10 Sep 2019 15:00) (16 - 18)  SpO2: 97% (10 Sep 2019 15:) (95% - 99%)  General: NAD.   Carotid bruits absent.     Mental status: The patient is awake, alert, and fully oriented. There is no aphasia.    Cranial nerves: There is no papilledema. Pupils react symmetrically to light. There is no visual field deficit to confrontation. Extraocular motion is full with no nystagmus. There is no ptosis. Facial sensation is intact. Facial musculature is asymmetric with a depression of the right nasolabial fold. Palate elevates symmetrically. Tongue is midline.    Motor: There is normal bulk and tone.  Strength is 5/5 in the right arm and leg. There is decreased fine finger movement on the right.  Strength is 5/5 in the left arm and leg.    Sensation: Slight dysesthetic quality to light touch and pin on the right.  No extinction to double simultaneous stimulation.     Reflexes: 2+ throughout and plantar responses are flexor.    Cerebellar: There is no dysmetria on finger to nose testing.    LABS:                         14.4   5.18  )-----------( 246      ( 10 Sep 2019 12:51 )             44.1       09-10    142  |  105  |  13.0  ----------------------------<  127<H>  3.7   |  24.0  |  0.47<L>    Ca    10.1      10 Sep 2019 12:51    TPro  8.3  /  Alb  4.3  /  TBili  0.2<L>  /  DBili  x   /  AST  25  /  ALT  20  /  AlkPhos  106  09-10      PT/INR - ( 10 Sep 2019 12:51 )   PT: 11.9 sec;   INR: 1.03 ratio    PTT - ( 10 Sep 2019 12:51 )  PTT:32.0 sec    RADIOLOGY   CT head images reviewed (and concur with report): There is no acute pathology. There is what appears to be a left caudate infarct which is of indeterminate age.   CT-A of the neck shows ~60% stenosis of the bilateral ICAs in the cervical region.   CT-A of the brain shows no sign of large vessel occlusion. [facility]                                        Ne[facility] at [place]                                  Bob Millard, & Enio                                      [address]                                           [place], [place], [address]                                                [phone]        CC: Stroke    HISTORY:  The patient is a 74y Female who presented with right sided numbness and weakness which began the morning of arrival around 8:15 am. She arrived around noon and Code Stroke protocol was activated and the patient was given IV t-PA.   I was called after the infusion was completed.   She now reports residual symptoms on the right side.     PAST MEDICAL & SURGICAL HISTORY:  PAD (peripheral artery disease)  MVP (mitral valve prolapse)  Aortic stenosis  Pancreatitis:   Fibromyalgia  Hypothyroid  TIA (transient ischemic attack)  COPD (chronic obstructive pulmonary disease)  Hyperlipidemia  IBS (irritable bowel syndrome)  CAD (coronary artery disease)  Chest pain  H/O appendicitis  H/O cholecystitis  Coronary stent patent:  RCA  S/P CABG x 3: 2015      MEDICATION PRIOR TO ADMISSION:  Amlodipine, Aspirin, Plavix, Levothyroxine, Fluticasone, Atorvastatin,     MEDICATIONS  (STANDING):  niCARdipine Infusion 5 mG/Hr (25 mL/Hr) IV Continuous <Continuous>    Allergies  Bactrim (Faint)  chocolate (Other)  Cipro (Rash)  Flagyl (Faint)  Macrobid (Faint)  sulfa drugs (Faint)    Intolerances  codeine (Nausea)  pantoprazole (Vomiting)    SOCIAL HISTORY:  Former smoker.     FAMILY HISTORY:  Father : Heart disease/MI. No known history of stroke.   Mother . No known history of stroke.   Brother . No known history of stroke.   Daughter alive and well.     ROS:  Constitutional: The patient denies fevers or weight changes.  Neuro: As per HPI.  Eyes: Denies blurry vision.  Ears/nose/throat: Denies Tinnitus.   Cardiac: Denies chest pain. Denies palpitations.  Respiratory: Denies shortness of breath.  GI: Denies abdominal pain, nausea, or vomiting.  : Denies change in urinary pattern.  Integumentary: Denies rash.  Psych: Denies recent mood changes.  Heme: denies easy bleeding/bruising.    Exam:  Vital Signs Last 24 Hrs  T(C): 36.2 (10 Sep 2019 15:00), Max: 36.7 (10 Sep 2019 12:04)  T(F): 97.2 (10 Sep 2019 15:00), Max: 98.1 (10 Sep 2019 12:04)  HR: 64 (10 Sep 2019 15:00) (60 - 82)  BP: 144/72 (10 Sep 2019 15:00) (140/68 - 217/97)  RR: 16 (10 Sep 2019 15:00) (16 - 18)  SpO2: 97% (10 Sep 2019 15:00) (95% - 99%)  General: NAD.   Carotid bruits absent.     Mental status: The patient is awake, alert, and fully oriented. There is no aphasia.    Cranial nerves: There is no papilledema. Pupils react symmetrically to light. There is no visual field deficit to confrontation. Extraocular motion is full with no nystagmus. There is no ptosis. Facial sensation is intact. Facial musculature is asymmetric with a depression of the right nasolabial fold. Palate elevates symmetrically. Tongue is midline.    Motor: There is normal bulk and tone.  Strength is 5/5 in the right arm and leg. There is decreased fine finger movement on the right.  Strength is 5/5 in the left arm and leg.    Sensation: Slight dysesthetic quality to light touch and pin on the right.  No extinction to double simultaneous stimulation.     Reflexes: 2+ throughout and plantar responses are flexor.    Cerebellar: There is no dysmetria on finger to nose testing.    RUST SS:   Date: 9/10/19  Time: 3:30 pm  1a) Level of consciousness (0-3): 0  1b) Questions (0-2): 0  1c) Commands (0-2): 0  2  ) Gaze (0-2): 0  3  ) Visual field (0-3): 0  4  ) Facial palsy (0-3): 1  Motor  5a) Left arm (0-4): 0  5b) Right arm (0-4): 0  6a) Left leg (0-4): 0  6b) Right leg (0-4): 0  7  ) Ataxia (0-2): 0  Sensory  8  ) Sensory (0-2): 1  Speech  9  ) Language (0-3): 0  10) Dysarthria (0-2): 0  Extinction  11) Extinction/inattention (0-2): 0    Total score: 2    LABS:                         14.4   5.18  )-----------( 246      ( 10 Sep 2019 12:51 )             44.1       09-10    142  |  105  |  13.0  ----------------------------<  127<H>  3.7   |  24.0  |  0.47<L>    Ca    10.1      10 Sep 2019 12:51    TPro  8.3  /  Alb  4.3  /  TBili  0.2<L>  /  DBili  x   /  AST  25  /  ALT  20  /  AlkPhos  106  09-10      PT/INR - ( 10 Sep 2019 12:51 )   PT: 11.9 sec;   INR: 1.03 ratio    PTT - ( 10 Sep 2019 12:51 )  PTT:32.0 sec    RADIOLOGY   CT head images reviewed (and concur with report): There is no acute pathology. There is what appears to be a left caudate infarct which is of indeterminate age.   CT-A of the neck shows ~60% stenosis of the bilateral ICAs in the cervical region.   CT-A of the brain shows no sign of large vessel occlusion.

## 2019-09-10 NOTE — STROKE CODE NOTE - OBJECTIVE
Impression. On 9/10/19 she developed right-sided weakness and numbness, a sensory-motor syndrome. Her presentation is consistent with left brain dysfunction, perhaps in the thalamocapsular region or less likely the kathryn-rolandic cortical region. Etiology may be a small ischemic stroke of uncertain mechanism, but perhaps small vessel disease. She was started on IV TPA prior to the telestroke consultation and her deficits are already improving. Given her mild deficits, she is most likely not a candidate for endovascular thrombectomy, but would screen for large artery occlusion regardless. Suggest. CTA neck and head; further management as per [place] team.

## 2019-09-10 NOTE — DISCHARGE NOTE NURSING/CASE MANAGEMENT/SOCIAL WORK - NSDCPEPTSTRK_GEN_ALL_CORE
Stroke warning signs and symptoms/Signs and symptoms of stroke/Stroke support groups for patients, families, and friends/Need for follow up after discharge/Risk factors for stroke/Stroke education booklet/Call 911 for stroke/Prescribed medications

## 2019-09-10 NOTE — CONSULT NOTE ADULT - ASSESSMENT
The patient is a 74y Female with new right sided symptoms who received t-PA in the ER.     Stroke.   Now status post t-PA.   Admit MICU.  Hold antiplatelet and anticoagulants for 24 hrs.   Repeat imaging in 24 hrs.   Mobilize with physical therapy.   Check fasting lipid profile.   Continue statin.     Hypertension   Control blood pressure.     Case discussed with ER attending Dr Gandhi and with MICU team.

## 2019-09-10 NOTE — ED ADULT NURSE NOTE - NSIMPLEMENTINTERV_GEN_ALL_ED
Implemented All Universal Safety Interventions:  [place] to call system. Call bell, personal items and telephone within reach. Instruct patient to call for assistance. Room bathroom lighting operational. Non-slip footwear when patient is off stretcher. Physically safe environment: no spills, clutter or unnecessary equipment. Stretcher in lowest position, wheels locked, appropriate side rails in place.

## 2019-09-10 NOTE — DISCHARGE NOTE NURSING/CASE MANAGEMENT/SOCIAL WORK - NSDCFUADDAPPT_GEN_ALL_CORE_FT
PT WILL MAKE OWN F/U APT W DR SADIA CARTER   PT DECLINES HAVING ANY ISSUES W MEDICATION MANAGEMENT   PHARMACY PT WILL MAKE OWN F/U APT W DR SADIA CARTER   PT DECLINES HAVING ANY ISSUES W MEDICATION MANAGEMENT   PHARMACY : [facility] IN [place] ON [address]

## 2019-09-11 LAB
ANION GAP SERPL CALC-SCNC: 11 MMOL/L — SIGNIFICANT CHANGE UP (ref 5–17)
BUN SERPL-MCNC: 9 MG/DL — SIGNIFICANT CHANGE UP (ref 8–20)
CALCIUM SERPL-MCNC: 9.6 MG/DL — SIGNIFICANT CHANGE UP (ref 8.6–10.2)
CHLORIDE SERPL-SCNC: 105 MMOL/L — SIGNIFICANT CHANGE UP (ref 98–107)
CHOLEST SERPL-MCNC: 146 MG/DL — SIGNIFICANT CHANGE UP (ref 110–199)
CO2 SERPL-SCNC: 23 MMOL/L — SIGNIFICANT CHANGE UP (ref 22–29)
CREAT SERPL-MCNC: 0.35 MG/DL — LOW (ref 0.5–1.3)
GLUCOSE SERPL-MCNC: 93 MG/DL — SIGNIFICANT CHANGE UP (ref 70–115)
HBA1C BLD-MCNC: 5.4 % — SIGNIFICANT CHANGE UP (ref 4–5.6)
HCT VFR BLD CALC: 40.4 % — SIGNIFICANT CHANGE UP (ref 34.5–45)
HCV AB S/CO SERPL IA: 0.18 S/CO — SIGNIFICANT CHANGE UP (ref 0–0.99)
HCV AB SERPL-IMP: SIGNIFICANT CHANGE UP
HDLC SERPL-MCNC: 43 MG/DL — LOW
HGB BLD-MCNC: 13.7 G/DL — SIGNIFICANT CHANGE UP (ref 11.5–15.5)
LIPID PNL WITH DIRECT LDL SERPL: 86 MG/DL — SIGNIFICANT CHANGE UP
MAGNESIUM SERPL-MCNC: 2 MG/DL — SIGNIFICANT CHANGE UP (ref 1.6–2.6)
MCHC RBC-ENTMCNC: 31.2 PG — SIGNIFICANT CHANGE UP (ref 27–34)
MCHC RBC-ENTMCNC: 33.9 GM/DL — SIGNIFICANT CHANGE UP (ref 32–36)
MCV RBC AUTO: 92 FL — SIGNIFICANT CHANGE UP (ref 80–100)
PHOSPHATE SERPL-MCNC: 3.2 MG/DL — SIGNIFICANT CHANGE UP (ref 2.4–4.7)
PLATELET # BLD AUTO: 208 K/UL — SIGNIFICANT CHANGE UP (ref 150–400)
POTASSIUM SERPL-MCNC: 3.4 MMOL/L — LOW (ref 3.5–5.3)
POTASSIUM SERPL-SCNC: 3.4 MMOL/L — LOW (ref 3.5–5.3)
RBC # BLD: 4.39 M/UL — SIGNIFICANT CHANGE UP (ref 3.8–5.2)
RBC # FLD: 12.6 % — SIGNIFICANT CHANGE UP (ref 10.3–14.5)
SODIUM SERPL-SCNC: 139 MMOL/L — SIGNIFICANT CHANGE UP (ref 135–145)
TOTAL CHOLESTEROL/HDL RATIO MEASUREMENT: 3 RATIO — LOW (ref 3.3–7.1)
TRIGL SERPL-MCNC: 84 MG/DL — SIGNIFICANT CHANGE UP (ref 10–200)
WBC # BLD: 4.73 K/UL — SIGNIFICANT CHANGE UP (ref 3.8–10.5)
WBC # FLD AUTO: 4.73 K/UL — SIGNIFICANT CHANGE UP (ref 3.8–10.5)

## 2019-09-11 PROCEDURE — 99232 SBSQ HOSP IP/OBS MODERATE 35: CPT

## 2019-09-11 PROCEDURE — 70450 CT HEAD/BRAIN W/O DYE: CPT | Mod: 26

## 2019-09-11 PROCEDURE — 93880 EXTRACRANIAL BILAT STUDY: CPT | Mod: 26

## 2019-09-11 PROCEDURE — 70551 MRI BRAIN STEM W/O DYE: CPT | Mod: 26

## 2019-09-11 PROCEDURE — 99223 1ST HOSP IP/OBS HIGH 75: CPT

## 2019-09-11 RX ORDER — ENOXAPARIN SODIUM 100 MG/ML
40 INJECTION SUBCUTANEOUS DAILY
Refills: 0 | Status: DISCONTINUED | OUTPATIENT
Start: 2019-09-11 | End: 2019-09-13

## 2019-09-11 RX ORDER — POTASSIUM CHLORIDE 20 MEQ
40 PACKET (EA) ORAL ONCE
Refills: 0 | Status: COMPLETED | OUTPATIENT
Start: 2019-09-11 | End: 2019-09-11

## 2019-09-11 RX ORDER — ACETAMINOPHEN 500 MG
650 TABLET ORAL EVERY 6 HOURS
Refills: 0 | Status: DISCONTINUED | OUTPATIENT
Start: 2019-09-11 | End: 2019-09-13

## 2019-09-11 RX ORDER — ATORVASTATIN CALCIUM 80 MG/1
20 TABLET, FILM COATED ORAL AT BEDTIME
Refills: 0 | Status: DISCONTINUED | OUTPATIENT
Start: 2019-09-11 | End: 2019-09-11

## 2019-09-11 RX ORDER — ASPIRIN/CALCIUM CARB/MAGNESIUM 324 MG
81 TABLET ORAL DAILY
Refills: 0 | Status: DISCONTINUED | OUTPATIENT
Start: 2019-09-11 | End: 2019-09-13

## 2019-09-11 RX ORDER — ATORVASTATIN CALCIUM 80 MG/1
40 TABLET, FILM COATED ORAL AT BEDTIME
Refills: 0 | Status: DISCONTINUED | OUTPATIENT
Start: 2019-09-11 | End: 2019-09-13

## 2019-09-11 RX ORDER — LANSOPRAZOLE 15 MG/1
30 CAPSULE, DELAYED RELEASE ORAL
Refills: 0 | Status: DISCONTINUED | OUTPATIENT
Start: 2019-09-11 | End: 2019-09-12

## 2019-09-11 RX ORDER — AMLODIPINE BESYLATE 2.5 MG/1
2.5 TABLET ORAL ONCE
Refills: 0 | Status: COMPLETED | OUTPATIENT
Start: 2019-09-11 | End: 2019-09-11

## 2019-09-11 RX ORDER — POTASSIUM CHLORIDE 20 MEQ
40 PACKET (EA) ORAL ONCE
Refills: 0 | Status: DISCONTINUED | OUTPATIENT
Start: 2019-09-11 | End: 2019-09-11

## 2019-09-11 RX ORDER — ROSUVASTATIN CALCIUM 5 MG/1
20 TABLET ORAL AT BEDTIME
Refills: 0 | Status: DISCONTINUED | OUTPATIENT
Start: 2019-09-11 | End: 2019-09-11

## 2019-09-11 RX ORDER — ACETAMINOPHEN 500 MG
1000 TABLET ORAL ONCE
Refills: 0 | Status: COMPLETED | OUTPATIENT
Start: 2019-09-11 | End: 2019-09-11

## 2019-09-11 RX ORDER — LEVOTHYROXINE SODIUM 125 MCG
125 TABLET ORAL DAILY
Refills: 0 | Status: DISCONTINUED | OUTPATIENT
Start: 2019-09-11 | End: 2019-09-13

## 2019-09-11 RX ORDER — AMLODIPINE BESYLATE 2.5 MG/1
5 TABLET ORAL DAILY
Refills: 0 | Status: DISCONTINUED | OUTPATIENT
Start: 2019-09-12 | End: 2019-09-13

## 2019-09-11 RX ADMIN — Medication 125 MICROGRAM(S): at 05:06

## 2019-09-11 RX ADMIN — Medication 400 MILLIGRAM(S): at 02:43

## 2019-09-11 RX ADMIN — LANSOPRAZOLE 30 MILLIGRAM(S): 15 CAPSULE, DELAYED RELEASE ORAL at 16:34

## 2019-09-11 RX ADMIN — Medication 81 MILLIGRAM(S): at 16:32

## 2019-09-11 RX ADMIN — Medication 40 MILLIEQUIVALENT(S): at 05:06

## 2019-09-11 RX ADMIN — ATORVASTATIN CALCIUM 40 MILLIGRAM(S): 80 TABLET, FILM COATED ORAL at 22:27

## 2019-09-11 RX ADMIN — ENOXAPARIN SODIUM 40 MILLIGRAM(S): 100 INJECTION SUBCUTANEOUS at 21:59

## 2019-09-11 RX ADMIN — Medication 1000 MILLIGRAM(S): at 03:00

## 2019-09-11 RX ADMIN — AMLODIPINE BESYLATE 2.5 MILLIGRAM(S): 2.5 TABLET ORAL at 10:47

## 2019-09-11 NOTE — CHART NOTE - NSCHARTNOTEFT_GEN_A_CORE
Upon Nutritional Assessment by the Registered Dietitian your patient was determined to meet criteria / has evidence of the following diagnosis/diagnoses:          [ ]  Mild Protein Calorie Malnutrition        [ ]  Moderate Protein Calorie Malnutrition        [x] Severe Protein Calorie Malnutrition        [ ] Unspecified Protein Calorie Malnutrition        [ ] Underweight / BMI <19        [ ] Morbid Obesity / BMI > 40    Pt presents at high nutrition risk secondary to malnutrition (severe, chronic) related to inability to met sufficient protein-energy in setting of dysphagia and decreased appetite as evidenced by meeting <75% nutrient needs >1 month, severe muscle loss of temples, clavicles, shoulders, severe fat loss of orbitals and triceps, 11.9% x 10 months.  Findings as based on:  •  Comprehensive nutrition assessment and consultation  •  Calorie counts (nutrient intake analysis)  •  Food acceptance and intake status from observations by staff  •  Follow up  •  Patient education  •  Intervention secondary to interdisciplinary rounds  •   concerns      Treatment:    The following has been recommended:    1) Continue diet as tolerated.  2) Will provide Gelatein TID to optimize po intake and provide an additional 150 kcal, 20g protein per serving.  3) Rx: MVI daily.  4) Obtain daily weights.       PROVIDER Section:     By signing this assessment you are acknowledging and agree with the diagnosis/diagnoses assigned by the Registered Dietitian    Comments:

## 2019-09-11 NOTE — CONSULT NOTE ADULT - SUBJECTIVE AND OBJECTIVE BOX
74yF was admitted on 09-10 with right sided numbness/tingling of the right side of face and UE/LE. A head CT showed Age indeterminate left caudate lacunar infarct and s/p tPA. SLP evaluated and is on her baseline diet of puree/thins.     Patient reports a mild headache, no dizziness, no weakness. Numbness has improved.   Reports that she would like to go home.   Spoke to SW of expectations for dispo.     REVIEW OF SYSTEMS  Constitutional - No fever, No weight loss, No fatigue  HEENT - No eye pain, No visual disturbances, No difficulty hearing, No tinnitus, No vertigo, No neck pain  Respiratory - No cough, No wheezing, No shortness of breath  Cardiovascular - No chest pain, No palpitations  Gastrointestinal - No abdominal pain, No nausea, No vomiting, No diarrhea, No constipation  Genitourinary - No dysuria, No frequency, No hematuria, No incontinence  Neurological - No headaches, No memory loss, No loss of strength, +numbness, No tremors  Skin - No itching, No rashes, No lesions   Endocrine - No temperature intolerance  Musculoskeletal - No joint pain, No joint swelling, No muscle pain  Psychiatric - No depression, No anxiety    VITALS  T(C): 36.7 (09-11-19 @ 11:30), Max: 36.8 (09-10-19 @ 17:30)  HR: 60 (09-11-19 @ 11:35) (51 - 82)  BP: 167/76 (09-11-19 @ 11:35) (132/83 - 217/97)  RR: 20 (09-11-19 @ 11:35) (14 - 39)  SpO2: 99% (09-11-19 @ 11:35) (95% - 99%)  Wt(kg): --    PAST MEDICAL & SURGICAL HISTORY  PAD (peripheral artery disease)  MVP (mitral valve prolapse)  Aortic stenosis  Pancreatitis  Fibromyalgia  Hypothyroid  TIA (transient ischemic attack)  COPD (chronic obstructive pulmonary disease)  Hyperlipidemia  IBS (irritable bowel syndrome)  CAD (coronary artery disease)  Chest pain  H/O appendicitis  H/O cholecystitis  Coronary stent patent  S/P CABG x 3      SOCIAL HISTORY  Smoking - Denied  EtOH - Denied   Drugs - Denied    FUNCTIONAL HISTORY  Lives in senior living, 0 MC  Independent    CURRENT FUNCTIONAL STATUS  Eval pending    FAMILY HISTORY   Family history of heart disease      RECENT LABS/IMAGING  CBC Full  -  ( 11 Sep 2019 03:49 )  WBC Count : 4.73 K/uL  RBC Count : 4.39 M/uL  Hemoglobin : 13.7 g/dL  Hematocrit : 40.4 %  Platelet Count - Automated : 208 K/uL  Mean Cell Volume : 92.0 fl  Mean Cell Hemoglobin : 31.2 pg  Mean Cell Hemoglobin Concentration : 33.9 gm/dL  Auto Neutrophil # : x  Auto Lymphocyte # : x  Auto Monocyte # : x  Auto Eosinophil # : x  Auto Basophil # : x  Auto Neutrophil % : x  Auto Lymphocyte % : x  Auto Monocyte % : x  Auto Eosinophil % : x  Auto Basophil % : x    09-11    139  |  105  |  9.0  ----------------------------<  93  3.4<L>   |  23.0  |  0.35<L>    Ca    9.6      11 Sep 2019 03:49  Phos  3.2     09-11  Mg     2.0     09-11    TPro  8.3  /  Alb  4.3  /  TBili  0.2<L>  /  DBili  x   /  AST  25  /  ALT  20  /  AlkPhos  106  09-10        ALLERGIES  Bactrim (Faint)  chocolate (Other)  Cipro (Rash)  codeine (Nausea)  Flagyl (Faint)  Macrobid (Faint)  pantoprazole (Vomiting)  sulfa drugs (Faint)      MEDICATIONS   atorvastatin 20 milliGRAM(s) Oral at bedtime  influenza   Vaccine 0.5 milliLiter(s) IntraMuscular once  levothyroxine 125 MICROGram(s) Oral daily      ----------------------------------------------------------------------------------------  PHYSICAL EXAM  Constitutional - NAD, Comfortable  HEENT - NCAT, EOMI  Neck - Supple, No limited ROM  Chest - Breathing comfortably, No wheezing  Cardiovascular - S1S2   Abdomen - Soft   Extremities - No C/C/E, No calf tenderness   Neurologic Exam -                    Cognitive - Awake, Alert, AAO to self, place, date, year, situation     Communication - Fluent, No dysarthria     Cranial Nerves - CN 2-12 intact     Motor - No focal deficits                    LEFT    UE - ShAB 5/5, EF 5/5, EE 5/5, WE 5/5,  5/5                    RIGHT UE - ShAB 5/5, EF 5/5, EE 5/5, WE 5/5,  5/5                    LEFT    LE - HF 5/5, KE 5/5, DF 5/5, PF 5/5                    RIGHT LE - HF 5/5, KE 5/5, DF 5/5, PF 5/5        Sensory - Intact to LT     Coordination - FTN intact  Psychiatric - Mood stable, Affect WNL  ----------------------------------------------------------------------------------------  ASSESSMENT/PLAN  74yFemale with functional deficits after developing right sided numbness s/p tPA  R/O CVA - TTE planned, CT planned, PT/OT evals pending post-24 hours tPA  CVA PPX - Lipitor  HTN - Norvasc  DVT PPX - SCDs  Rehab - Will continue to follow for ongoing rehab needs and recommendations. Expect patient to achieve functional goals for DC HOME

## 2019-09-11 NOTE — OCCUPATIONAL THERAPY INITIAL EVALUATION ADULT - ADDITIONAL COMMENTS
Pt lives in first floor, single level senior apartment complex with no MC and no steps inside. Bathroom has shower stall with doors and grab bars. Pt owns shower chair, Rollator, cane, wheelchair. Pt is right handed. Pt does not drive. Pt's daughter lives local and assists with doctor appointments, food shopping and driving as needed. Pt takes [employer] bus as well when needed.

## 2019-09-11 NOTE — PROGRESS NOTE ADULT - SUBJECTIVE AND OBJECTIVE BOX
HOSPITALIST PROGRESS NOTE    CHEYENNE JOSEPH  [id number]  74yFemale    Patient is a 74y old  Female who presents with a chief complaint of     SUBJECTIVE:   Chart reviewed since admission. Discussed with ALEX Cantrell  Patient seen and examined at bedside in MICU for CVA.    74 year old female with PMH HTN, Dyslipidemia, CAD s/p CABG, PCI, hypothyroidism presented with right sided paresthesia and paresis - underwent t-PA with near complete resolution.    Denies any headache, dizziness, paresthesia, paresis (last symptom of strange feeling right side face and lips resolved this morning) visual or speech disturbance.    Denies any chest pain or palpitations      PMH - as above  PSH CABG, cholecystectomy, Appendectomy  ALL - Passes out with Sulfa (Bactrim, Flagyl and macrobid), rash with Cipro  Meds - Reviewed (having ASA. Plavix and Lipitor 20 at home)  FH - Reviewed and is non-contributory  SocHx - Remote smoker, lives in adult community, independent ADL      OBJECTIVE:  Vital Signs Last 24 Hrs  T(C): 36.7 (11 Sep 2019 11:30), Max: 36.8 (10 Sep 2019 17:30)  T(F): 98.1 (11 Sep 2019 11:30), Max: 98.2 (10 Sep 2019 17:30)  HR: 65 (11 Sep 2019 12:35) (51 - 82)  BP: 164/75 (11 Sep 2019 12:35) (132/83 - 178/101)  BP(mean): 96 (11 Sep 2019 07:35) (96 - 133)  RR: 18 (11 Sep 2019 12:35) (14 - 39)  SpO2: 98% (11 Sep 2019 12:35) (95% - 99%)    PHYSICAL EXAMINATION  General: Elderly  female sitting in chair NAD  HEENT:  extraocular movements intact, no facial asymmetry  NECK:  supple  CVS: regular rate and rhythm S1 S2, midline  RESP:  CTAB  GI:  Soft nondistended nontender BS+  : No suprapubic or CVA tenderness  MSK:  FROM, no edema  CNS:  AAOx3, no facial asymmetry extraocular movements intact, motor 5/5 bilateral UE, LE. No drift or past pointing. Sensation grossly intact. NIH 0  INTEG:  Warm dry skin  PSYCH:  Fair mood    MONITOR: SR  CAPILLARY BLOOD GLUCOSE            I&O's Summary    10 Sep 2019 07:01  -  11 Sep 2019 07:00  --------------------------------------------------------  IN: 565 mL / OUT: 600 mL / NET: -35 mL    11 Sep 2019 07:01  -  11 Sep 2019 14:06  --------------------------------------------------------  IN: 240 mL / OUT: 450 mL / NET: -210 mL                            13.7   4.73  )-----------( 208      ( 11 Sep 2019 03:49 )             40.4     PT/INR - ( 10 Sep 2019 12:51 )   PT: 11.9 sec;   INR: 1.03 ratio         PTT - ( 10 Sep 2019 12:51 )  PTT:32.0 sec  09-11    139  |  105  |  9.0  ----------------------------<  93  3.4<L>   |  23.0  |  0.35<L>    Ca    9.6      11 Sep 2019 03:49  Phos  3.2     09-11  Mg     2.0     09-11    TPro  8.3  /  Alb  4.3  /  TBili  0.2<L>  /  DBili  x   /  AST  25  /  ALT  20  /  AlkPhos  106  09-10    CARDIAC MARKERS ( 10 Sep 2019 12:51 )  x     / <0.01 ng/mL / x     / x     / x        Lipid Profile (09.11.19 @ 03:49)    Total Cholesterol/HDL Ratio Measurement: 3.0 Ratio    Cholesterol, Serum: 146 mg/dL    Triglycerides, Serum: 84 mg/dL    HDL Cholesterol, Serum: 43:    Hemoglobin A1C, Whole Blood: 5.4 % (09.11.19 @ 03:49)      	    Culture:    TTE:    RADIOLOGY  < from: CT Angio Neck w/ IV Cont (09.10.19 @ 14:12) >     EXAM:  CT ANGIO NECK (W)AW IC                         EXAM:  CT ANGIO BRAIN (W)AW IC                          PROCEDURE DATE:  09/10/2019          INTERPRETATION:  EXAMS:  1.  CT ANGIOGRAPHY NECK WITH INTRAVENOUS CONTRAST.  2.  CT ANGIOGRAPHY BRAIN WITH INTRAVENOUS CONTRAST.    CLINICAL HISTORY: R-sided numbness/weakness. . .     TECHNIQUE: Contrast enhanced axial CT images were acquired from the   aortic arch to the vertex of the calvarium, during the angiographic   phase.  Three-dimensional maximum intensity projection reformats were   generated.  90 ml of Omnipaque-350 mg/ml were administered intravenously,   without immediate complication.    COMPARISON STUDY: CT head 9/10/2019. MRI brain 7/1/2019. CT neck   11/27/2018.    FINDINGS:     CT ANGIOGRAPHY NECK:     Thoracic aorta and branch vessels: Calcified plaque. Incidental direct   origin of the left vertebral artery from the aortic arch.    Right carotid system: Calcified plaque of the proximal right cervical ICA   results in approximately 60% stenosis by NASCET criteria.    Left carotid system: Calcified plaque at the proximal left cervical ICA   results in approximately 60% stenosis by NASCET criteria.    Vertebral arteries: Moderate to severe stenosis of the left V3 segment.   Dominant right vertebral artery.    Soft tissues of the neck: Right sphenoid and left maxillary sinus polyps   or retention cysts.    Visualized spine: Degenerative changes in spine.    Visualized upper chest: Emphysema.    CT ANGIOGRAPHY BRAIN:    Internal carotid arteries: Patent.     Anterior cerebral arteries: Patent.     Middle cerebral arteries: Patent.      Posterior cerebral arteries: Patent.     Vertebrobasilar: Patent. Mild narrowing of the left intradural vertebral   artery, which terminates predominantly in the left PICA. Dominant right   intradural vertebral artery. Mild stenoses of the distal right intradural   vertebral and mid basilar arteries. Branch vasculature of the posterior   circulation is within normal limits.     Vascular lesions: No evidence of intracranial aneurysm or large vascular   malformation, within limits of CT technique    IMPRESSION:   CT angiography neck: Approximately 60% stenosis bilateral proximal   cervical ICAs by NASCET criteria.   Moderate to severe stenosis of the left V3 segment.    CT angiography brain: No major vessel occlusion. No evidence of aneurysm.                ALCIDES JAVED   This document has been electronically signed. Sep 10 2019  2:44PM        < end of copied text >        MEDICATIONS  (STANDING):  aspirin enteric coated 81 milliGRAM(s) Oral daily  atorvastatin 20 milliGRAM(s) Oral at bedtime  enoxaparin Injectable 40 milliGRAM(s) SubCutaneous daily  influenza   Vaccine 0.5 milliLiter(s) IntraMuscular once  lansoprazole Capsule 30 milliGRAM(s) Oral before breakfast  levothyroxine 125 MICROGram(s) Oral daily  levothyroxine 125 MICROGram(s) Oral daily      MEDICATIONS  (PRN):

## 2019-09-11 NOTE — PROGRESS NOTE ADULT - SUBJECTIVE AND OBJECTIVE BOX
Patient is a 74y old  Female who presents with a chief complaint of     BRIEF HOSPITAL COURSE: ***    Events last 24 hours: ***    PAST MEDICAL & SURGICAL HISTORY:  PAD (peripheral artery disease)  MVP (mitral valve prolapse)  Aortic stenosis  Pancreatitis: 2011  Fibromyalgia  Hypothyroid  TIA (transient ischemic attack)  COPD (chronic obstructive pulmonary disease)  Hyperlipidemia  IBS (irritable bowel syndrome)  CAD (coronary artery disease)  Chest pain  H/O appendicitis  H/O cholecystitis  Coronary stent patent: 2016 RCA  S/P CABG x 3: 2015    Allergies    Bactrim (Faint)  chocolate (Other)  Cipro (Rash)  Flagyl (Faint)  Macrobid (Faint)  sulfa drugs (Faint)    Intolerances    codeine (Nausea)  pantoprazole (Vomiting)    FAMILY HISTORY:  Family history of heart disease      Social History:   Former smoker, 2PPD x 30 years.  No ETOH or illicit drug use.       Review of Systems:  All ROS negative except as appreciated above.       Vitals During Exam:   HR: 65  BP: 164/75 mmHg  RR: 18  sPO2: 98% RA    Physical Examination:    General: Elderly female, lying in bed, appears comfortable.     HEENT: Pupils equal, reactive to light.  Symmetric.    PULM: Clear to auscultation bilaterally, no significant sputum production    CVS: Regular rate and rhythm, no murmurs, rubs, or gallops    ABD: Soft, nondistended, nontender, normoactive bowel sounds, no masses    EXT: No edema, nontender    SKIN: Warm and well perfused, no rashes noted.    NEURO: Alert, oriented, interactive, nonfocal      Medications:  aspirin enteric coated 81 milliGRAM(s) Oral daily  enoxaparin Injectable 40 milliGRAM(s) SubCutaneous daily  atorvastatin 20 milliGRAM(s) Oral at bedtime  levothyroxine 125 MICROGram(s) Oral daily  influenza   Vaccine 0.5 milliLiter(s) IntraMuscular once      ICU Vital Signs Last 24 Hrs  T(C): 36.7 (11 Sep 2019 11:30), Max: 36.8 (10 Sep 2019 17:30)  T(F): 98.1 (11 Sep 2019 11:30), Max: 98.2 (10 Sep 2019 17:30)  HR: 65 (11 Sep 2019 12:35) (51 - 82)  BP: 164/75 (11 Sep 2019 12:35) (132/83 - 178/101)  BP(mean): 96 (11 Sep 2019 07:35) (96 - 133)  ABP: --  ABP(mean): --  RR: 18 (11 Sep 2019 12:35) (14 - 39)  SpO2: 98% (11 Sep 2019 12:35) (95% - 99%)    Vital Signs Last 24 Hrs  T(C): 36.7 (11 Sep 2019 11:30), Max: 36.8 (10 Sep 2019 17:30)  T(F): 98.1 (11 Sep 2019 11:30), Max: 98.2 (10 Sep 2019 17:30)  HR: 65 (11 Sep 2019 12:35) (51 - 82)  BP: 164/75 (11 Sep 2019 12:35) (132/83 - 178/101)  BP(mean): 96 (11 Sep 2019 07:35) (96 - 133)  RR: 18 (11 Sep 2019 12:35) (14 - 39)  SpO2: 98% (11 Sep 2019 12:35) (95% - 99%)      I&O's Detail    10 Sep 2019 07:01  -  11 Sep 2019 07:00  --------------------------------------------------------  IN:    Oral Fluid: 365 mL    Solution: 200 mL  Total IN: 565 mL    OUT:    Voided: 600 mL  Total OUT: 600 mL  Total NET: -35 mL      11 Sep 2019 07:01  -  11 Sep 2019 13:58  --------------------------------------------------------  IN:    Oral Fluid: 240 mL  Total IN: 240 mL    OUT:    Voided: 450 mL  Total OUT: 450 mL  Total NET: -210 mL      LABS:                        13.7   4.73  )-----------( 208      ( 11 Sep 2019 03:49 )             40.4     09-11    139  |  105  |  9.0  ----------------------------<  93  3.4<L>   |  23.0  |  0.35<L>    Ca    9.6      11 Sep 2019 03:49  Phos  3.2     09-11  Mg     2.0     09-11    TPro  8.3  /  Alb  4.3  /  TBili  0.2<L>  /  DBili  x   /  AST  25  /  ALT  20  /  AlkPhos  106  09-10      CARDIAC MARKERS ( 10 Sep 2019 12:51 )  x     / <0.01 ng/mL / x     / x     / x          CAPILLARY BLOOD GLUCOSE  POCT Blood Glucose.: 126 mg/dL (10 Sep 2019 12:18)      PT/INR - ( 10 Sep 2019 12:51 )   PT: 11.9 sec;   INR: 1.03 ratio    PTT - ( 10 Sep 2019 12:51 )  PTT:32.0 sec      RADIOLOGY:   < from: CT Head No Cont (09.11.19 @ 12:48) >   EXAM:  CT BRAIN                          PROCEDURE DATE:  09/11/2019          INTERPRETATION:  CLINICAL Indications:  s/p TPA    COMPARISON: CT head dated 9/10/2019    TECHNIQUE: Noncontrast CT of the head. Multiplanar reformations are   submitted.    FINDINGS: Chronic left caudate head lacunar infarct.  There is periventricular and subcortical white matter hypodensity without   mass effect, nonspecific, likely representing mild-to-moderate chronic   microvascular ischemic changes. There is nocompelling evidence for an   acute transcortical infarction. There is no evidence of mass, mass   effect, midline shift or extra-axial fluid collection. The lateral   ventricles and cortical sulci are age-appropriate in size and   configuration. 1 cmright sphenoid sinus mucous retention cyst. The   orbits, mastoid air cells and visualized paranasal sinuses are otherwise   unremarkable. The calvarium is intact.    IMPRESSION:  Mild to moderate chronic microvascular changes without   evidence of an acute transcortical infarction or hemorrhage. MR is a more   sensitive imaging modality for the evaluation of an acute infarction.                 VERO LOREDO M.D., ATTENDING RADIOLOGIST  This document has been electronically signed. Sep 11 2019  1:15PM    < end of copied text >        SUPPLEMENTAL O2: N  LINES: Peripheral   IVF: N  SIMPSON: N  PPx: Lovenox, SCD,   CONTACT: N Patient is a 74y old  Female who presents with a chief complaint of     BRIEF HOSPITAL COURSE:   73 y/o female with PMH of CABG, CAD s/p stents, HLD, Carotid stenosis, hypothyroidism, prior TIAs presents to [facility] this morning for numbness and tinging on her right side (face, hand, and lower leg). Started around 830-900 and progressively got worse, which prompted her to present to [facility]. On arrival to [facility], code stoke called, CT head was negative and she received tPA @ 12:35. CTA with no vascular abnormalities, however evidence of 60% carotid stenosis b/l. Patient seen and examined at the bedside. She is sitting up in bed. Complains of numbness in her hand, right sided face, and right lower leg. She states it seems to be getting a bit better post tPA administration. Pt admitted to ICU.      Events last 24 hours:   repeat CT scan 24hours post TPA unrevealing   may resume aspirin and all home meds   neurologically no deficits     PAST MEDICAL & SURGICAL HISTORY:  PAD (peripheral artery disease)  MVP (mitral valve prolapse)  Aortic stenosis  Pancreatitis: 2011  Fibromyalgia  Hypothyroid  TIA (transient ischemic attack)  COPD (chronic obstructive pulmonary disease)  Hyperlipidemia  IBS (irritable bowel syndrome)  CAD (coronary artery disease)  Chest pain  H/O appendicitis  H/O cholecystitis  Coronary stent patent: 2016 RCA  S/P CABG x 3: 2015    Allergies    Bactrim (Faint)  chocolate (Other)  Cipro (Rash)  Flagyl (Faint)  Macrobid (Faint)  sulfa drugs (Faint)    Intolerances    codeine (Nausea)  pantoprazole (Vomiting)    FAMILY HISTORY:  Family history of heart disease      Social History:   Former smoker, 2PPD x 30 years.  No ETOH or illicit drug use.       Review of Systems:  All ROS negative except as appreciated above.       Vitals During Exam:   HR: 65  BP: 164/75 mmHg  RR: 18  sPO2: 98% RA    Physical Examination:    General: Elderly female, lying in bed, appears comfortable.     HEENT: Pupils equal, reactive to light.  Symmetric.    PULM: Clear to auscultation bilaterally, no significant sputum production    CVS: Regular rate and rhythm, no murmurs, rubs, or gallops    ABD: Soft, nondistended, nontender, normoactive bowel sounds, no masses    EXT: No edema, nontender    SKIN: Warm and well perfused, no rashes noted.    NEURO: Alert, oriented, interactive, nonfocal      Medications:  aspirin enteric coated 81 milliGRAM(s) Oral daily  enoxaparin Injectable 40 milliGRAM(s) SubCutaneous daily  atorvastatin 20 milliGRAM(s) Oral at bedtime  levothyroxine 125 MICROGram(s) Oral daily  influenza   Vaccine 0.5 milliLiter(s) IntraMuscular once      ICU Vital Signs Last 24 Hrs  T(C): 36.7 (11 Sep 2019 11:30), Max: 36.8 (10 Sep 2019 17:30)  T(F): 98.1 (11 Sep 2019 11:30), Max: 98.2 (10 Sep 2019 17:30)  HR: 65 (11 Sep 2019 12:35) (51 - 82)  BP: 164/75 (11 Sep 2019 12:35) (132/83 - 178/101)  BP(mean): 96 (11 Sep 2019 07:35) (96 - 133)  RR: 18 (11 Sep 2019 12:35) (14 - 39)  SpO2: 98% (11 Sep 2019 12:35) (95% - 99%)        I&O's Detail    10 Sep 2019 07:01  -  11 Sep 2019 07:00  --------------------------------------------------------  IN:    Oral Fluid: 365 mL    Solution: 200 mL  Total IN: 565 mL    OUT:    Voided: 600 mL  Total OUT: 600 mL  Total NET: -35 mL      11 Sep 2019 07:01  -  11 Sep 2019 13:58  --------------------------------------------------------  IN:    Oral Fluid: 240 mL  Total IN: 240 mL    OUT:    Voided: 450 mL  Total OUT: 450 mL  Total NET: -210 mL      LABS:                        13.7   4.73  )-----------( 208      ( 11 Sep 2019 03:49 )             40.4     09-11    139  |  105  |  9.0  ----------------------------<  93  3.4<L>   |  23.0  |  0.35<L>    Ca    9.6      11 Sep 2019 03:49  Phos  3.2     09-11  Mg     2.0     09-11    TPro  8.3  /  Alb  4.3  /  TBili  0.2<L>  /  DBili  x   /  AST  25  /  ALT  20  /  AlkPhos  106  09-10      CARDIAC MARKERS ( 10 Sep 2019 12:51 )  x     / <0.01 ng/mL / x     / x     / x          CAPILLARY BLOOD GLUCOSE  POCT Blood Glucose.: 126 mg/dL (10 Sep 2019 12:18)      PT/INR - ( 10 Sep 2019 12:51 )   PT: 11.9 sec;   INR: 1.03 ratio    PTT - ( 10 Sep 2019 12:51 )  PTT:32.0 sec      RADIOLOGY:   < from: CT Head No Cont (09.11.19 @ 12:48) >   EXAM:  CT BRAIN                          PROCEDURE DATE:  09/11/2019          INTERPRETATION:  CLINICAL Indications:  s/p TPA    COMPARISON: CT head dated 9/10/2019    TECHNIQUE: Noncontrast CT of the head. Multiplanar reformations are   submitted.    FINDINGS: Chronic left caudate head lacunar infarct.  There is periventricular and subcortical white matter hypodensity without   mass effect, nonspecific, likely representing mild-to-moderate chronic   microvascular ischemic changes. There is nocompelling evidence for an   acute transcortical infarction. There is no evidence of mass, mass   effect, midline shift or extra-axial fluid collection. The lateral   ventricles and cortical sulci are age-appropriate in size and   configuration. 1 cmright sphenoid sinus mucous retention cyst. The   orbits, mastoid air cells and visualized paranasal sinuses are otherwise   unremarkable. The calvarium is intact.    IMPRESSION:  Mild to moderate chronic microvascular changes without   evidence of an acute transcortical infarction or hemorrhage. MR is a more   sensitive imaging modality for the evaluation of an acute infarction.                 VERO LOREDO M.D., ATTENDING RADIOLOGIST  This document has been electronically signed. Sep 11 2019  1:15PM            SUPPLEMENTAL O2: N  LINES: Peripheral   IVF: N  SIMPSON: LARON  PPx: Lovenox, SCD,   CONTACT: N

## 2019-09-11 NOTE — SWALLOW BEDSIDE ASSESSMENT ADULT - SWALLOW EVAL: RECOMMENDED FEEDING/EATING TECHNIQUES
position upright (90 degrees)/alternate food with liquid/maintain upright posture for at least 1 hour 2* h/o GERD/hard swallow w/ each bite or sip/small sips/bites/oral hygiene/crush medication (when feasible)

## 2019-09-11 NOTE — PHYSICAL THERAPY INITIAL EVALUATION ADULT - PERTINENT HX OF CURRENT PROBLEM, REHAB EVAL
74yF was admitted on 09-10 with right sided numbness/tingling of the right side of face and UE/LE. A head CT showed Age indeterminate left caudate lacunar infarct and s/p tPA.

## 2019-09-11 NOTE — SWALLOW BEDSIDE ASSESSMENT ADULT - ASR SWALLOW ASPIRATION MONITOR
change of breathing pattern/cough/gurgly voice/oral hygiene/position upright (90Y)/throat clearing/upper respiratory infection/fever/pneumonia

## 2019-09-11 NOTE — OCCUPATIONAL THERAPY INITIAL EVALUATION ADULT - PERTINENT HX OF CURRENT PROBLEM, REHAB EVAL
Pt presents to ED with c/o numbness and weakness in my right UE/LE and facial droop. Initial head CT reveals age indeterminate left caudate lacunar infarct. Post-tPA head CT reveals mild to moderate chronic microvascular changes without evidence of an acute transcortical infarction or hemorrhage.

## 2019-09-11 NOTE — DIETITIAN INITIAL EVALUATION ADULT. - ETIOLOGY
related to inability to met sufficient protein-energy in setting of dysphagia and decreased appetite

## 2019-09-11 NOTE — SPEECH LANGUAGE PATHOLOGY EVALUATION - SLP GENERAL OBSERVATIONS
Pt received A&A, 0x3, cooperative, denies speech/language difficulty, +mild vocal hoarseness (pt reports as baseline)

## 2019-09-11 NOTE — CHART NOTE - NSCHARTNOTEFT_GEN_A_CORE
Notified by RN that patient declined crestor medication and request to resume Lipitor. medication orders changed.

## 2019-09-11 NOTE — SWALLOW BEDSIDE ASSESSMENT ADULT - SLP PERTINENT HISTORY OF CURRENT PROBLEM
Pt known to this service and is s/p a MBS study 11/27/2018 at which time swallow function was deemed WFL(see full impressions below). Pt reports h/o continued globus sensation with multiple evaluations by ENT, GI followign her d/c and is currently followed by Dr. Omar Steen from GI.  Pt reports findings of GERD and ?esophageal dysmotility. Pt is s/p upper endoscopy 6/28/19; radiologsit report indicates "normal esophagus."  Pt reports that all diet rx have been for puree/thin liquid diet, which pt reports she had been tolerating PTA. Pt with PMH remarkable for CABG, CAD s/p stemts, HLD, hypothyroid, prior TIAs and now admitted to r/o CVA and is s/p tPA 9/10 @12:35. Head CT "age indeterminate left caudate nucleus lacunar infarcts"

## 2019-09-11 NOTE — PHYSICAL THERAPY INITIAL EVALUATION ADULT - ADDITIONAL COMMENTS
pt lives alone in an apartment at a senior living complex. pt has 0 stairs. pt owns SAC, rollator, transport w/c, shower bench.

## 2019-09-11 NOTE — DIETITIAN INITIAL EVALUATION ADULT. - PERTINENT LABORATORY DATA
09-11 Na139 mmol/L Glu 93 mg/dL K+ 3.4 mmol/L<L> Cr  0.35 mg/dL<L> BUN 9.0 mg/dL Phos 3.2 mg/dL Alb n/a   PAB n/a

## 2019-09-11 NOTE — PROGRESS NOTE ADULT - SUBJECTIVE AND OBJECTIVE BOX
[facility]                                        Ne[facility] [place]                                  [address]                                      [address]                                           [place], [place], [address]                                                [phone]        CC: Stroke    HISTORY:  The patient is a 74y Female who presented with right sided numbness and weakness which began the morning of arrival around 8:15 am. She arrived around noon and Code Stroke protocol was activated and the patient was given IV t-PA.   I was called after the infusion was completed.  She now reports residual symptoms on the right side. (JW 9/10/19)    Interval history: improved right sided symptoms    ROS neurology: Denies headache or dizziness. Denies weakness/numbness.  Denies speech/language deficits. Denies diplopia/blurred vision.  Denies confusion    MEDICATIONS  (STANDING):  aspirin enteric coated 81 milliGRAM(s) Oral daily  enoxaparin Injectable 40 milliGRAM(s) SubCutaneous daily  influenza   Vaccine 0.5 milliLiter(s) IntraMuscular once  lansoprazole Capsule 30 milliGRAM(s) Oral before breakfast  levothyroxine 125 MICROGram(s) Oral daily  rosuvastatin 20 milliGRAM(s) Oral at bedtime    MEDICATIONS  (PRN):  acetaminophen   Tablet .. 650 milliGRAM(s) Oral every 6 hours PRN Mild Pain (1 - 3)      Vital Signs Last 24 Hrs    T(C): 36.7 (11 Sep 2019 11:30), Max: 36.8 (10 Sep 2019 17:30)  T(F): 98.1 (11 Sep 2019 11:30), Max: 98.2 (10 Sep 2019 17:30)  HR: 65 (11 Sep 2019 12:35) (51 - 82)  BP: 164/75 (11 Sep 2019 12:35) (132/83 - 178/101)  BP(mean): 96 (11 Sep 2019 07:35) (96 - 133)  RR: 18 (11 Sep 2019 12:35) (14 - 39)  SpO2: 98% (11 Sep 2019 12:35) (95% - 99%)    Detailed Neuro exam    Mental status: The patient is awake, alert, and fully oriented. There is no aphasia.    Cranial nerves:  Pupils react symmetrically to light. There is no visual field deficit to confrontation. Extraocular motion is full with no nystagmus. There is no ptosis. Facial sensation is intact. Facial musculature is symmetric.  Palate elevates symmetrically. Tongue is midline.    Motor: There is normal bulk and tone.  Strength is 5/5 in the right arm and leg.   Strength is 5/5 in the left arm and leg.    Sensation: normal pp/FT in 4 ext    Reflexes: 2+ throughout and plantar responses are flexor.    Cerebellar: There is no dysmetria on finger to nose testing.        LABS:                                    13.7   4.73  )-----------( 208      ( 11 Sep 2019 03:49 )             40.4     09-11    139  |  105  |  9.0  ----------------------------<  93  3.4<L>   |  23.0  |  0.35<L>    Ca    9.6      11 Sep 2019 03:49  Phos  3.2     09-11  Mg     2.0     09-11    TPro  8.3  /  Alb  4.3  /  TBili  0.2<L>  /  DBili  x   /  AST  25  /  ALT  20  /  AlkPhos  106  09-10    LIVER FUNCTIONS - ( 10 Sep 2019 12:51 )  Alb: 4.3 g/dL / Pro: 8.3 g/dL / ALK PHOS: 106 U/L / ALT: 20 U/L / AST: 25 U/L / GGT: x           PT/INR - ( 10 Sep 2019 12:51 )   PT: 11.9 sec;   INR: 1.03 ratio         PTT - ( 10 Sep 2019 12:51 )  PTT:32.0 sec    Lipid Profile (09.11.19 @ 03:49)    Total Cholesterol/HDL Ratio Measurement: 3.0 Ratio    Cholesterol, Serum: 146 mg/dL    Triglycerides, Serum: 84 mg/dL    HDL Cholesterol, Serum: 43: HDL Levels >/= 60 mg/dL are considered beneficial and a "negative" risk  factor.  Effective 08/15/2018: New reference range and interpretive comment. mg/dL    Direct LDL: 86:         RADIOLOGY   follow up head CT 9/11 no acute CVA, mass or blood  (+) SVID  CT head: There is no acute pathology. There is what appears to be a left caudate infarct which is of indeterminate age.   CT-A of the neck shows ~60% stenosis of the bilateral ICAs in the cervical region.   CT-A of the brain shows no sign of large vessel occlusion.

## 2019-09-11 NOTE — SWALLOW BEDSIDE ASSESSMENT ADULT - COMMENTS
Per 11/2019 [facility] [place]:  "Oral and pharyngeal stage of swallow judged to be WFL. Prior to po trials noted a raised soft tissue in the pharynx (C2-C3) After each trial of po pt continued to c/o of a globus sensation in the pharynx even though there was no visible po in the pharynx." Additional solids not administered 2* puree is pt's baseline diet per her report.

## 2019-09-11 NOTE — DIETITIAN INITIAL EVALUATION ADULT. - ADD RECOMMEND
will provide Gelatein TID to optimize po intake and provide an additional 150 kcal, 20g protein per serving. Encourage po intake at all meals. Rx: MVI daily. Obtain daily weights.

## 2019-09-11 NOTE — DIETITIAN INITIAL EVALUATION ADULT. - OTHER INFO
74 year old female PMH of CABG, CAD s/p stents, HLD, carotid stenosis, hypothyroidism, prior TIAs presents with numbness. Code amy called, CT head was negative and she received tPA. Pt reports prolonged poor appetite/po intake. States she started experiencing swallowing difficulty in 11/2018 in which an MBS was performed and a pureed diet/thin liquids was recommended. Since then, she has been following a pureed diet at home. States recently she has bought plant-based protein powder to drink at home as she knows she does not consume enough food. Confirms weight loss, states she used to weigh 135 lbs and now down to 119 lbs. Tolerating diet at this time. Breakfast tray observed at bedside, ~25% consumed per plate waste. Assisted pt with menu. Offered pt ensure, however, she declined at this time- receptive to trial Gelatein.

## 2019-09-11 NOTE — SPEECH LANGUAGE PATHOLOGY EVALUATION - COMMENTS
Receptive language WFL Expressive language WFL Appears WFL based on informal assessment Motor speech WFL Mildly hoarse vocal quality, c/w baseline status as per pt report. Pt followed by ENT, GI, Pt reports MD suspect vocal hoarness 2* GERD.

## 2019-09-12 LAB
ANION GAP SERPL CALC-SCNC: 11 MMOL/L — SIGNIFICANT CHANGE UP (ref 5–17)
BUN SERPL-MCNC: 17 MG/DL — SIGNIFICANT CHANGE UP (ref 8–20)
CALCIUM SERPL-MCNC: 9.6 MG/DL — SIGNIFICANT CHANGE UP (ref 8.6–10.2)
CHLORIDE SERPL-SCNC: 104 MMOL/L — SIGNIFICANT CHANGE UP (ref 98–107)
CO2 SERPL-SCNC: 22 MMOL/L — SIGNIFICANT CHANGE UP (ref 22–29)
CREAT SERPL-MCNC: 0.47 MG/DL — LOW (ref 0.5–1.3)
GLUCOSE SERPL-MCNC: 89 MG/DL — SIGNIFICANT CHANGE UP (ref 70–115)
POTASSIUM SERPL-MCNC: 4.2 MMOL/L — SIGNIFICANT CHANGE UP (ref 3.5–5.3)
POTASSIUM SERPL-SCNC: 4.2 MMOL/L — SIGNIFICANT CHANGE UP (ref 3.5–5.3)
SODIUM SERPL-SCNC: 137 MMOL/L — SIGNIFICANT CHANGE UP (ref 135–145)
TSH SERPL-MCNC: 0.19 UIU/ML — LOW (ref 0.27–4.2)

## 2019-09-12 PROCEDURE — 99232 SBSQ HOSP IP/OBS MODERATE 35: CPT

## 2019-09-12 PROCEDURE — 93306 TTE W/DOPPLER COMPLETE: CPT | Mod: 26

## 2019-09-12 RX ORDER — LANSOPRAZOLE 15 MG/1
30 CAPSULE, DELAYED RELEASE ORAL
Refills: 0 | Status: DISCONTINUED | OUTPATIENT
Start: 2019-09-12 | End: 2019-09-13

## 2019-09-12 RX ADMIN — Medication 650 MILLIGRAM(S): at 15:58

## 2019-09-12 RX ADMIN — Medication 81 MILLIGRAM(S): at 11:18

## 2019-09-12 RX ADMIN — AMLODIPINE BESYLATE 5 MILLIGRAM(S): 2.5 TABLET ORAL at 05:11

## 2019-09-12 RX ADMIN — LANSOPRAZOLE 30 MILLIGRAM(S): 15 CAPSULE, DELAYED RELEASE ORAL at 17:50

## 2019-09-12 RX ADMIN — Medication 650 MILLIGRAM(S): at 14:40

## 2019-09-12 RX ADMIN — ATORVASTATIN CALCIUM 40 MILLIGRAM(S): 80 TABLET, FILM COATED ORAL at 22:15

## 2019-09-12 RX ADMIN — LANSOPRAZOLE 30 MILLIGRAM(S): 15 CAPSULE, DELAYED RELEASE ORAL at 08:30

## 2019-09-12 RX ADMIN — ENOXAPARIN SODIUM 40 MILLIGRAM(S): 100 INJECTION SUBCUTANEOUS at 11:18

## 2019-09-12 RX ADMIN — Medication 125 MICROGRAM(S): at 05:11

## 2019-09-12 NOTE — PROGRESS NOTE ADULT - SUBJECTIVE AND OBJECTIVE BOX
Patient doing well, reports no pain.  Worked with PT and are recommending home.   Patient is happy. Would like home care first.     REVIEW OF SYSTEMS  Constitutional - No fever,  No fatigue  HEENT - No vertigo, No neck pain  Neurological - No headaches, No memory loss, No loss of strength, No numbness, No tremors  Skin - No rashes, No lesions   Musculoskeletal - No joint pain, No joint swelling, No muscle pain  Psychiatric - No depression, +anxiety    FUNCTIONAL PROGRESS  9/12  Sit-Stand Transfer Training  Sit-to-Stand Transfer Training Rehab Potential: good, to achieve stated therapy goals  Sit-to-Stand Transfer Training Symptoms Noted During/After Treatment: none  Transfer Training Sit-to-Stand Transfer: supervsion;  verbal cues;  full weight-bearing  Transfer Training Stand-to-Sit Transfer: supervsion;  verbal cues;  full weight-bearing  Sit-to-Stand Transfer Training Transfer Safety Analysis: decreased balance;  wide base of support ;  decreased flexibility;  decreased strength;  impaired balance    Gait Training  Gait Training Rehab Potential: good, to achieve stated therapy goals  Gait Training Symptoms Noted During/After Treatment: none  Gait Training: supervsion;  verbal cues;  full weight-bearing   100 feet  Gait Analysis: 2-point gait   decreased nish;  decreased hip/knee flexion;  decreased velocity of limb motion;  decreased step length;  decreased flexibility;  decreased strength;  impaired balance        VITALS  T(C): 36.6 (09-12-19 @ 12:51), Max: 36.8 (09-12-19 @ 07:42)  HR: 64 (09-12-19 @ 12:51) (50 - 67)  BP: 110/62 (09-12-19 @ 12:51) (110/62 - 158/75)  RR: 14 (09-12-19 @ 12:51) (14 - 137)  SpO2: 99% (09-12-19 @ 12:45) (94% - 99%)  Wt(kg): --    MEDICATIONS   acetaminophen   Tablet .. 650 milliGRAM(s) every 6 hours PRN  amLODIPine   Tablet 5 milliGRAM(s) daily  aspirin enteric coated 81 milliGRAM(s) daily  atorvastatin 40 milliGRAM(s) at bedtime  enoxaparin Injectable 40 milliGRAM(s) daily  influenza   Vaccine 0.5 milliLiter(s) once  lansoprazole Capsule 30 milliGRAM(s) before breakfast  levothyroxine 125 MICROGram(s) daily      RECENT LABS - Reviewed                        13.7   4.73  )-----------( 208      ( 11 Sep 2019 03:49 )             40.4     09-12    137  |  104  |  17.0  ----------------------------<  89  4.2   |  22.0  |  0.47<L>    Ca    9.6      12 Sep 2019 05:48  Phos  3.2     09-11  Mg     2.0     09-11            MRI ind reviewed - mild to moderate periventricular deep and bifrontal   biparietal subcortical white matter ischemia.    ------------------------------------------------------------------------  PHYSICAL EXAM  Constitutional - NAD, Comfortable  Extremities - No C/C/E, No calf tenderness   Neurologic Exam -                    Motor - No focal deficits  Psychiatric - Mood stable, Affect WNL  ----------------------------------------------------------------------------------------  ASSESSMENT/PLAN  74yFemale with functional deficits after developing right sided numbness s/p tPA  R/O CVA - TTE planned, CT planned, PT/OT evals pending post-24 hours tPA  CVA PPX - Lipitor  HTN - Norvasc  DVT PPX - SCDs  Rehab - Recommend DC HOME with HC in 1-2 days with ongoing bedside therapy, currently at supervision level.     Will sign off, please reconsult for additional rehab dispo needs if functional status changes.

## 2019-09-12 NOTE — PROGRESS NOTE ADULT - ASSESSMENT
71 year old female with PMH HTN Dyslipidemia CAD s/p CABG, PCI Cartoid stenosis Hypothyroidism presented with right sided paresthesiae and paresis.  Underwent tPA with improvement in symptoms and resolution within 24 hours. Repeat CTH negative for ICH. CTA neck with bilateral JUAN J 60%.    Acute Right Hemispheric stroke - s/p tPA with resolution of symptoms. Doing well. LDL 86 A1c 5.4  - Continue to monitor  - ASA resumed  - Increase Lipitor to 40mg (target <70)  - TTE  - MR brain  - Carotid dopplers  - PT/OT/PMR follow up    HTN, uncontrolled  - Start Amlodipine    Dyslipidemia  - Statin    CAD  - Resumed on ASA, Statin  - ?Resume Plavix (Neurology awaited)    Hypothyroidism  - Continue Synthroid  - Check TSH    Hypokalemia  - replace K    Prophylactic measure  - LMWH for VTEp    Advanced Directive - Full code for now    Disposition - Work up in progress; likely home with service in next 24-48 hours      Discussed with patient, daughter, RN
Pt is 71 year old female with PMHx HTN, HLD, CAD s/p CABG/ PCI Carotid stenosis, Hypothyroidism, presented with right sided paresthesiae and paresis, admitted to MICU with suspect CVA,  required tPA with improvement in symptoms and resolution within 24 hours. Repeat CTH negative for ICH. CTA neck with bilateral JUAN J 60%, MRI with no acute finding. Has been seen in consultation with neurology.     >TIA:  - Sx resolved, no neuro deficit. S/p tPA.    - MRI with no acute finding. TTE reviewed.   - LDL 86 A1c 5.4  - c/w neuro check.   - C/w ASA/ Statins.    >HTN- Stable, C/w Norvasc, Monitor BP and titrate meds.   >HLD- Statins.  >CAD- C/w ASA, Statin. Plavix on hold, will resume if OK with neurology.   >Hypothyroidism- TSH 0.19, check TFT. C/w LT4.   >DVT ppx- Lovenox.
The patient is a 74y Female with new right sided symptoms who received t-PA in the ER.     Stroke.   status post t-PA.   Can start ASA 81 mg daily  Mobilize with physical therapy.     Hypertension   Control blood pressure.     Lipids  check fasting lipid panel LDL=86  goal LDL is under 70  continue crestor 20 mg daily     will follow with you    Fabrizio Rojas MD PhD   [id number]
73 y/o female with PMH of CABG, CAD s/p stents, HLD, Carotid stenosis, hypothyroidism, prior TIAs presents to [facility] this morning for numbness and tinging on her right side (face, hand, and lower leg).      NEURO: CVA s/p TPA. Restarted Aspirin and plavix  CV: Resume BP meds with norvasc. Keep SBP less then 160  RESP: history of 2 PPD x 30 year smoking history. No active issues   RENAL: No issues  GI: Seen by SLP. Recommend  dysphagia diet followed by Dr. Steen GI outpatient.  Aspirations precautions. Cont lansoprazole.  ENDO: Keep sugars less than 180  ID: No issues   HEME: Lovenox for DVT prophylaxis   Dispo: Pt is stable for transfer from ICU. Sign out given Dr. Mendes. Case discussed with Dr. Hutson.

## 2019-09-12 NOTE — PROGRESS NOTE ADULT - SUBJECTIVE AND OBJECTIVE BOX
CHEYENNE JOSEPH Female 74y [id number]    Patient is a 74y old  Female who presents with a chief complaint of stroke (11 Sep 2019 14:06)      On Service note:  Pt seen/ examined at bedside before 12 PM, no over night event reported by night staff. Pt is sitting in chair, Offers no complaints, no motor weakness/ speech normal.     Review of system:  No fever, chills, nausea, vomiting, headache, dizziness, chest pain.       PHYSICAL EXAM:    Vital Signs Last 24 Hrs  T(C): 36.6 (12 Sep 2019 12:51), Max: 36.8 (12 Sep 2019 07:42)  T(F): 97.9 (12 Sep 2019 12:51), Max: 98.2 (12 Sep 2019 07:42)  HR: 64 (12 Sep 2019 12:51) (50 - 67)  BP: 110/62 (12 Sep 2019 12:51) (110/62 - 157/74)  BP(mean): 64 (12 Sep 2019 12:51) (64 - 106)  RR: 14 (12 Sep 2019 12:51) (14 - 137)  SpO2: 99% (12 Sep 2019 12:45) (94% - 99%)    GENERAL: Pt lying comfortably, NAD.  CHEST/LUNG: Clear to auscultation bilaterally; No wheezing.  HEART: S1S2+, Regular rate and rhythm; No murmurs.  ABDOMEN: Soft, Nontender, Nondistended; Bowel sounds present.  Extremities: No LE edema, pulses+  NEURO: AAOX3, no focal deficits, no motor r sensory loss.  PSYCH: normal mood.    MEDICATIONS  (STANDING):  amLODIPine   Tablet 5 milliGRAM(s) Oral daily  aspirin enteric coated 81 milliGRAM(s) Oral daily  atorvastatin 40 milliGRAM(s) Oral at bedtime  enoxaparin Injectable 40 milliGRAM(s) SubCutaneous daily  influenza   Vaccine 0.5 milliLiter(s) IntraMuscular once  lansoprazole Capsule 30 milliGRAM(s) Oral before breakfast  levothyroxine 125 MICROGram(s) Oral daily    MEDICATIONS  (PRN):  acetaminophen   Tablet .. 650 milliGRAM(s) Oral every 6 hours PRN Mild Pain (1 - 3)        Labs:  LABS:                        13.7   4.73  )-----------( 208      ( 11 Sep 2019 03:49 )             40.4     09-12    137  |  104  |  17.0  ----------------------------<  89  4.2   |  22.0  |  0.47<L>    Ca    9.6      12 Sep 2019 05:48  Phos  3.2     09-11  Mg     2.0     09-11

## 2019-09-13 ENCOUNTER — TRANSCRIPTION ENCOUNTER (OUTPATIENT)
Age: 74
End: 2019-09-13

## 2019-09-13 VITALS
RESPIRATION RATE: 17 BRPM | OXYGEN SATURATION: 96 % | HEART RATE: 62 BPM | DIASTOLIC BLOOD PRESSURE: 66 MMHG | SYSTOLIC BLOOD PRESSURE: 132 MMHG | TEMPERATURE: 98 F

## 2019-09-13 LAB
T3 SERPL-MCNC: 88 NG/DL — SIGNIFICANT CHANGE UP (ref 80–200)
T4 AB SER-ACNC: 9.3 UG/DL — SIGNIFICANT CHANGE UP (ref 4.5–12)
TSH SERPL-MCNC: 0.29 UIU/ML — SIGNIFICANT CHANGE UP (ref 0.27–4.2)

## 2019-09-13 PROCEDURE — 99221 1ST HOSP IP/OBS SF/LOW 40: CPT

## 2019-09-13 PROCEDURE — 99232 SBSQ HOSP IP/OBS MODERATE 35: CPT

## 2019-09-13 PROCEDURE — 99238 HOSP IP/OBS DSCHRG MGMT 30/<: CPT

## 2019-09-13 RX ORDER — ATORVASTATIN CALCIUM 80 MG/1
1 TABLET, FILM COATED ORAL
Qty: 30 | Refills: 0
Start: 2019-09-13 | End: 2019-10-12

## 2019-09-13 RX ORDER — ACETAMINOPHEN 500 MG
2 TABLET ORAL
Qty: 0 | Refills: 0 | DISCHARGE
Start: 2019-09-13

## 2019-09-13 RX ORDER — ATORVASTATIN CALCIUM 80 MG/1
0.5 TABLET, FILM COATED ORAL
Qty: 0 | Refills: 0 | DISCHARGE

## 2019-09-13 RX ORDER — AMLODIPINE BESYLATE 2.5 MG/1
1 TABLET ORAL
Qty: 0 | Refills: 0 | DISCHARGE

## 2019-09-13 RX ORDER — AMLODIPINE BESYLATE 2.5 MG/1
1 TABLET ORAL
Qty: 30 | Refills: 0
Start: 2019-09-13 | End: 2019-10-12

## 2019-09-13 RX ADMIN — Medication 81 MILLIGRAM(S): at 08:46

## 2019-09-13 RX ADMIN — AMLODIPINE BESYLATE 5 MILLIGRAM(S): 2.5 TABLET ORAL at 06:14

## 2019-09-13 RX ADMIN — Medication 125 MICROGRAM(S): at 06:15

## 2019-09-13 RX ADMIN — LANSOPRAZOLE 30 MILLIGRAM(S): 15 CAPSULE, DELAYED RELEASE ORAL at 08:46

## 2019-09-13 NOTE — DISCHARGE NOTE PROVIDER - NSDCCPCAREPLAN_GEN_ALL_CORE_FT
PRINCIPAL DISCHARGE DIAGNOSIS  Diagnosis: Cerebrovascular accident (CVA), unspecified mechanism  Assessment and Plan of Treatment: TIA. Symptoms resolved. No Focal deficit.   Take meds as reconciled.   F/u with neurology in clinic.      SECONDARY DISCHARGE DIAGNOSES  Diagnosis: Hypothyroidism  Assessment and Plan of Treatment: C/w home meds  F/u with PMD in 1 week.    Diagnosis: CAD in native artery  Assessment and Plan of Treatment: C/w home meds  F/u with PMD in 1 week.    Diagnosis: HLD (hyperlipidemia)  Assessment and Plan of Treatment: C/w home meds  F/u with PMD in 1 week.    Diagnosis: HTN (hypertension)  Assessment and Plan of Treatment: C/w home meds  F/u with PMD in 1 week. PRINCIPAL DISCHARGE DIAGNOSIS  Diagnosis: Cerebrovascular accident (CVA), unspecified mechanism  Assessment and Plan of Treatment: TIA. Symptoms resolved. No Focal deficit.   Take meds as reconciled.   F/u with neurology in clinic.      SECONDARY DISCHARGE DIAGNOSES  Diagnosis: H/O carotid stenosis  Assessment and Plan of Treatment: Follow up wiht your neurovascular surgeon.   C/w home ASA/ Plavix/ Statins    Diagnosis: Hypothyroidism  Assessment and Plan of Treatment: C/w home meds  F/u with PMD in 1 week.    Diagnosis: CAD in native artery  Assessment and Plan of Treatment: C/w home meds  F/u with PMD in 1 week.    Diagnosis: HLD (hyperlipidemia)  Assessment and Plan of Treatment: C/w home meds  F/u with PMD in 1 week.    Diagnosis: HTN (hypertension)  Assessment and Plan of Treatment: C/w home meds  F/u with PMD in 1 week.

## 2019-09-13 NOTE — CONSULT NOTE ADULT - SUBJECTIVE AND OBJECTIVE BOX
Vascular Attending:  Dr Ruddy Gibbs      HPI:  75 y/o female with PMH of CABG, CAD s/p stents, HLD, Carotid stenosis, hypothyroidism, prior TIAs presents to [facility] this morning for numbness and tinging on her right side (face, hand, and lower leg). Started around 830-900 and progressively got worse, which prompted her to present to [facility]. On arrival to [facility], code stoke called, CT head was negative and she received tPA @ 12:35. CTA with no vascular abnormalities, however evidence of 60% carotid stenosis b/l. Patient seen and examined at the bedside. She is sitting up in bed. Complains of numbness in her hand, right sided face, and right lower leg. She states it seems to be getting a bit better post tPA administration. (10 Sep 2019 16:18)  Pt reports h/o vertibral and basilar stenosis with known prior CVA without residual deficits as well as carotid stenosis with known 50-69% stenosis bilat. She follows with vascular surgery and neurointerventional Dr Link at [facility]    PAST MEDICAL & SURGICAL HISTORY:  PAD (peripheral artery disease)  MVP (mitral valve prolapse)  Aortic stenosis  Pancreatitis: 2011  Fibromyalgia  Hypothyroid  TIA (transient ischemic attack)  COPD (chronic obstructive pulmonary disease)  Hyperlipidemia  IBS (irritable bowel syndrome)  CAD (coronary artery disease)  Chest pain  H/O appendicitis  H/O cholecystitis  Coronary stent patent: 2016 RCA  S/P CABG x 3: 2015      REVIEW OF SYSTEMS  General: denies fever, chills, malaise  Skin/Breast: denies	  Ophthalmologic: denies	  ENMT:	denies  Respiratory and Thorax: denies	  Cardiovascular:	denies  Gastrointestinal:	denies  Genitourinary:	denies  Musculoskeletal:	 denies  Neurological:	denies any headaches, weakness, lightheadedness, dizziness, speech or visual deficits. Reports R sided numbness is resolved  Psychiatric: Anxiety  Hematology/Lymphatics:	 denies  Endocrine: denies  Allergic/Immunologic: denies	    MEDICATIONS  (STANDING):  amLODIPine   Tablet 5 milliGRAM(s) Oral daily  aspirin enteric coated 81 milliGRAM(s) Oral daily  atorvastatin 40 milliGRAM(s) Oral at bedtime  enoxaparin Injectable 40 milliGRAM(s) SubCutaneous daily  lansoprazole Capsule 30 milliGRAM(s) Oral two times a day  levothyroxine 125 MICROGram(s) Oral daily    MEDICATIONS  (PRN):  acetaminophen   Tablet .. 650 milliGRAM(s) Oral every 6 hours PRN Mild Pain (1 - 3)      Allergies  Bactrim (Faint)  chocolate (Other)  Cipro (Rash)  Flagyl (Faint)  Macrobid (Faint)  sulfa drugs (Faint)    Intolerances  codeine (Nausea)  pantoprazole (Vomiting)  reg gelatein TID (Unknown)      Vital Signs Last 24 Hrs  T(C): 36.7 (13 Sep 2019 09:03), Max: 36.7 (12 Sep 2019 16:18)  T(F): 98 (13 Sep 2019 09:03), Max: 98 (12 Sep 2019 16:18)  HR: 62 (13 Sep 2019 09:03) (52 - 73)  BP: 132/66 (13 Sep 2019 09:03) (101/53 - 144/71)  BP(mean): --  RR: 17 (13 Sep 2019 09:03) (16 - 17)  SpO2: 96% (13 Sep 2019 09:03) (96% - 99%)    PHYSICAL EXAM:  Constitutional: WD, WN F in NAD  ENMT: WNL  Neck: No JVD or carotid bruits  Respiratory: CTA  Cardiovascular: normal S1, S2  Neurological: A&O x 3. Speech fluent. CN II-IX intact. BERG X 4 =      LABS:    09-12    137  |  104  |  17.0  ----------------------------<  89  4.2   |  22.0  |  0.47<L>    Ca    9.6      12 Sep 2019 05:48      RADIOLOGY & ADDITIONAL STUDIES  < from: CT Head No Cont (09.11.19 @ 12:48) >   EXAM:  CT BRAIN                          PROCEDURE DATE:  09/11/2019          INTERPRETATION:  CLINICAL Indications:  s/p TPA    COMPARISON: CT head dated 9/10/2019    TECHNIQUE: Noncontrast CT of the head. Multiplanar reformations are   submitted.    FINDINGS: Chronic left caudate head lacunar infarct.  There is periventricular and subcortical white matter hypodensity without   mass effect, nonspecific, likely representing mild-to-moderate chronic   microvascular ischemic changes. There is nocompelling evidence for an   acute transcortical infarction. There is no evidence of mass, mass   effect, midline shift or extra-axial fluid collection. The lateral   ventricles and cortical sulci are age-appropriate in size and   configuration. 1 cmright sphenoid sinus mucous retention cyst. The   orbits, mastoid air cells and visualized paranasal sinuses are otherwise   unremarkable. The calvarium is intact.    IMPRESSION:  Mild to moderate chronic microvascular changes without   evidence of an acute transcortical infarction or hemorrhage. MR is a more   sensitive imaging modality for the evaluation of an acute infarction.       < end of copied text >    < from: CT Angio Neck w/ IV Cont (09.10.19 @ 14:12) >     EXAM:  CT ANGIO NECK (W)AW IC                         EXAM:  CT ANGIO BRAIN (W)AW IC                          PROCEDURE DATE:  09/10/2019          INTERPRETATION:  EXAMS:  1.  CT ANGIOGRAPHY NECK WITH INTRAVENOUS CONTRAST.  2.  CT ANGIOGRAPHY BRAIN WITH INTRAVENOUS CONTRAST.    CLINICAL HISTORY: R-sided numbness/weakness. . .     TECHNIQUE: Contrast enhanced axial CT images were acquired from the   aortic arch to the vertex of the calvarium, during the angiographic   phase.  Three-dimensional maximum intensity projection reformats were   generated.  90 ml of Omnipaque-350 mg/ml were administered intravenously,   without immediate complication.    COMPARISON STUDY: CT head 9/10/2019. MRI brain 7/1/2019. CT neck   11/27/2018.    FINDINGS:     CT ANGIOGRAPHY NECK:     Thoracic aorta and branch vessels: Calcified plaque. Incidental direct   origin of the left vertebral artery from the aortic arch.    Right carotid system: Calcified plaque of the proximal right cervical ICA   results in approximately 60% stenosis by NASCET criteria.    Left carotid system: Calcified plaque at the proximal left cervical ICA   results in approximately 60% stenosis by NASCET criteria.    Vertebral arteries: Moderate to severe stenosis of the left V3 segment.   Dominant right vertebral artery.    Soft tissues of the neck: Right sphenoid and left maxillary sinus polyps   or retention cysts.    Visualized spine: Degenerative changes in spine.    Visualized upper chest: Emphysema.    CT ANGIOGRAPHY BRAIN:    Internal carotid arteries: Patent.     Anterior cerebral arteries: Patent.     Middle cerebral arteries: Patent.      Posterior cerebral arteries: Patent.     Vertebrobasilar: Patent. Mild narrowing of the left intradural vertebral   artery, which terminates predominantly in the left PICA. Dominant right   intradural vertebral artery. Mild stenoses of the distal right intradural   vertebral and mid basilar arteries. Branch vasculature of the posterior   circulation is within normal limits.     Vascular lesions: No evidence of intracranial aneurysm or large vascular   malformation, within limits of CT technique    IMPRESSION:   CT angiography neck: Approximately 60% stenosis bilateral proximal   cervical ICAs by NASCET criteria.   Moderate to severe stenosis of the left V3 segment.    < end of copied text >  Impression and Plan: 75 y/o F with complex medical history who presents with TIA and received tPa with improvement in symptoms  Has known stable carotid disease with vertebral and basilar stenosis. She follows with Dr Link at [facility]  No vascular surgical intervention indicated at this time. Would resume antiplatelet medications if ok with neurology  Follow up [facility] and neuro at [facility] as per pts wishes  Discussed with Dr Ruddy Gibbs

## 2019-09-13 NOTE — DISCHARGE NOTE PROVIDER - CARE PROVIDER_API CALL
Fabrizio Rojas; PhD)  Neurology; Vascular Neurology  [address]  [place] [address]  Phone: [phone]  Fax: [phone]  Follow Up Time:     PMD,   Phone: (   )    -  Fax: (   )    -  Follow Up Time: Fabrizio Rojas; PhD)  Neurology; Vascular Neurology  [address] [facility] [address]  [place] [address]  Phone: [phone]  Fax: [phone]  Follow Up Time:     PMD,   Phone: (   )    -  Fax: (   )    -  Follow Up Time:     Neurovascular,   Phone: (   )    -  Fax: (   )    -  Follow Up Time:

## 2019-09-13 NOTE — DISCHARGE NOTE PROVIDER - HOSPITAL COURSE
Pt is 71 year old female with PMHx HTN, HLD, CAD s/p CABG/ PCI Carotid stenosis, Hypothyroidism, presented with right sided paresthesiae and paresis, admitted to MICU with suspect CVA,  required tPA with improvement in symptoms and resolution within 24 hours. Repeat CTH negative for ICH. CTA neck with bilateral JUAN J 60%, MRI with no acute finding. Has been seen in consultation with neurology and vascular surgery, deemed cleared with resumption of home ASA/ Plavix and statins. Pt is stable for discharge home today.         PHYSICAL EXAM:        Vital Signs Last 24 Hrs    T(C): 36.6 (13 Sep 2019 06:13), Max: 36.7 (12 Sep 2019 16:18)    T(F): 97.8 (13 Sep 2019 06:13), Max: 98 (12 Sep 2019 16:18)    HR: 52 (13 Sep 2019 06:13) (52 - 73)    BP: 130/68 (13 Sep 2019 06:13) (101/53 - 144/71)    BP(mean): 64 (12 Sep 2019 12:51) (64 - 98)    RR: 16 (13 Sep 2019 06:13) (14 - 25)    SpO2: 96% (13 Sep 2019 06:13) (96% - 99%)        GENERAL: Pt lying comfortably, NAD.    CHEST/LUNG: Clear to auscultation bilaterally; No wheezing.    HEART: S1S2+, Regular rate and rhythm; No murmurs.    ABDOMEN: Soft, Nontender, Nondistended; Bowel sounds present.    Extremities: No LE edema, pulses+    NEURO: AAOX3, no focal deficits, no motor r sensory loss.    PSYCH: normal mood.

## 2019-09-13 NOTE — DISCHARGE NOTE PROVIDER - PROVIDER TOKENS
PROVIDER:[TOKEN:[[id number]]],FREE:[LAST:[PMD],PHONE:[(   )    -],FAX:[(   )    -]] PROVIDER:[TOKEN:[[id number]]],FREE:[LAST:[PMD],PHONE:[(   )    -],FAX:[(   )    -]],FREE:[LAST:[Neurovascular],PHONE:[(   )    -],FAX:[(   )    -]]

## 2019-09-13 NOTE — DISCHARGE NOTE PROVIDER - NSDCFUADDAPPT_GEN_ALL_CORE_FT
PT WILL MAKE OWN F/U APT W DR SADIA CARTER   PT DECLINES HAVING ANY ISSUES W MEDICATION MANAGEMENT   PHARMACY : [facility] [place] ON [facility]

## 2019-09-13 NOTE — DISCHARGE NOTE PROVIDER - CARE PROVIDERS DIRECT ADDRESSES
,[email or web address],DirectAddress_Unknown ,[email or web address],DirectAddress_Unknown,DirectAddress_Unknown

## 2019-09-20 ENCOUNTER — APPOINTMENT (OUTPATIENT)
Dept: GASTROENTEROLOGY | Facility: CLINIC | Age: 74
End: 2019-09-20
Payer: MEDICARE

## 2019-09-20 VITALS — HEART RATE: 72 BPM | SYSTOLIC BLOOD PRESSURE: 146 MMHG | DIASTOLIC BLOOD PRESSURE: 71 MMHG

## 2019-09-20 DIAGNOSIS — R13.10 DYSPHAGIA, UNSPECIFIED: ICD-10-CM

## 2019-09-20 DIAGNOSIS — R09.89 OTHER SPECIFIED SYMPTOMS AND SIGNS INVOLVING THE CIRCULATORY AND RESPIRATORY SYSTEMS: ICD-10-CM

## 2019-09-20 DIAGNOSIS — K22.4 DYSKINESIA OF ESOPHAGUS: ICD-10-CM

## 2019-09-20 PROCEDURE — 99214 OFFICE O/P EST MOD 30 MIN: CPT

## 2019-09-20 NOTE — ASSESSMENT
[FreeTextEntry1] : Advised patient to continue with SLP therapy, PPI, and to consider PEG tube placement if she is not able to maintain her nutrition.  She will follow up with me as needed and call the office with any questions or concerns.\par

## 2019-09-20 NOTE — PHYSICAL EXAM
[General Appearance - Alert] : alert [General Appearance - In No Acute Distress] : in no acute distress [Sclera] : the sclera and conjunctiva were normal [PERRL With Normal Accommodation] : pupils were equal in size, round, and reactive to light [Extraocular Movements] : extraocular movements were intact [Outer Ear] : the ears and nose were normal in appearance [Oropharynx] : the oropharynx was normal [Neck Appearance] : the appearance of the neck was normal [Neck Cervical Mass (___cm)] : no neck mass was observed [Jugular Venous Distention Increased] : there was no jugular-venous distention [Thyroid Diffuse Enlargement] : the thyroid was not enlarged [Thyroid Nodule] : there were no palpable thyroid nodules [Auscultation Breath Sounds / Voice Sounds] : lungs were clear to auscultation bilaterally [Heart Rate And Rhythm] : heart rate was normal and rhythm regular [Heart Sounds] : normal S1 and S2 [Heart Sounds Gallop] : no gallops [Murmurs] : no murmurs [Heart Sounds Pericardial Friction Rub] : no pericardial rub [Edema] : there was no peripheral edema [Abdomen Soft] : soft [Bowel Sounds] : normal bowel sounds [] : no hepato-splenomegaly [Abdomen Tenderness] : non-tender [Cervical Lymph Nodes Enlarged Posterior Bilaterally] : posterior cervical [Abdomen Mass (___ Cm)] : no abdominal mass palpated [Cervical Lymph Nodes Enlarged Anterior Bilaterally] : anterior cervical [Supraclavicular Lymph Nodes Enlarged Bilaterally] : supraclavicular [Skin Color & Pigmentation] : normal skin color and pigmentation [Nail Clubbing] : no clubbing  or cyanosis of the fingernails [Skin Turgor] : normal skin turgor [No Focal Deficits] : no focal deficits [Impaired Insight] : insight and judgment were intact [Oriented To Time, Place, And Person] : oriented to person, place, and time [Affect] : the affect was normal

## 2019-09-20 NOTE — HISTORY OF PRESENT ILLNESS
[de-identified] : Patient returns for follow up for dysphagia.  Since her last meeting me with she has undergone an extensive workup with Dr. 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  She also reports having had a CVA last week and received tPA without issue.  She has seen SLP and learned some exercises to do to help improve her symptoms.  He symptoms are stable to a little worse.  We discussed PEG tube placement extensively, which she said she will consider but is not yet willing to go that route.  She reports a recent 4-lb weight loss after her CVA due to decreased appetite.

## 2019-10-02 PROCEDURE — 86900 BLOOD TYPING SEROLOGIC ABO: CPT

## 2019-10-02 PROCEDURE — 83036 HEMOGLOBIN GLYCOSYLATED A1C: CPT

## 2019-10-02 PROCEDURE — 97167 OT EVAL HIGH COMPLEX 60 MIN: CPT

## 2019-10-02 PROCEDURE — 96375 TX/PRO/DX INJ NEW DRUG ADDON: CPT | Mod: XU

## 2019-10-02 PROCEDURE — 97163 PT EVAL HIGH COMPLEX 45 MIN: CPT

## 2019-10-02 PROCEDURE — 84443 ASSAY THYROID STIM HORMONE: CPT

## 2019-10-02 PROCEDURE — 83735 ASSAY OF MAGNESIUM: CPT

## 2019-10-02 PROCEDURE — 96374 THER/PROPH/DIAG INJ IV PUSH: CPT | Mod: XU

## 2019-10-02 PROCEDURE — 85610 PROTHROMBIN TIME: CPT

## 2019-10-02 PROCEDURE — 92523 SPEECH SOUND LANG COMPREHEN: CPT

## 2019-10-02 PROCEDURE — 80048 BASIC METABOLIC PNL TOTAL CA: CPT

## 2019-10-02 PROCEDURE — 70496 CT ANGIOGRAPHY HEAD: CPT

## 2019-10-02 PROCEDURE — 84100 ASSAY OF PHOSPHORUS: CPT

## 2019-10-02 PROCEDURE — 84484 ASSAY OF TROPONIN QUANT: CPT

## 2019-10-02 PROCEDURE — 70498 CT ANGIOGRAPHY NECK: CPT

## 2019-10-02 PROCEDURE — 92610 EVALUATE SWALLOWING FUNCTION: CPT

## 2019-10-02 PROCEDURE — 99291 CRITICAL CARE FIRST HOUR: CPT | Mod: 25

## 2019-10-02 PROCEDURE — 70551 MRI BRAIN STEM W/O DYE: CPT

## 2019-10-02 PROCEDURE — 86850 RBC ANTIBODY SCREEN: CPT

## 2019-10-02 PROCEDURE — 84480 ASSAY TRIIODOTHYRONINE (T3): CPT

## 2019-10-02 PROCEDURE — 93005 ELECTROCARDIOGRAM TRACING: CPT

## 2019-10-02 PROCEDURE — 86803 HEPATITIS C AB TEST: CPT

## 2019-10-02 PROCEDURE — 97530 THERAPEUTIC ACTIVITIES: CPT

## 2019-10-02 PROCEDURE — 85027 COMPLETE CBC AUTOMATED: CPT

## 2019-10-02 PROCEDURE — 97112 NEUROMUSCULAR REEDUCATION: CPT

## 2019-10-02 PROCEDURE — 80053 COMPREHEN METABOLIC PANEL: CPT

## 2019-10-02 PROCEDURE — 80061 LIPID PANEL: CPT

## 2019-10-02 PROCEDURE — 85730 THROMBOPLASTIN TIME PARTIAL: CPT

## 2019-10-02 PROCEDURE — 93880 EXTRACRANIAL BILAT STUDY: CPT

## 2019-10-02 PROCEDURE — 84436 ASSAY OF TOTAL THYROXINE: CPT

## 2019-10-02 PROCEDURE — 86901 BLOOD TYPING SEROLOGIC RH(D): CPT

## 2019-10-02 PROCEDURE — 93306 TTE W/DOPPLER COMPLETE: CPT

## 2019-10-02 PROCEDURE — 36415 COLL VENOUS BLD VENIPUNCTURE: CPT

## 2019-10-02 PROCEDURE — 97116 GAIT TRAINING THERAPY: CPT

## 2019-10-02 PROCEDURE — 70450 CT HEAD/BRAIN W/O DYE: CPT

## 2019-10-02 PROCEDURE — 82962 GLUCOSE BLOOD TEST: CPT

## 2019-10-07 ENCOUNTER — APPOINTMENT (OUTPATIENT)
Dept: NEUROSURGERY | Facility: CLINIC | Age: 74
End: 2019-10-07
Payer: MEDICARE

## 2019-10-07 VITALS
SYSTOLIC BLOOD PRESSURE: 143 MMHG | OXYGEN SATURATION: 98 % | WEIGHT: 113 LBS | BODY MASS INDEX: 18.83 KG/M2 | HEIGHT: 65 IN | HEART RATE: 72 BPM | DIASTOLIC BLOOD PRESSURE: 65 MMHG | TEMPERATURE: 98.5 F | RESPIRATION RATE: 18 BRPM

## 2019-10-07 DIAGNOSIS — I65.09 OCCLUSION AND STENOSIS OF UNSPECIFIED VERTEBRAL ARTERY: ICD-10-CM

## 2019-10-07 PROCEDURE — 99215 OFFICE O/P EST HI 40 MIN: CPT

## 2019-10-11 ENCOUNTER — OUTPATIENT (OUTPATIENT)
Dept: OUTPATIENT SERVICES | Facility: HOSPITAL | Age: 74
LOS: 1 days | End: 2019-10-11
Payer: MEDICARE

## 2019-10-11 VITALS
WEIGHT: 117.07 LBS | SYSTOLIC BLOOD PRESSURE: 146 MMHG | HEART RATE: 66 BPM | TEMPERATURE: 98 F | OXYGEN SATURATION: 99 % | HEIGHT: 64 IN | DIASTOLIC BLOOD PRESSURE: 75 MMHG | RESPIRATION RATE: 18 BRPM

## 2019-10-11 DIAGNOSIS — Z87.19 PERSONAL HISTORY OF OTHER DISEASES OF THE DIGESTIVE SYSTEM: Chronic | ICD-10-CM

## 2019-10-11 DIAGNOSIS — Z98.890 OTHER SPECIFIED POSTPROCEDURAL STATES: Chronic | ICD-10-CM

## 2019-10-11 DIAGNOSIS — Z95.5 PRESENCE OF CORONARY ANGIOPLASTY IMPLANT AND GRAFT: Chronic | ICD-10-CM

## 2019-10-11 DIAGNOSIS — Z90.49 ACQUIRED ABSENCE OF OTHER SPECIFIED PARTS OF DIGESTIVE TRACT: Chronic | ICD-10-CM

## 2019-10-11 DIAGNOSIS — I65.29 OCCLUSION AND STENOSIS OF UNSPECIFIED CAROTID ARTERY: ICD-10-CM

## 2019-10-11 DIAGNOSIS — Z01.818 ENCOUNTER FOR OTHER PREPROCEDURAL EXAMINATION: ICD-10-CM

## 2019-10-11 DIAGNOSIS — Z95.1 PRESENCE OF AORTOCORONARY BYPASS GRAFT: Chronic | ICD-10-CM

## 2019-10-11 DIAGNOSIS — I65.22 OCCLUSION AND STENOSIS OF LEFT CAROTID ARTERY: ICD-10-CM

## 2019-10-11 LAB
ANION GAP SERPL CALC-SCNC: 12 MMOL/L — SIGNIFICANT CHANGE UP (ref 5–17)
BLD GP AB SCN SERPL QL: NEGATIVE — SIGNIFICANT CHANGE UP
BUN SERPL-MCNC: 12 MG/DL — SIGNIFICANT CHANGE UP (ref 7–23)
CALCIUM SERPL-MCNC: 9.6 MG/DL — SIGNIFICANT CHANGE UP (ref 8.4–10.5)
CHLORIDE SERPL-SCNC: 105 MMOL/L — SIGNIFICANT CHANGE UP (ref 96–108)
CO2 SERPL-SCNC: 23 MMOL/L — SIGNIFICANT CHANGE UP (ref 22–31)
CREAT SERPL-MCNC: 0.46 MG/DL — LOW (ref 0.5–1.3)
GLUCOSE SERPL-MCNC: 97 MG/DL — SIGNIFICANT CHANGE UP (ref 70–99)
HCT VFR BLD CALC: 40.6 % — SIGNIFICANT CHANGE UP (ref 34.5–45)
HGB BLD-MCNC: 13.6 G/DL — SIGNIFICANT CHANGE UP (ref 11.5–15.5)
MCHC RBC-ENTMCNC: 31.3 PG — SIGNIFICANT CHANGE UP (ref 27–34)
MCHC RBC-ENTMCNC: 33.5 GM/DL — SIGNIFICANT CHANGE UP (ref 32–36)
MCV RBC AUTO: 93.5 FL — SIGNIFICANT CHANGE UP (ref 80–100)
PLATELET # BLD AUTO: 245 K/UL — SIGNIFICANT CHANGE UP (ref 150–400)
POTASSIUM SERPL-MCNC: 3.9 MMOL/L — SIGNIFICANT CHANGE UP (ref 3.5–5.3)
POTASSIUM SERPL-SCNC: 3.9 MMOL/L — SIGNIFICANT CHANGE UP (ref 3.5–5.3)
RBC # BLD: 4.34 M/UL — SIGNIFICANT CHANGE UP (ref 3.8–5.2)
RBC # FLD: 12.7 % — SIGNIFICANT CHANGE UP (ref 10.3–14.5)
RH IG SCN BLD-IMP: POSITIVE — SIGNIFICANT CHANGE UP
SODIUM SERPL-SCNC: 140 MMOL/L — SIGNIFICANT CHANGE UP (ref 135–145)
WBC # BLD: 6.05 K/UL — SIGNIFICANT CHANGE UP (ref 3.8–10.5)
WBC # FLD AUTO: 6.05 K/UL — SIGNIFICANT CHANGE UP (ref 3.8–10.5)

## 2019-10-11 PROCEDURE — 86900 BLOOD TYPING SEROLOGIC ABO: CPT

## 2019-10-11 PROCEDURE — 85027 COMPLETE CBC AUTOMATED: CPT

## 2019-10-11 PROCEDURE — 80048 BASIC METABOLIC PNL TOTAL CA: CPT

## 2019-10-11 PROCEDURE — G0463: CPT

## 2019-10-11 PROCEDURE — 86901 BLOOD TYPING SEROLOGIC RH(D): CPT

## 2019-10-11 PROCEDURE — 86850 RBC ANTIBODY SCREEN: CPT

## 2019-10-11 RX ORDER — LANSOPRAZOLE 15 MG/1
1 CAPSULE, DELAYED RELEASE ORAL
Qty: 0 | Refills: 0 | DISCHARGE

## 2019-10-11 RX ORDER — FLUTICASONE PROPIONATE 50 MCG
1 SPRAY, SUSPENSION NASAL
Qty: 0 | Refills: 0 | DISCHARGE

## 2019-10-11 RX ORDER — THIAMINE MONONITRATE (VIT B1) 100 MG
1 TABLET ORAL
Qty: 0 | Refills: 0 | DISCHARGE

## 2019-10-11 RX ORDER — PREGABALIN 225 MG/1
1 CAPSULE ORAL
Qty: 0 | Refills: 0 | DISCHARGE

## 2019-10-11 NOTE — H&P PST ADULT - NSANTHOSAYNRD_GEN_A_CORE
No. NERY screening performed.  STOP BANG Legend: 0-2 = LOW Risk; 3-4 = INTERMEDIATE Risk; 5-8 = HIGH Risk

## 2019-10-11 NOTE — H&P PST ADULT - NSICDXPASTSURGICALHX_GEN_ALL_CORE_FT
PAST SURGICAL HISTORY:  Coronary stent patent 2016 RCA    H/O appendicitis     H/O cholecystitis     S/P CABG x 3 2015 PAST SURGICAL HISTORY:  Coronary stent patent 2016- 4 stents ,2018 2 stents total of 6 stents    H/O endoscopy     History of appendectomy     History of cardiac cath     History of cholecystectomy     S/P CABG x 3 2015

## 2019-10-11 NOTE — H&P PST ADULT - HISTORY OF PRESENT ILLNESS
Pt is 71 year old female with PMHx HTN, HLD, CAD s/p CABG/ PCI Carotid stenosis, Hypothyroidism, presented with right sided paresthesiae and paresis, admitted to MICU with suspect CVA,  required tPA with improvement in symptoms and resolution within 24 hours. Repeat CT Pt is 74 year old female with PMHx HTN, HLD, CAD s/p CABG/ PCI Carotid stenosis, Hypothyroidism, presented with right sided paresthesiae and paresis, admitted to MICU with suspect CVA,  required tPA with improvement in symptoms and resolution within 24 hours. Repeat CT Pt is 74 year old female with PMHx HTN, HLD, CAD s/p CABG/ PCI Carotid stenosis, Hypothyroidism, presented with right sided paresthesiae and paresis, admitted to MICU with suspect CVA,  required tPA with improvement in symptoms and resolution within 24 hours. 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 She follows with vascular surgery and neurointerventional Dr Link . s/p recent hospitalization in 9/10- /13/19 at [facility] with s/s of  numbness and tinging on her right side (face, hand, and lower leg).  and progressively got worse, which prompted her to present to [facility].  CT head was negative and she received tPA . CTA with no vascular abnormalities, however evidence of 60% carotid stenosis b/l. symptoms   post tPA administration. (10 Sep 2019 16:18). d/cd home with palvix and aspirin . Presents to PST for scheduled Left Carotid  Angioplasty  and stenting on 10/15/19. 73 y/o female with PMH of CABG ( 2015), CAD s/p stents in 216 and 2018 , HLD,  hypothyroidism. 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 She follows with vascular surgery and neurointerventional Dr Link . S/P recent hospitalization in 9/10- /13/19 at [facility] with s/s of  numbness and tinging on her right side (face, hand, and lower leg).  and progressively got worse, which prompted her to present to [facility].  CT head was negative and she received tPA with improvement in symptoms and resolution within 24 hours. Repeat CTH negative for ICH. CTA neck with bilateral JUAN J 60%, MRI with no acute finding . d/cd home with palvix, aspirin  and statin . Presents to PST for scheduled Left Carotid  Angioplasty  and stenting on 10/15/19.

## 2019-10-11 NOTE — H&P PST ADULT - NSICDXPROBLEM_GEN_ALL_CORE_FT
PROBLEM DIAGNOSES  Problem: Occlusion and stenosis of left carotid artery  Assessment and Plan: Left carotid angioplasty and stenting on 10/15/19  - pre- op instructions discussed   -Labs sent  - pt will continue plavix and aspirin  -pt will continue BP medication

## 2019-10-11 NOTE — H&P PST ADULT - NSICDXPASTMEDICALHX_GEN_ALL_CORE_FT
PAST MEDICAL HISTORY:  Aortic stenosis     CAD (coronary artery disease)     Chest pain     COPD (chronic obstructive pulmonary disease)     Fibromyalgia     Hyperlipidemia     Hypothyroid     IBS (irritable bowel syndrome)     MVP (mitral valve prolapse)     PAD (peripheral artery disease)     Pancreatitis 2011    TIA (transient ischemic attack) PAST MEDICAL HISTORY:  Aortic stenosis     CAD (coronary artery disease)     Chest pain     COPD (chronic obstructive pulmonary disease)     Fibromyalgia     H/O TIA (transient ischemic attack) and stroke     Hyperlipidemia     Hypothyroid     IBS (irritable bowel syndrome)     IBS (irritable bowel syndrome)     MVP (mitral valve prolapse)     PAD (peripheral artery disease)     Pancreatitis 2011    TIA (transient ischemic attack)

## 2019-10-11 NOTE — H&P PST ADULT - NEGATIVE NEUROLOGICAL SYMPTOMS
no focal seizures/no transient paralysis/no paresthesias/no difficulty walking/no syncope/no weakness/no generalized seizures/no loss of consciousness

## 2019-10-15 ENCOUNTER — APPOINTMENT (OUTPATIENT)
Dept: NEUROSURGERY | Facility: HOSPITAL | Age: 74
End: 2019-10-15
Payer: MEDICARE

## 2019-10-15 ENCOUNTER — OUTPATIENT (OUTPATIENT)
Dept: OUTPATIENT SERVICES | Facility: HOSPITAL | Age: 74
LOS: 1 days | End: 2019-10-15
Payer: MEDICARE

## 2019-10-15 VITALS — WEIGHT: 117.07 LBS | HEIGHT: 64 IN

## 2019-10-15 DIAGNOSIS — Z90.49 ACQUIRED ABSENCE OF OTHER SPECIFIED PARTS OF DIGESTIVE TRACT: Chronic | ICD-10-CM

## 2019-10-15 DIAGNOSIS — Z95.5 PRESENCE OF CORONARY ANGIOPLASTY IMPLANT AND GRAFT: Chronic | ICD-10-CM

## 2019-10-15 DIAGNOSIS — Z98.890 OTHER SPECIFIED POSTPROCEDURAL STATES: Chronic | ICD-10-CM

## 2019-10-15 DIAGNOSIS — Z95.1 PRESENCE OF AORTOCORONARY BYPASS GRAFT: Chronic | ICD-10-CM

## 2019-10-15 DIAGNOSIS — I65.29 OCCLUSION AND STENOSIS OF UNSPECIFIED CAROTID ARTERY: ICD-10-CM

## 2019-10-15 LAB
BLD GP AB SCN SERPL QL: NEGATIVE — SIGNIFICANT CHANGE UP
PA ADP PRP-ACNC: 186 PRU — LOW (ref 194–417)
RH IG SCN BLD-IMP: POSITIVE — SIGNIFICANT CHANGE UP

## 2019-10-15 PROCEDURE — 36226 PLACE CATH VERTEBRAL ART: CPT | Mod: 50

## 2019-10-15 PROCEDURE — 86901 BLOOD TYPING SEROLOGIC RH(D): CPT

## 2019-10-15 PROCEDURE — C1887: CPT

## 2019-10-15 PROCEDURE — 85576 BLOOD PLATELET AGGREGATION: CPT

## 2019-10-15 PROCEDURE — 36223 PLACE CATH CAROTID/INOM ART: CPT | Mod: 50

## 2019-10-15 PROCEDURE — C1769: CPT

## 2019-10-15 PROCEDURE — 86900 BLOOD TYPING SEROLOGIC ABO: CPT

## 2019-10-15 PROCEDURE — 36223 PLACE CATH CAROTID/INOM ART: CPT

## 2019-10-15 PROCEDURE — 86850 RBC ANTIBODY SCREEN: CPT

## 2019-10-15 PROCEDURE — C1894: CPT

## 2019-10-15 PROCEDURE — 36226 PLACE CATH VERTEBRAL ART: CPT

## 2019-10-15 RX ORDER — SODIUM CHLORIDE 9 MG/ML
1000 INJECTION INTRAMUSCULAR; INTRAVENOUS; SUBCUTANEOUS
Refills: 0 | Status: DISCONTINUED | OUTPATIENT
Start: 2019-10-15 | End: 2019-11-04

## 2019-10-15 NOTE — CHART NOTE - NSCHARTNOTEFT_GEN_A_CORE
Interventional Neuro Radiology  Pre-Procedure Note BRIAN      HPI:  Radha Hsu is a 74yr old female admitted to [facility] 9/10/2019. PMHx HTN, HLD, CAD s/p CABG/ PCI Carotid stenosis, Hypothyroidism, presented with right sided paresthesiae and paresis, admitted to MICU with suspect CVA, required tPA with improvement in symptoms and resolution within 24 hours. Repeat CTH negative for ICH. Today she is neurologically intact. She is here for cerebral angiography and possible carotid stent for left side symptomatic carotid stenosis .       Allergies: Bactrim (Faint)  Bananas (Angioedema)  chocolate (Other)  Cipro (Rash)  codeine (Nausea)  Flagyl (Faint)  Macrobid (Faint)  pantoprazole (Vomiting)  reg gelatein TID (Unknown)  sulfa drugs (Faint)      PAST MEDICAL & SURGICAL HISTORY:  H/O TIA (transient ischemic attack) and stroke  IBS (irritable bowel syndrome)  PAD (peripheral artery disease)  MVP (mitral valve prolapse)  Aortic stenosis  Pancreatitis: 2011  Fibromyalgia  Hypothyroid  TIA (transient ischemic attack)  COPD (chronic obstructive pulmonary disease)  Hyperlipidemia  IBS (irritable bowel syndrome)  CAD (coronary artery disease)  Chest pain  H/O endoscopy  History of cardiac cath  History of appendectomy  History of cholecystectomy  Coronary stent patent: 2016- 4 stents ,2018 2 stents total of 6 stents  S/P CABG x 3: 2015      Social History: Former Smoker    FAMILY HISTORY:  Family history of heart disease      Current Medications:   Aspirin 325mg daily  Plavix 75mg daily     Labs:   PRU:       Assessment/Plan:   This is a 74y  year old female with left sided symptomatic carotid stenosis.   Patient presents to neuro-IR for selective cerebral angiography and possible left carotid angioplasty and stenting.   Procedure, goals, risks, benefits and alternatives  were discussed with patient and patient's family.  All questions were answered.  Risks include but are not limited to stroke, vessel injury, hemorrhage, and or right  groin hematoma.  Patient demonstrates understanding  of all risks involved with this procedure and wishes to continue.   Appropriate  consent was obtained from patient and consent is in the patient's chart. Interventional Neuro Radiology  Pre-Procedure Note BRIAN      HPI:  Radha Hsu is a 74yr old female admitted to [facility] 9/10/2019. PMHx HTN, HLD, CAD s/p CABG/ PCI Carotid stenosis, Hypothyroidism, presented with right sided paresthesiae and paresis, admitted to MICU with suspect CVA, required tPA with improvement in symptoms and resolution within 24 hours. Repeat CTH negative for ICH. Today she is neurologically intact. She is here for cerebral angiography and possible carotid stent for left side symptomatic carotid stenosis .       Allergies: Bactrim (Faint)  Bananas (Angioedema)  chocolate (Other)  Cipro (Rash)  codeine (Nausea)  Flagyl (Faint)  Macrobid (Faint)  pantoprazole (Vomiting)  reg gelatein TID (Unknown)  sulfa drugs (Faint)      PAST MEDICAL & SURGICAL HISTORY:  H/O TIA (transient ischemic attack) and stroke  IBS (irritable bowel syndrome)  PAD (peripheral artery disease)  MVP (mitral valve prolapse)  Aortic stenosis  Pancreatitis: 2011  Fibromyalgia  Hypothyroid  TIA (transient ischemic attack)  COPD (chronic obstructive pulmonary disease)  Hyperlipidemia  IBS (irritable bowel syndrome)  CAD (coronary artery disease)  Chest pain  H/O endoscopy  History of cardiac cath  History of appendectomy  History of cholecystectomy  Coronary stent patent: 2016- 4 stents ,2018 2 stents total of 6 stents  S/P CABG x 3: 2015      Social History: Former Smoker    FAMILY HISTORY:  Family history of heart disease      Current Medications:   Aspirin 325mg daily  Plavix 75mg daily     Labs:   PRU: 186      Assessment/Plan:   This is a 74y  year old female with left sided symptomatic carotid stenosis.   Patient presents to neuro-IR for selective cerebral angiography and possible left carotid angioplasty and stenting.   Procedure, goals, risks, benefits and alternatives  were discussed with patient and patient's family.  All questions were answered.  Risks include but are not limited to stroke, vessel injury, hemorrhage, and or right  groin hematoma.  Patient demonstrates understanding  of all risks involved with this procedure and wishes to continue.   Appropriate  consent was obtained from patient and consent is in the patient's chart.

## 2019-10-15 NOTE — CHART NOTE - NSCHARTNOTEFT_GEN_A_CORE
Interventional Neuro- Radiology   Procedure Note PA-C    Procedure: Selective Cerebral Angiography   Pre- Procedure Diagnosis: Left Side carotid stenosis symptomatic   Post- Procedure Diagnosis:    : Dr. Daniel Link    Nurse Practitioner: Patti Pollard    RN: April Quiles    Anesthesia: (MAC)   (general anesthesia)    Sheath:    I/Os:  Estimated blood loss less than 10cc  IV fluids:     cc     Urine output     cc        Contrast Omnipaque 240      cc         Antibiotics:    Vitals: BP         HR      Spo2    %       Preliminary Report:    Using a 4 Fr short/long sheath to the right groin under MAC sedation via   left vertebral artery,  left common carotid artery, left external carotid artey, right vertebral arter,  right common carotid artery, right external carotid artery  a selective cerebral angiography was performed and  demonstrates ________. ( Official note to follow).  Patient tolerated procedure well, hemodynamically stable, no change in neurological status compared to baseline.  Results discussed with neurosurgery, patient and patient's  family.  Groin sheath was removed,  manual compression held to hemostasis  for  21 minutes, no active bleeding, no hematoma, avitene applied,  quick clot and safeguard balloon dressing applied at _____h.  Patient transfered to Recovery Room Interventional Neuro- Radiology   Procedure Note PA-C    Procedure: Selective Cerebral Angiography   Pre- Procedure Diagnosis: Left Side carotid stenosis   Post- Procedure Diagnosis: Carotid stenosis less than 50% bilaterally    : Dr. Daniel Link    Nurse Practitioner: Patti Pollard    RN: April Quiles    Anesthesia: Dr. Chris King (MAC)    Sheath: 5 Citizen of [place] 65 arrow    I/Os:  Estimated blood loss: less than 10cc      IV fluids: 200cc     Urine output: 45cc        Contrast Omnipaque 240: 73cc        Vitals: /82        HR 75     Spo2 100%         Preliminary Report:  Using a 5 Fr 65Arrow sheath to the right groin under MAC sedation via  left vertebral artery,  left internal carotid artery, left external carotid artery, right vertebral artery,  right internal carotid artery, right external carotid artery  a selective cerebral angiography was performed and  demonstrates carotid stenosis bilaterally less than 50%. ( Official note to follow).  Patient tolerated procedure well, hemodynamically stable, no change in neurological status compared to baseline.  Results discussed with patient and patient's  family.  Groin sheath was removed,  manual compression held to hemostasis  for  21 minutes, no active bleeding, no hematoma, quick clot and safeguard balloon dressing applied at 1445h.  Patient transferred to Recovery Room

## 2019-10-16 PROBLEM — K58.9 IRRITABLE BOWEL SYNDROME WITHOUT DIARRHEA: Chronic | Status: ACTIVE | Noted: 2019-10-11

## 2019-10-16 PROBLEM — K58.9 IRRITABLE BOWEL SYNDROME, UNSPECIFIED: Chronic | Status: ACTIVE | Noted: 2019-10-11

## 2019-10-16 PROBLEM — Z86.73 PERSONAL HISTORY OF TRANSIENT ISCHEMIC ATTACK (TIA), AND CEREBRAL INFARCTION WITHOUT RESIDUAL DEFICITS: Chronic | Status: ACTIVE | Noted: 2019-10-11

## 2019-10-31 ENCOUNTER — APPOINTMENT (OUTPATIENT)
Dept: NEUROSURGERY | Facility: CLINIC | Age: 74
End: 2019-10-31
Payer: MEDICARE

## 2019-10-31 VITALS
HEIGHT: 65 IN | BODY MASS INDEX: 19.99 KG/M2 | SYSTOLIC BLOOD PRESSURE: 148 MMHG | OXYGEN SATURATION: 97 % | RESPIRATION RATE: 17 BRPM | TEMPERATURE: 97.6 F | HEART RATE: 61 BPM | DIASTOLIC BLOOD PRESSURE: 63 MMHG | WEIGHT: 120 LBS

## 2019-10-31 PROCEDURE — 99214 OFFICE O/P EST MOD 30 MIN: CPT

## 2019-10-31 NOTE — REASON FOR VISIT
[Follow-Up: _____] : a [unfilled] follow-up visit [Family Member] : family member [FreeTextEntry1] : Radha Hsu is a 74yr old female here for a follow up visit after being admitted to [facility] 9/10/2019. PMHx HTN, HLD, CAD s/p CABG/ PCI Carotid stenosis, Hypothyroidism, presented with right sided paresthesiae and paresis, admitted to MICU with suspect CVA,  required tPA with improvement in symptoms and resolution within 24 hours. Repeat CTH negative for ICH. CTA neck with bilateral JUAN J 60%, MRI with no acute finding. Has been seen in consultation with neurology and vascular surgery, deemed cleared with resumption of home ASA/ Plavix and statins. Pt is stable for discharge home 9/13/2019. Since she has been home she denies any repeat stroke like events. She is currently neurologically intact. \par \par Cardiology Dr. Oh Pearce\par

## 2019-10-31 NOTE — ASSESSMENT
[FreeTextEntry1] : Impression: 74yr old female with carotid stenosis and vertebral artery stenosis now s/p diagnostic cerebral angiography 10/16/2019 showing no significant stenosis in either carotids or vertebral artery \par \par Plan:  \par Reviewed the results of the diagnostic cerebral angiogram done 10/15/2019.  This angiogram showed no significant carotid artery stenosis on either side. She did not require angioplasty and stenting portion of the procedure. It also did not show any overt narrowing of the vertebral or basilar artery.\par Repeat carotid doppler ultrasound in one year October 2020 then follow up in the office after. carotid doppler ultrasound 2019 shows LICA PSV 95 and SHAMIKA \par Continue medical management with aspirin and plavix and statin.

## 2019-10-31 NOTE — ASSESSMENT
[FreeTextEntry1] : Impression: 74yr old female with carotid stenosis and vertebral artery stenosis; left carotid stenosis is now asymptomatic as evidenced by her recent TIA \par \par Plan: \par CTA head and neck show LICA stenosis about 60% now symptomatic as evidenced by her TIA symptoms of right side facial right side weakness and right side parathesia which resolved after TPA\par She is already on aspirin and plavix \par Discussed cerebral angiography to definitively evaluate the degree of stenosis and if warranted put a carotid stent in the LICA. The risks, benefits, alternatives, complications and personnel associated with the procedure were discussed with the patient and the family in great detail.  They request that we proceed.\par Cardiac clearance prior to the procedure.

## 2019-10-31 NOTE — REASON FOR VISIT
[Follow-Up: _____] : a [unfilled] follow-up visit [Family Member] : family member [FreeTextEntry1] : Radha Hsu is a 74yr old female here for a follow up visit after having diagnostic cerebral angiogram done 10/15/2019.  This angiogram showed no significant carotid artery stenosis on either side. She did not require angioplasty and stenting portion of the procedure. \par \par HPI: Admitted [facility] 9/10/2019. PMHx HTN, HLD, CAD s/p CABG/ PCI Carotid stenosis, Hypothyroidism, presented with right sided paresthesiae and paresis, admitted to MICU with suspect CVA, required tPA with improvement in symptoms and resolution within 24 hours. Repeat CTH negative for ICH. CTA neck with bilateral JUAN J 60%, MRI with no acute finding. Has been seen in consultation with neurology and vascular surgery, deemed cleared with resumption of home ASA/ Plavix and statins. Pt is stable for discharge home 9/13/2019. Since she has been home she denies any repeat stroke like events. She is currently neurologically intact. \par \par Cardiology Dr. Oh Pearce\par

## 2019-10-31 NOTE — PHYSICAL EXAM
[General Appearance - Alert] : alert [General Appearance - In No Acute Distress] : in no acute distress [Person] : oriented to person [Place] : oriented to place [Time] : oriented to time [Cranial Nerves Oculomotor (III)] : extraocular motion intact [Cranial Nerves Trigeminal (V)] : facial sensation intact symmetrically [Cranial Nerves Facial (VII)] : face symmetrical [Cranial Nerves Vestibulocochlear (VIII)] : hearing was intact bilaterally [Cranial Nerves Glossopharyngeal (IX)] : tongue and palate midline [Cranial Nerves Accessory (XI - Cranial And Spinal)] : head turning and shoulder shrug symmetric [Cranial Nerves Hypoglossal (XII)] : there was no tongue deviation with protrusion [] : no respiratory distress

## 2019-10-31 NOTE — CONSULT LETTER
[Dear  ___] : Dear  [unfilled], [Consult Letter:] : I had the pleasure of evaluating your patient, [unfilled]. [Consult Closing:] : Thank you very much for allowing me to participate in the care of this patient.  If you have any questions, please do not hesitate to contact me. [Sincerely,] : Sincerely, [DrRomel  ___] : Dr. BENOIT [DrRomel ___] : Dr. BENOIT [FreeTextEntry2] : Kurt Munson [FreeTextEntry1] : As you know she is a 73 year old right handed female with a past medical history of psoriatic arthritis, COPD, hypertension, hyperlipidemia s/p CABG in 2015 and more recently drug eluting coronary stent placement.  She is currently on dual anti-platelet therapy and her PRU is 175.  She underwent an evaluation for dizziness and CT angiography demonstrates bilateral carotid stenosis and a right vertebral and basilar stenosis.  The left vertebral appears to terminate at the PICA. An ultrasound was consistent with at least moderate stenosis.  The option of further evaluation with angiography as well as treatment with carotid endarterectomy or angioplasty was discussed in great detail.  An quantitative MRA with NOVA actually demonstrates normal flow in the carotid arteries bilaterally and in the posterior circulation.  September 2019 she presented to [facility] with right sided paresthesiae and paresis, admitted to MICU with suspect CVA, required tPA with improvement in symptoms and resolution within 24 hours. Repeat CTH negative for ICH. CTA neck with bilateral JUAN J 60%, MRI with no acute finding.  I proceeded with formal cerebral angiography 10/15/2019.  This angiogram showed no significant carotid artery stenosis on either side. She did not require angioplasty and stenting portion of the procedure. It also did not show any overt narrowing of the vertebral or basilar artery.  I recommend repeat carotid doppler ultrasound in one year October 2020 then follow up in the office after. Carotid doppler ultrasound 2019 showed LICA PSV 95 and SHAMIKA .  Additionally I recommend continued medical management with aspirin, plavix, and statin. \par \par  [FreeTextEntry3] : \par Sincerely,\par \par Daniel Link MD\par Professor of Neurological Surgery and Radiology\par  Department of Neurosurgery\par Director Cerebrovascular Surgery\par [employer] [facility]\par

## 2019-11-01 DIAGNOSIS — I65.02 OCCLUSION AND STENOSIS OF LEFT VERTEBRAL ARTERY: ICD-10-CM

## 2019-11-01 DIAGNOSIS — I65.23 OCCLUSION AND STENOSIS OF BILATERAL CAROTID ARTERIES: ICD-10-CM

## 2019-12-12 ENCOUNTER — APPOINTMENT (OUTPATIENT)
Dept: ORTHOPEDIC SURGERY | Facility: CLINIC | Age: 74
End: 2019-12-12
Payer: MEDICARE

## 2019-12-12 VITALS
SYSTOLIC BLOOD PRESSURE: 151 MMHG | BODY MASS INDEX: 19.99 KG/M2 | HEART RATE: 65 BPM | WEIGHT: 120 LBS | DIASTOLIC BLOOD PRESSURE: 74 MMHG | HEIGHT: 65 IN

## 2019-12-12 DIAGNOSIS — M21.612 BUNION OF LEFT FOOT: ICD-10-CM

## 2019-12-12 DIAGNOSIS — M79.675 PAIN IN LEFT TOE(S): ICD-10-CM

## 2019-12-12 DIAGNOSIS — G57.91 UNSPECIFIED MONONEUROPATHY OF RIGHT LOWER LIMB: ICD-10-CM

## 2019-12-12 DIAGNOSIS — M21.611 BUNION OF RIGHT FOOT: ICD-10-CM

## 2019-12-12 DIAGNOSIS — G89.29 PAIN IN RIGHT TOE(S): ICD-10-CM

## 2019-12-12 DIAGNOSIS — M20.41 OTHER HAMMER TOE(S) (ACQUIRED), RIGHT FOOT: ICD-10-CM

## 2019-12-12 DIAGNOSIS — G57.92 UNSPECIFIED MONONEUROPATHY OF LEFT LOWER LIMB: ICD-10-CM

## 2019-12-12 DIAGNOSIS — G89.29 PAIN IN LEFT TOE(S): ICD-10-CM

## 2019-12-12 DIAGNOSIS — M79.7 FIBROMYALGIA: ICD-10-CM

## 2019-12-12 DIAGNOSIS — M79.674 PAIN IN RIGHT TOE(S): ICD-10-CM

## 2019-12-12 DIAGNOSIS — M20.42 OTHER HAMMER TOE(S) (ACQUIRED), RIGHT FOOT: ICD-10-CM

## 2019-12-12 PROCEDURE — 99204 OFFICE O/P NEW MOD 45 MIN: CPT

## 2019-12-12 PROCEDURE — 73630 X-RAY EXAM OF FOOT: CPT | Mod: 50

## 2019-12-12 NOTE — PHYSICAL EXAM
[de-identified] : 3V of the BL feet were ordered obtained and reviewed by me today, 12/12/2019 , revealed: No fracture, hallux valgus [de-identified] : General: Alert and oriented x3. In no acute distress. Pleasant in nature with a normal affect. No apparent respiratory distress.\par \par R Foot Exam\par Skin: Clean, dry, intact\par Inspection: +hallux valgus, +hammertoe. No obvious malalignment, no masses, no swelling, no effusion\par Pulses: 2+ DP/PT pulses\par ROM: FOOT Full ROM of digits, ANKLE 10 degrees of dorsiflexion, 40 degrees of plantarflexion, 10 degrees of subtalar motion.\par Painful ROM: None\par Tenderness: No tenderness over the medial malleolus, No tenderness over the lateral malleolus, no CFL/ATFL/PTFL pain, no deltoid ligament pain. No heel pain. No Achilles tenderness. No 5th metatarsal pain. No pain to the LisFranc joint. No ttp over the posterior tibial tendon.\par Stability: Negative anterior/posterior drawer.\par Strength: 5/5 ADD/ABD/TA/GS/EHL/FHL/EDL\par Neuro: Sensation in tact to light touch throughout\par Additional tests: Negative Mortons test, negative tarsal tunnel tinels, negative single heel rise. \par \par L Foot Exam\par Skin: Clean, dry, intact\par Inspection: No obvious malalignment, no masses, no swelling, no effusion\par Pulses: 2+ DP/PT pulses\par ROM: FOOT Full ROM of digits, ANKLE 10 degrees of dorsiflexion, 40 degrees of plantarflexion, 10 degrees of subtalar motion.\par Painful ROM: None\par Tenderness: No tenderness over the medial malleolus, No tenderness over the lateral malleolus, no CFL/ATFL/PTFL pain, no deltoid ligament pain. No heel pain. No Achilles tenderness. No 5th metatarsal pain. No pain to the LisFranc joint. No ttp over the posterior tibial tendon.\par Stability: Negative anterior/posterior drawer.\par Strength: 5/5 ADD/ABD/TA/GS/EHL/FHL/EDL\par Neuro: Sensation in tact to light touch throughout\par Additional tests: Negative Mortons test, negative tarsal tunnel tinels, negative single heel rise.

## 2019-12-12 NOTE — ADDENDUM
[FreeTextEntry1] : I, James Conway, acted solely as a scribe for Dr. Matt De Luna on this date 12/12/2019 .\par All medical record entries made by the Scribe were at my, Dr. Matt De Luna, direction and personally dictated by me on 12/12/2019 . I have reviewed the chart and agree that the record accurately reflects my personal performance of the history, physical exam, assessment and plan. I have also personally directed, reviewed, and agreed with the chart.

## 2019-12-12 NOTE — CONSULT LETTER
[Consult Letter:] : I had the pleasure of evaluating your patient, [unfilled]. [Consult Closing:] : Thank you very much for allowing me to participate in the care of this patient.  If you have any questions, please do not hesitate to contact me. [Please see my note below.] : Please see my note below. [Sincerely,] : Sincerely, [FreeTextEntry3] : Matt De Luna, DO\par Foot and Ankle Surgery\par

## 2019-12-12 NOTE — DISCUSSION/SUMMARY
[de-identified] : Today I had a lengthy discussion with the patient regarding their BL foot pain.I have addressed all the patient's concerns surrounding the pathology of their condition. A discussion was had about shoe wear modifications. I advised the patient to utilize a supportive cross training sneaker. I suggested New Balance, Parisi, or Sau[employer]y. I recommend that the patient utilize NSAIDs PRN. I also recommend that the patient utilize  I recommend the patient undergo a course of physical therapy for the BL feet 2-3 times a week for a total of 6-8 weeks. A prescription was given for the physical therapy today. I recommend that the patient utilize a Budin splint. She was provided with the splint in the office today. I also advised the patient to consider seeing her neurologist for further optimization. I would like to see the patient back in the office in 2-3 months to reassess their condition. The patient understood and verbally agreed to the treatment plan. All of their questions were answered and they were satisfied with the visit. The patient should call the office if they have any questions or experience worsening symptoms.\par \par \par

## 2019-12-12 NOTE — HISTORY OF PRESENT ILLNESS
[FreeTextEntry1] : 74 year old female presenting with BL foot pain. The right side is worse than the left. The patient’s pain is noted to be a 5/10. The patient cannot attribute their pain to any specific injury, fall, or trauma. The patient has been having chronic pain beginning in 2018. The patient describes their pain as constant, radiating, sharp, burning, throbbing, and shooting. The pain is made worse by walking. She has previously seen a podiatrist. The patient reports she had a stroke on 9/10/19. She is currently taking Aspirin, Tylenol, utilizing ice and a compression stocking. She also utilized Voltaren gel. The patient has also utilized orthotics. No other complaints at this time.\par \par \par

## 2020-01-07 ENCOUNTER — OUTPATIENT (OUTPATIENT)
Dept: OUTPATIENT SERVICES | Facility: HOSPITAL | Age: 75
LOS: 1 days | End: 2020-01-07
Payer: MEDICARE

## 2020-01-07 DIAGNOSIS — Z95.5 PRESENCE OF CORONARY ANGIOPLASTY IMPLANT AND GRAFT: Chronic | ICD-10-CM

## 2020-01-07 DIAGNOSIS — G47.31 PRIMARY CENTRAL SLEEP APNEA: ICD-10-CM

## 2020-01-07 DIAGNOSIS — Z95.1 PRESENCE OF AORTOCORONARY BYPASS GRAFT: Chronic | ICD-10-CM

## 2020-01-07 DIAGNOSIS — Z98.890 OTHER SPECIFIED POSTPROCEDURAL STATES: Chronic | ICD-10-CM

## 2020-01-07 DIAGNOSIS — Z90.49 ACQUIRED ABSENCE OF OTHER SPECIFIED PARTS OF DIGESTIVE TRACT: Chronic | ICD-10-CM

## 2020-01-07 PROCEDURE — 95810 POLYSOM 6/> YRS 4/> PARAM: CPT | Mod: 26

## 2020-01-07 PROCEDURE — 95810 POLYSOM 6/> YRS 4/> PARAM: CPT

## 2020-02-14 NOTE — ASU PATIENT PROFILE, ADULT - FALL HARM RISK CONCLUSION
[FreeTextEntry1] : 3 yo here f/u for gastroenteritis, hospitalized for dehydration \par Now taking small amounts with a syringe\par Can use Zofran every 8 hours PRN vomiting\par OK to try milk if that is all he will drink \par He looks hydrated today on exam\par Min 4 wet diapers/24 hours and discussed s/s of dehydration including sunken eyes, no tears and dry mucus membranes \par BRAT diet \par Follow up PRN worsening symptoms, persistent fever of 100.4 or more or failure to improve.\par 
Universal Safety Interventions

## 2020-05-15 ENCOUNTER — APPOINTMENT (OUTPATIENT)
Dept: GASTROENTEROLOGY | Facility: CLINIC | Age: 75
End: 2020-05-15
Payer: MEDICARE

## 2020-05-15 VITALS — WEIGHT: 120 LBS | BODY MASS INDEX: 19.97 KG/M2

## 2020-05-15 DIAGNOSIS — Z11.59 ENCOUNTER FOR SCREENING FOR OTHER VIRAL DISEASES: ICD-10-CM

## 2020-05-15 DIAGNOSIS — K52.9 NONINFECTIVE GASTROENTERITIS AND COLITIS, UNSPECIFIED: ICD-10-CM

## 2020-05-15 PROCEDURE — 99215 OFFICE O/P EST HI 40 MIN: CPT | Mod: 95

## 2020-05-15 RX ORDER — LEVOTHYROXINE SODIUM 125 UG/1
125 TABLET ORAL DAILY
Refills: 0 | Status: COMPLETED | COMMUNITY
End: 2020-05-15

## 2020-05-15 NOTE — ASSESSMENT
[FreeTextEntry1] : Diarrhea of unknown etiology: Possibly a manifestation of hyperthyroidism, possibly infectious.  Will send some blood work and stool tests, and check for SIBO.  No indication to do a colonoscopy at this point.  Will reassess need based on workup.\par \par Viral illness in January: Will send COVID-19 IgG.\par \par Nausea: Ondansetron 4 mg PRN\par \par Follow up TBD by above workup.

## 2020-05-15 NOTE — HISTORY OF PRESENT ILLNESS
[Other Location: e.g. School (Enter Location, City,State)___] : at [unfilled], at the time of the visit. [Other Location: e.g. Home (Enter Location, City,State)___] : at [unfilled] [Other:____] : [unfilled] [Patient] : the patient [Self] : self [de-identified] : This is a darlene 75-year-old woman, well known to me with a history of CAD, COPD, IBS, PVD, hypothyroidism, and ineffective esophageal motility who request this appointment to discuss subacute diarrhea that started about one month ago.  She has experienced some accompanying nausea, lower epigastric pain, and has been having about 4-5 loose stools per day.  Her appetite has been lessened, though she does not believe that she has lost any weight.  She has been taking Pepto-Bismol, which does assuage her symptoms.  She also reports a few episodes of fecal incontinence during this time.\par \par She reports that she has been having some issues with her thyroid.  She has typically been hypothyroid, but recent has been having some issues with being hyperthyroid, which her endocrinologist has been managing.  Lastly, she reports having had an acute viral illness in January with fevers and URI symptoms that lasted a few weeks and has since resolved.

## 2020-06-03 ENCOUNTER — APPOINTMENT (OUTPATIENT)
Dept: GASTROENTEROLOGY | Facility: CLINIC | Age: 75
End: 2020-06-03
Payer: MEDICARE

## 2020-06-03 VITALS
HEIGHT: 65 IN | BODY MASS INDEX: 20.16 KG/M2 | TEMPERATURE: 98.2 F | WEIGHT: 121 LBS | SYSTOLIC BLOOD PRESSURE: 153 MMHG | DIASTOLIC BLOOD PRESSURE: 81 MMHG | HEART RATE: 69 BPM

## 2020-06-03 DIAGNOSIS — R11.0 NAUSEA: ICD-10-CM

## 2020-06-03 DIAGNOSIS — R12 HEARTBURN: ICD-10-CM

## 2020-06-03 PROCEDURE — 99214 OFFICE O/P EST MOD 30 MIN: CPT

## 2020-06-03 NOTE — HISTORY OF PRESENT ILLNESS
[de-identified] : Patient presents for follow up for postprandial epigastric pain and nausea.  Typically, she awakens feeling fine.  After eating breakfast, she will experience epigastric pain and nausea that has been debilitating.  She has been taking Pepto-Bismol without much relief.  Zofran was not helpful.  Recent blood and stool tests were unrevealing.  Appetite is poor.  Stools are like marbles.

## 2020-06-03 NOTE — PHYSICAL EXAM
[General Appearance - Alert] : alert [General Appearance - In No Acute Distress] : in no acute distress [Sclera] : the sclera and conjunctiva were normal [PERRL With Normal Accommodation] : pupils were equal in size, round, and reactive to light [Extraocular Movements] : extraocular movements were intact [Outer Ear] : the ears and nose were normal in appearance [Oropharynx] : the oropharynx was normal [Neck Appearance] : the appearance of the neck was normal [Neck Cervical Mass (___cm)] : no neck mass was observed [Thyroid Diffuse Enlargement] : the thyroid was not enlarged [Jugular Venous Distention Increased] : there was no jugular-venous distention [Thyroid Nodule] : there were no palpable thyroid nodules [Auscultation Breath Sounds / Voice Sounds] : lungs were clear to auscultation bilaterally [Heart Rate And Rhythm] : heart rate was normal and rhythm regular [Heart Sounds] : normal S1 and S2 [Heart Sounds Gallop] : no gallops [Murmurs] : no murmurs [Heart Sounds Pericardial Friction Rub] : no pericardial rub [Edema] : there was no peripheral edema [Bowel Sounds] : normal bowel sounds [Abdomen Soft] : soft [] : no hepato-splenomegaly [Abdomen Mass (___ Cm)] : no abdominal mass palpated [RLQ] : in the right lower quadrant [Epigastric] : in the epigastric area [Cervical Lymph Nodes Enlarged Posterior Bilaterally] : posterior cervical [Supraclavicular Lymph Nodes Enlarged Bilaterally] : supraclavicular [Cervical Lymph Nodes Enlarged Anterior Bilaterally] : anterior cervical [Nail Clubbing] : no clubbing  or cyanosis of the fingernails [Skin Turgor] : normal skin turgor [Skin Color & Pigmentation] : normal skin color and pigmentation [Oriented To Time, Place, And Person] : oriented to person, place, and time [No Focal Deficits] : no focal deficits [Affect] : the affect was normal [Impaired Insight] : insight and judgment were intact

## 2020-06-03 NOTE — ASSESSMENT
[FreeTextEntry1] : Etiology of symptoms not clear.  Will get CT A/P, gastric emptying study and schedule semiurgent EGD.

## 2020-06-08 ENCOUNTER — APPOINTMENT (OUTPATIENT)
Dept: CT IMAGING | Facility: CLINIC | Age: 75
End: 2020-06-08
Payer: MEDICARE

## 2020-06-08 ENCOUNTER — OUTPATIENT (OUTPATIENT)
Dept: OUTPATIENT SERVICES | Facility: HOSPITAL | Age: 75
LOS: 1 days | End: 2020-06-08
Payer: MEDICARE

## 2020-06-08 DIAGNOSIS — Z95.1 PRESENCE OF AORTOCORONARY BYPASS GRAFT: Chronic | ICD-10-CM

## 2020-06-08 DIAGNOSIS — R10.84 GENERALIZED ABDOMINAL PAIN: ICD-10-CM

## 2020-06-08 DIAGNOSIS — Z95.5 PRESENCE OF CORONARY ANGIOPLASTY IMPLANT AND GRAFT: Chronic | ICD-10-CM

## 2020-06-08 DIAGNOSIS — R10.13 EPIGASTRIC PAIN: ICD-10-CM

## 2020-06-08 DIAGNOSIS — Z90.49 ACQUIRED ABSENCE OF OTHER SPECIFIED PARTS OF DIGESTIVE TRACT: Chronic | ICD-10-CM

## 2020-06-08 DIAGNOSIS — R11.0 NAUSEA: ICD-10-CM

## 2020-06-08 DIAGNOSIS — Z98.890 OTHER SPECIFIED POSTPROCEDURAL STATES: Chronic | ICD-10-CM

## 2020-06-08 PROCEDURE — 74177 CT ABD & PELVIS W/CONTRAST: CPT

## 2020-06-08 PROCEDURE — 74177 CT ABD & PELVIS W/CONTRAST: CPT | Mod: 26

## 2020-06-19 ENCOUNTER — RESULT REVIEW (OUTPATIENT)
Age: 75
End: 2020-06-19

## 2020-06-19 ENCOUNTER — OUTPATIENT (OUTPATIENT)
Dept: OUTPATIENT SERVICES | Facility: HOSPITAL | Age: 75
LOS: 1 days | End: 2020-06-19
Payer: MEDICARE

## 2020-06-19 DIAGNOSIS — R11.0 NAUSEA: ICD-10-CM

## 2020-06-19 DIAGNOSIS — R10.84 GENERALIZED ABDOMINAL PAIN: ICD-10-CM

## 2020-06-19 DIAGNOSIS — Z98.890 OTHER SPECIFIED POSTPROCEDURAL STATES: Chronic | ICD-10-CM

## 2020-06-19 DIAGNOSIS — Z90.49 ACQUIRED ABSENCE OF OTHER SPECIFIED PARTS OF DIGESTIVE TRACT: Chronic | ICD-10-CM

## 2020-06-19 DIAGNOSIS — Z95.1 PRESENCE OF AORTOCORONARY BYPASS GRAFT: Chronic | ICD-10-CM

## 2020-06-19 DIAGNOSIS — Z95.5 PRESENCE OF CORONARY ANGIOPLASTY IMPLANT AND GRAFT: Chronic | ICD-10-CM

## 2020-06-19 LAB — GLUCOSE BLDC GLUCOMTR-MCNC: 89 MG/DL — SIGNIFICANT CHANGE UP (ref 70–99)

## 2020-06-19 PROCEDURE — 78264 GASTRIC EMPTYING IMG STUDY: CPT | Mod: 26,GC

## 2020-06-19 PROCEDURE — 82962 GLUCOSE BLOOD TEST: CPT

## 2020-06-19 PROCEDURE — A9541: CPT

## 2020-06-19 PROCEDURE — 78264 GASTRIC EMPTYING IMG STUDY: CPT

## 2020-08-19 ENCOUNTER — APPOINTMENT (OUTPATIENT)
Dept: DISASTER EMERGENCY | Facility: CLINIC | Age: 75
End: 2020-08-19

## 2020-08-19 DIAGNOSIS — Z01.818 ENCOUNTER FOR OTHER PREPROCEDURAL EXAMINATION: ICD-10-CM

## 2020-08-20 VITALS
HEIGHT: 65 IN | SYSTOLIC BLOOD PRESSURE: 195 MMHG | DIASTOLIC BLOOD PRESSURE: 81 MMHG | RESPIRATION RATE: 20 BRPM | HEART RATE: 71 BPM | WEIGHT: 123.02 LBS

## 2020-08-20 LAB — SARS-COV-2 N GENE NPH QL NAA+PROBE: NOT DETECTED

## 2020-08-20 NOTE — H&P PST ADULT - NSICDXPASTSURGICALHX_GEN_ALL_CORE_FT
PAST SURGICAL HISTORY:  H/O endoscopy     History of appendectomy     History of cholecystectomy     History of hemorrhoidectomy     Presence of stent in anterior descending branch of left coronary artery mLAD    Presence of stent in coronary artery Left main    Presence of stent in left circumflex coronary artery pLCX    Presence of stent in right coronary artery dRCA, RPDA    S/P CABG x 3 2015, LIMA to the LAD, SVG to the Ramus, SVG to the RPDA (known to be occluded)    Status post insertion of drug-eluting stent into left anterior descending (LAD) artery for coronary SYNERGY 3.0MM X 12MM drug-eluting stent pLAD and SYNERGY 2.500MM X 28MM drug-eluting stent D1.

## 2020-08-20 NOTE — H&P PST ADULT - CIGARETTES, PACKS/DAY
Problem: Altered Mood, Manic Behavior  Goal: LTG-Mood stable  Outcome: Ongoing  Pt declined to attend psychotherapy at 10:00am despite encouragement from staff. Pt offered 1:1 at this time. 1.5

## 2020-08-20 NOTE — H&P PST ADULT - NSICDXFAMILYHX_GEN_ALL_CORE_FT
FAMILY HISTORY:  Family history of heart disease    Father  Still living? Unknown  Family history of cerebrovascular accident (CVA), Age at diagnosis: Age Unknown  Family history of congenital heart disease, Age at diagnosis: Age Unknown    Mother  Still living? Unknown  Family history of Parkinson's disease, Age at diagnosis: Age Unknown    Sibling  Still living? Unknown  Family history of sudden cardiac death (SCD), Age at diagnosis: Age Unknown

## 2020-08-20 NOTE — H&P PST ADULT - OTHER CARE PROVIDERS
Micheal Ladd ([address] [place] [address], [address], [place] [address], [phone])

## 2020-08-20 NOTE — H&P PST ADULT - NSICDXPASTMEDICALHX_GEN_ALL_CORE_FT
PAST MEDICAL HISTORY:  Aortic stenosis     Chest pain     COPD (chronic obstructive pulmonary disease)     Coronary artery disease involving native coronary artery of native heart, angina presence unspecifie     Fibromyalgia     H/O TIA (transient ischemic attack) and stroke     Hyperlipidemia, unspecified hyperlipidemia type     Hypothyroid     IBS (irritable bowel syndrome)     IBS (irritable bowel syndrome)     MVP (mitral valve prolapse)     PAD (peripheral artery disease)     Pancreatitis 2011    TIA (transient ischemic attack)

## 2020-08-20 NOTE — H&P PST ADULT - HISTORY OF PRESENT ILLNESS
76y/o female former smoker with history of CAD, 3V CABG (LIMA to the LAD, SVG to the Ramus, SVG to the RPDA <known to be occluded>), multiple PCI's (LM, pLAD, mLAD, D1, pLCX, dRCA, RPDA), CVA (9/10/2019, treated with tPA), B/L carotid artery stenosis    Symptoms:        Angina (Class):        Ischemic Symptoms:     Heart Failure:        Systolic/Diastolic/Combined:        NYHA Class (within 2 weeks):     Assessment of LVEF:       EF:        Assessed by:        Date:     Prior Cardiac Interventions:       PCI's:   LHC/PCI: 11/20/2018  VENTRICLES: There were no left ventricular global or regional wall motion abnormalities. Global left ventricular function was normal. EF estimated was 65 %.  VALVES: MITRAL VALVE: The mitral valve exhibited no regurgitation.  CORONARY VESSELS: The coronary circulation is right dominant.  LM:     --  LM: There was a tubular 0 % stenosis at the site of a prior stent.  LAD:     --  Ostial LAD: There was a tubular 0 % stenosis at the site of a prior stent.  --  Proximal LAD: There was a tubular 70 % stenosis which was treated with a SYNERGY 3.0MM X 12MM drug-eluting stent.  --  Mid LAD: There was a 100 % stenosis.  --  D1: There was a tubular 85 % stenosis which was treated with a SYNERGY 2.500MM X 28MM drug-eluting stent.  CX:     --  Proximal circumflex: There was a tubular 0 % stenosis at the site of a prior stent.  RI:     --  Proximal ramus intermedius: There was a 100 % stenosis.  RCA:     --  Distal RCA: There was a tubular 20 % stenosis at the proximal margin of the stented segment. In a second lesion, there was a discrete 10 % stenosis at the site of a prior stent.  --  RPDA: There was a tubular 25 % stenosis at the site of a prior stent.  GRAFTS:     --  Graft to the mid LAD: The graft was a LIMA. It was normal.  --  Graft to the ramus intermedius: The graft was a saphenous vein graft from the aorta. Graft angiography showed minor luminal irregularities.  --  Graft to the RPDA: The graft was a saphenous vein graft from the aorta. There was a 100 % stenosis at the proximal anastomosis.         CABG:     Noninvasive Testing:   Stress Test: Date:        Protocol:        Duration of Exercise:        Symptoms:        EKG Changes:        DTS:        Myocardial Imaging:        Risk Assessment:     Echo:     Antianginal Therapies:        Beta Blockers:         Calcium Channel Blockers:        Long Acting Nitrates:        Ranexa:     Associated Risk Factors:        Cerebrovascular Disease: N/A       Chronic Lung Disease: N/A       Peripheral Arterial Disease: N/A       Chronic Kidney Disease (if yes, what is GFR): N/A       Uncontrolled Diabetes (if yes, what is HgbA1C or FBS): N/A       Poorly Controlled Hypertension (if yes, what is SBP): N/A       Morbid Obesity (if yes, what is BMI): N/A       History of Recent Ventricular Arrhythmia: N/A       Inability to Ambulate Safely: N/A       Need for Therapeutic Anticoagulation: N/A       Antiplatelet or Contrast Allergy: N/A 74y/o female former smoker with history of CAD, 3V CABG (LIMA to the LAD, SVG to the Ramus, SVG to the RPDA [known to be occluded]), multiple PCI's (LM, pLAD, mLAD, D1, pLCX, dRCA, RPDA), CVA (9/10/2019, treated with tPA), B/L carotid artery stenosis c/o AGEE and exertional chest heaviness, fatigue, 3 pillow orthopnea, and PND.    Symptoms:        Angina (Class): 3       Ischemic Symptoms: AGEE and fatigue (CCS class 3 anginal equivalent)    Heart Failure: N/A    Assessment of LVEF: N/A    Prior Cardiac Interventions:       PCI's:   LHC/PCI: 11/20/2018  VENTRICLES: There were no left ventricular global or regional wall motion abnormalities. Global left ventricular function was normal. EF estimated was 65 %.  VALVES: MITRAL VALVE: The mitral valve exhibited no regurgitation.  CORONARY VESSELS: The coronary circulation is right dominant.  LM:     --  LM: There was a tubular 0 % stenosis at the site of a prior stent.  LAD:     --  Ostial LAD: There was a tubular 0 % stenosis at the site of a prior stent.  --  Proximal LAD: There was a tubular 70 % stenosis which was treated with a SYNERGY 3.0MM X 12MM drug-eluting stent.  --  Mid LAD: There was a 100 % stenosis.  --  D1: There was a tubular 85 % stenosis which was treated with a SYNERGY 2.500MM X 28MM drug-eluting stent.  CX:     --  Proximal circumflex: There was a tubular 0 % stenosis at the site of a prior stent.  RI:     --  Proximal ramus intermedius: There was a 100 % stenosis.  RCA:     --  Distal RCA: There was a tubular 20 % stenosis at the proximal margin of the stented segment. In a second lesion, there was a discrete 10 % stenosis at the site of a prior stent.  --  RPDA: There was a tubular 25 % stenosis at the site of a prior stent.  GRAFTS:     --  Graft to the mid LAD: The graft was a LIMA. It was normal.  --  Graft to the ramus intermedius: The graft was a saphenous vein graft from the aorta. Graft angiography showed minor luminal irregularities.  --  Graft to the RPDA: The graft was a saphenous vein graft from the aorta. There was a 100 % stenosis at the proximal anastomosis.    [facility]: 1/15/2019  VENTRICLES: There were no left ventricular global or regional wall motion abnormalities. Global left ventricular function was normal. EF estimated was 60 %.  VALVES: MITRAL VALVE: The mitral valve exhibited trivial regurgitation (less than 1+).  CORONARY VESSELS: The coronary circulation is right dominant.  LM:     --  LM: There was a tubular 0 % stenosis at the site of a prior stent.  LAD:     --  Ostial LAD: There was a tubular 0 % stenosis at the site of a prior stent.  --  Proximal LAD: There was a tubular 0 % stenosis at the site of a prior stent.  --  Mid LAD: There was a diffuse 100 % stenosis at the site of a prior stent.  --  D1: There was a tubular 0 % stenosis at the site of a prior stent.  CX:     --  Proximal circumflex: There was a tubular 0 % stenosis at the site of a prior stent.  RI:     --  Proximal ramus intermedius: There was a 100 % stenosis.  RCA:     --Distal RCA: There was a tubular 20 % stenosis at the site of a prior stent.  --  RPDA: There was a tubular 25 % stenosis at the site of a prior stent.  GRAFTS:     --  Graft to the mid LAD: The graft was a LIMA. It was normal.  --  Graft to the ramus intermedius: The graft was a saphenous vein graft from the aorta. Graft angiography showed minor luminal irregularities.  --  Graft to the RPDA: The graft was a saphenous vein graft from the aorta. There was a 100 % stenosis at the proximal anastomosis.         CABG: LIMA to the LAD, SVG to the Ramus, SVG to the RPDA (known to be occluded)    Noninvasive Testing:   Stress Test: N/A    Echo: 9/12/2019  Left Ventricle: The left ventricular internal cavity size is normal. Left ventricular wall thickness is normal. Global LV systolic function was normal. Left ventricular ejection fraction, by visual estimation, is 60 to 65%. Spectral Doppler shows impaired relaxation pattern of left ventricular myocardial filling (Grade I diastolic dysfunction).  Right Ventricle: Normal right ventricular size and function.  Left Atrium: Mildly enlarged left atrium.  Right Atrium: The right atrium is normal in size.  Pericardium: There is no evidence of pericardial effusion.  Mitral Valve: Thickening of the anterior and posterior mitral valve leaflets. There is mild mitral annular calcification. No evidence of mitral stenosis. Trace mitral valve regurgitation is seen.  Tricuspid Valve: Structurally normal tricuspid valve, with normal leaflet excursion. Mild tricuspid regurgitation is visualized.  Aortic Valve: The aortic valve is trileaflet. Sclerotic aortic valve with normal opening. Peak transaortic gradient equals 11.0 mmHg, mean transaortic gradient equals 5.6 mmHg, the calculated aortic valve area equals 2.38 cm² by the continuity equation. Mild aortic valve regurgitation is seen.  Pulmonic Valve: The pulmonic valve was not well visualized. Trace pulmonic valve regurgitation.  Aorta: The aortic root is normal in size and structure.  Pulmonary Artery: The main pulmonary artery is normal in size.  Venous: A normal flow pattern is recorded from the right upper pulmonary vein. The inferior vena cava is normal. The inferior vena cava was normal sized, with respiratory size variation greater than 50%. The inferior vena cava and the hepatic vein show a normal flow pattern.    Antianginal Therapies:        Beta Blockers: N/A       Calcium Channel Blockers: Amlodipine 2.5 mg BID       Long Acting Nitrates: N/A       Ranexa: N/A    Associated Risk Factors:        Cerebrovascular Disease: CVA (9/2019) and B/L carotid artery stenosis       Chronic Lung Disease: COPD       Peripheral Arterial Disease: Yes       Chronic Kidney Disease (if yes, what is GFR): N/A       Uncontrolled Diabetes (if yes, what is HgbA1C or FBS): N/A       Poorly Controlled Hypertension (if yes, what is SBP): N/A       Morbid Obesity (if yes, what is BMI): N/A       History of Recent Ventricular Arrhythmia: N/A       Inability to Ambulate Safely: N/A       Need for Therapeutic Anticoagulation: N/A       Antiplatelet or Contrast Allergy: N/A 74y/o female former smoker with history of CAD, 3V CABG (LIMA to the LAD, SVG to the Ramus, SVG to the RPDA [known to be occluded]), multiple PCI's (LM, pLAD, mLAD, D1, pLCX, dRCA, RPDA), CVA (9/10/2019, treated with tPA), B/L carotid artery stenosis c/o AGEE and exertional chest heaviness (CCS class 3, worse over the past 1 - 2 weeks), fatigue (worse over the past 2 months), 3 pillow orthopnea, and PND. She has also noted occasional palpitations and lower extremity edema.    Symptoms:        Angina (Class): 3       Ischemic Symptoms: AGEE and fatigue (CCS class 3 anginal equivalent)    Heart Failure: N/A    Assessment of LVEF: N/A    Prior Cardiac Interventions:       PCI's:   LHC/PCI: 11/20/2018  VENTRICLES: There were no left ventricular global or regional wall motion abnormalities. Global left ventricular function was normal. EF estimated was 65 %.  VALVES: MITRAL VALVE: The mitral valve exhibited no regurgitation.  CORONARY VESSELS: The coronary circulation is right dominant.  LM:     --  LM: There was a tubular 0 % stenosis at the site of a prior stent.  LAD:     --  Ostial LAD: There was a tubular 0 % stenosis at the site of a prior stent.  --  Proximal LAD: There was a tubular 70 % stenosis which was treated with a SYNERGY 3.0MM X 12MM drug-eluting stent.  --  Mid LAD: There was a 100 % stenosis.  --  D1: There was a tubular 85 % stenosis which was treated with a SYNERGY 2.500MM X 28MM drug-eluting stent.  CX:     --  Proximal circumflex: There was a tubular 0 % stenosis at the site of a prior stent.  RI:     --  Proximal ramus intermedius: There was a 100 % stenosis.  RCA:     --  Distal RCA: There was a tubular 20 % stenosis at the proximal margin of the stented segment. In a second lesion, there was a discrete 10 % stenosis at the site of a prior stent.  --  RPDA: There was a tubular 25 % stenosis at the site of a prior stent.  GRAFTS:     --  Graft to the mid LAD: The graft was a LIMA. It was normal.  --  Graft to the ramus intermedius: The graft was a saphenous vein graft from the aorta. Graft angiography showed minor luminal irregularities.  --  Graft to the RPDA: The graft was a saphenous vein graft from the aorta. There was a 100 % stenosis at the proximal anastomosis.    [facility]: 1/15/2019  VENTRICLES: There were no left ventricular global or regional wall motion abnormalities. Global left ventricular function was normal. EF estimated was 60 %.  VALVES: MITRAL VALVE: The mitral valve exhibited trivial regurgitation (less than 1+).  CORONARY VESSELS: The coronary circulation is right dominant.  LM:     --  LM: There was a tubular 0 % stenosis at the site of a prior stent.  LAD:     --  Ostial LAD: There was a tubular 0 % stenosis at the site of a prior stent.  --  Proximal LAD: There was a tubular 0 % stenosis at the site of a prior stent.  --  Mid LAD: There was a diffuse 100 % stenosis at the site of a prior stent.  --  D1: There was a tubular 0 % stenosis at the site of a prior stent.  CX:     --  Proximal circumflex: There was a tubular 0 % stenosis at the site of a prior stent.  RI:     --  Proximal ramus intermedius: There was a 100 % stenosis.  RCA:     --Distal RCA: There was a tubular 20 % stenosis at the site of a prior stent.  --  RPDA: There was a tubular 25 % stenosis at the site of a prior stent.  GRAFTS:     --  Graft to the mid LAD: The graft was a LIMA. It was normal.  --  Graft to the ramus intermedius: The graft was a saphenous vein graft from the aorta. Graft angiography showed minor luminal irregularities.  --  Graft to the RPDA: The graft was a saphenous vein graft from the aorta. There was a 100 % stenosis at the proximal anastomosis.         CABG: LIMA to the LAD, SVG to the Ramus, SVG to the RPDA (known to be occluded)    Noninvasive Testing:   Stress Test: N/A    Echo: 9/12/2019  Left Ventricle: The left ventricular internal cavity size is normal. Left ventricular wall thickness is normal. Global LV systolic function was normal. Left ventricular ejection fraction, by visual estimation, is 60 to 65%. Spectral Doppler shows impaired relaxation pattern of left ventricular myocardial filling (Grade I diastolic dysfunction).  Right Ventricle: Normal right ventricular size and function.  Left Atrium: Mildly enlarged left atrium.  Right Atrium: The right atrium is normal in size.  Pericardium: There is no evidence of pericardial effusion.  Mitral Valve: Thickening of the anterior and posterior mitral valve leaflets. There is mild mitral annular calcification. No evidence of mitral stenosis. Trace mitral valve regurgitation is seen.  Tricuspid Valve: Structurally normal tricuspid valve, with normal leaflet excursion. Mild tricuspid regurgitation is visualized.  Aortic Valve: The aortic valve is trileaflet. Sclerotic aortic valve with normal opening. Peak transaortic gradient equals 11.0 mmHg, mean transaortic gradient equals 5.6 mmHg, the calculated aortic valve area equals 2.38 cm² by the continuity equation. Mild aortic valve regurgitation is seen.  Pulmonic Valve: The pulmonic valve was not well visualized. Trace pulmonic valve regurgitation.  Aorta: The aortic root is normal in size and structure.  Pulmonary Artery: The main pulmonary artery is normal in size.  Venous: A normal flow pattern is recorded from the right upper pulmonary vein. The inferior vena cava is normal. The inferior vena cava was normal sized, with respiratory size variation greater than 50%. The inferior vena cava and the hepatic vein show a normal flow pattern.    Antianginal Therapies:        Beta Blockers: N/A       Calcium Channel Blockers: Amlodipine 2.5 mg BID       Long Acting Nitrates: N/A       Ranexa: N/A    Associated Risk Factors:        Cerebrovascular Disease: CVA (9/2019) and B/L carotid artery stenosis       Chronic Lung Disease: COPD       Peripheral Arterial Disease: Yes       Chronic Kidney Disease (if yes, what is GFR): N/A       Uncontrolled Diabetes (if yes, what is HgbA1C or FBS): N/A       Poorly Controlled Hypertension (if yes, what is SBP): N/A       Morbid Obesity (if yes, what is BMI): N/A       History of Recent Ventricular Arrhythmia: N/A       Inability to Ambulate Safely: N/A       Need for Therapeutic Anticoagulation: N/A       Antiplatelet or Contrast Allergy: N/A 74y/o female former smoker with history of CAD, 3V CABG (LIMA to the LAD, SVG to the Ramus, SVG to the RPDA [known to be occluded]), multiple PCI's (LM, pLAD, mLAD, D1, pLCX, dRCA, RPDA), CVA (9/10/2019, treated with tPA), B/L carotid artery stenosis c/o AGEE and exertional chest heaviness (CCS class 3, worse over the past 1 - 2 weeks, which she says is similar to prior to her CABG), fatigue (worse over the past 2 months), 3 pillow orthopnea, and PND. She has also noted occasional palpitations and lower extremity edema.    Symptoms:        Angina (Class): 3       Ischemic Symptoms: AGEE and fatigue (CCS class 3 anginal equivalent)    Heart Failure: N/A    Assessment of LVEF: N/A    Prior Cardiac Interventions:       PCI's:   LHC/PCI: 11/20/2018  VENTRICLES: There were no left ventricular global or regional wall motion abnormalities. Global left ventricular function was normal. EF estimated was 65 %.  VALVES: MITRAL VALVE: The mitral valve exhibited no regurgitation.  CORONARY VESSELS: The coronary circulation is right dominant.  LM:     --  LM: There was a tubular 0 % stenosis at the site of a prior stent.  LAD:     --  Ostial LAD: There was a tubular 0 % stenosis at the site of a prior stent.  --  Proximal LAD: There was a tubular 70 % stenosis which was treated with a SYNERGY 3.0MM X 12MM drug-eluting stent.  --  Mid LAD: There was a 100 % stenosis.  --  D1: There was a tubular 85 % stenosis which was treated with a SYNERGY 2.500MM X 28MM drug-eluting stent.  CX:     --  Proximal circumflex: There was a tubular 0 % stenosis at the site of a prior stent.  RI:     --  Proximal ramus intermedius: There was a 100 % stenosis.  RCA:     --  Distal RCA: There was a tubular 20 % stenosis at the proximal margin of the stented segment. In a second lesion, there was a discrete 10 % stenosis at the site of a prior stent.  --  RPDA: There was a tubular 25 % stenosis at the site of a prior stent.  GRAFTS:     --  Graft to the mid LAD: The graft was a LIMA. It was normal.  --  Graft to the ramus intermedius: The graft was a saphenous vein graft from the aorta. Graft angiography showed minor luminal irregularities.  --  Graft to the RPDA: The graft was a saphenous vein graft from the aorta. There was a 100 % stenosis at the proximal anastomosis.    [facility]: 1/15/2019  VENTRICLES: There were no left ventricular global or regional wall motion abnormalities. Global left ventricular function was normal. EF estimated was 60 %.  VALVES: MITRAL VALVE: The mitral valve exhibited trivial regurgitation (less than 1+).  CORONARY VESSELS: The coronary circulation is right dominant.  LM:     --  LM: There was a tubular 0 % stenosis at the site of a prior stent.  LAD:     --  Ostial LAD: There was a tubular 0 % stenosis at the site of a prior stent.  --  Proximal LAD: There was a tubular 0 % stenosis at the site of a prior stent.  --  Mid LAD: There was a diffuse 100 % stenosis at the site of a prior stent.  --  D1: There was a tubular 0 % stenosis at the site of a prior stent.  CX:     --  Proximal circumflex: There was a tubular 0 % stenosis at the site of a prior stent.  RI:     --  Proximal ramus intermedius: There was a 100 % stenosis.  RCA:     --Distal RCA: There was a tubular 20 % stenosis at the site of a prior stent.  --  RPDA: There was a tubular 25 % stenosis at the site of a prior stent.  GRAFTS:     --  Graft to the mid LAD: The graft was a LIMA. It was normal.  --  Graft to the ramus intermedius: The graft was a saphenous vein graft from the aorta. Graft angiography showed minor luminal irregularities.  --  Graft to the RPDA: The graft was a saphenous vein graft from the aorta. There was a 100 % stenosis at the proximal anastomosis.         CABG: LIMA to the LAD, SVG to the Ramus, SVG to the RPDA (known to be occluded)    Noninvasive Testing:   Stress Test: N/A    Echo: 9/12/2019  Left Ventricle: The left ventricular internal cavity size is normal. Left ventricular wall thickness is normal. Global LV systolic function was normal. Left ventricular ejection fraction, by visual estimation, is 60 to 65%. Spectral Doppler shows impaired relaxation pattern of left ventricular myocardial filling (Grade I diastolic dysfunction).  Right Ventricle: Normal right ventricular size and function.  Left Atrium: Mildly enlarged left atrium.  Right Atrium: The right atrium is normal in size.  Pericardium: There is no evidence of pericardial effusion.  Mitral Valve: Thickening of the anterior and posterior mitral valve leaflets. There is mild mitral annular calcification. No evidence of mitral stenosis. Trace mitral valve regurgitation is seen.  Tricuspid Valve: Structurally normal tricuspid valve, with normal leaflet excursion. Mild tricuspid regurgitation is visualized.  Aortic Valve: The aortic valve is trileaflet. Sclerotic aortic valve with normal opening. Peak transaortic gradient equals 11.0 mmHg, mean transaortic gradient equals 5.6 mmHg, the calculated aortic valve area equals 2.38 cm² by the continuity equation. Mild aortic valve regurgitation is seen.  Pulmonic Valve: The pulmonic valve was not well visualized. Trace pulmonic valve regurgitation.  Aorta: The aortic root is normal in size and structure.  Pulmonary Artery: The main pulmonary artery is normal in size.  Venous: A normal flow pattern is recorded from the right upper pulmonary vein. The inferior vena cava is normal. The inferior vena cava was normal sized, with respiratory size variation greater than 50%. The inferior vena cava and the hepatic vein show a normal flow pattern.    Antianginal Therapies:        Beta Blockers: N/A       Calcium Channel Blockers: Amlodipine 2.5 mg BID       Long Acting Nitrates: N/A       Ranexa: N/A    Associated Risk Factors:        Cerebrovascular Disease: CVA (9/2019) and B/L carotid artery stenosis       Chronic Lung Disease: COPD       Peripheral Arterial Disease: Yes       Chronic Kidney Disease (if yes, what is GFR): N/A       Uncontrolled Diabetes (if yes, what is HgbA1C or FBS): N/A       Poorly Controlled Hypertension (if yes, what is SBP): N/A       Morbid Obesity (if yes, what is BMI): N/A       History of Recent Ventricular Arrhythmia: N/A       Inability to Ambulate Safely: N/A       Need for Therapeutic Anticoagulation: N/A       Antiplatelet or Contrast Allergy: N/A

## 2020-08-20 NOTE — H&P PST ADULT - NEGATIVE NEUROLOGICAL SYMPTOMS
no vertigo/no headache/no weakness/no paresthesias/no transient paralysis/no generalized seizures/no syncope/no difficulty walking/no focal seizures/no loss of consciousness/no hemiparesis/no tremors/no loss of sensation

## 2020-08-20 NOTE — H&P PST ADULT - PRIMARY CARE PROVIDER
James Munson ([address] [place] [address] [address], [place] [address], [phone])

## 2020-08-20 NOTE — H&P PST ADULT - ASSESSMENT
ASA:   Mallampati:   GFR:   Adjusted Bleeding Risk:   COVID: Negative 8/19/2020    Problem List:   1.     Plan:   1. LHC and possible intervention. Consent obtained.  2. Will start DAPT if PCI performed.  3. IV: NS KVO  4. Aspirin if not taken today. 74y/o female former smoker with history of CAD, 3V CABG (LIMA to the LAD, SVG to the Ramus, SVG to the RPDA [known to be occluded]), multiple PCI's (LM, pLAD, mLAD, D1, pLCX, dRCA, RPDA), CVA (9/10/2019, treated with tPA), B/L carotid artery stenosis c/o AGEE and exertional chest heaviness (CCS class 3, worse over the past 1 - 2 weeks, which she says is similar to prior to her CABG), fatigue (worse over the past 2 months), 3 pillow orthopnea, and PND. She has also noted occasional palpitations and lower extremity edema.    ASA: 3  Mallampati: 2  GFR: 99  Adjusted Bleeding Risk: 2.1%  COVID: Negative 8/19/2020    Problem List:   1. Unstable angina    Plan:   1. LHC and possible intervention. Consent obtained.  2. Will start DAPT if PCI performed.  3. IV: NS KVO  4. Aspirin if not taken today.

## 2020-08-21 ENCOUNTER — OUTPATIENT (OUTPATIENT)
Dept: OUTPATIENT SERVICES | Facility: HOSPITAL | Age: 75
LOS: 1 days | End: 2020-08-21
Payer: MEDICARE

## 2020-08-21 ENCOUNTER — TRANSCRIPTION ENCOUNTER (OUTPATIENT)
Age: 75
End: 2020-08-21

## 2020-08-21 VITALS
DIASTOLIC BLOOD PRESSURE: 68 MMHG | OXYGEN SATURATION: 97 % | SYSTOLIC BLOOD PRESSURE: 149 MMHG | HEART RATE: 58 BPM | RESPIRATION RATE: 17 BRPM

## 2020-08-21 DIAGNOSIS — Z95.5 PRESENCE OF CORONARY ANGIOPLASTY IMPLANT AND GRAFT: Chronic | ICD-10-CM

## 2020-08-21 DIAGNOSIS — Z98.890 OTHER SPECIFIED POSTPROCEDURAL STATES: Chronic | ICD-10-CM

## 2020-08-21 DIAGNOSIS — Z95.1 PRESENCE OF AORTOCORONARY BYPASS GRAFT: Chronic | ICD-10-CM

## 2020-08-21 DIAGNOSIS — Z90.49 ACQUIRED ABSENCE OF OTHER SPECIFIED PARTS OF DIGESTIVE TRACT: Chronic | ICD-10-CM

## 2020-08-21 DIAGNOSIS — R94.39 ABNORMAL RESULT OF OTHER CARDIOVASCULAR FUNCTION STUDY: ICD-10-CM

## 2020-08-21 LAB
ANION GAP SERPL CALC-SCNC: 15 MMOL/L — SIGNIFICANT CHANGE UP (ref 5–17)
APTT BLD: 30.7 SEC — SIGNIFICANT CHANGE UP (ref 27.5–35.5)
BLD GP AB SCN SERPL QL: SIGNIFICANT CHANGE UP
BUN SERPL-MCNC: 15 MG/DL — SIGNIFICANT CHANGE UP (ref 8–20)
CALCIUM SERPL-MCNC: 9.8 MG/DL — SIGNIFICANT CHANGE UP (ref 8.6–10.2)
CHLORIDE SERPL-SCNC: 99 MMOL/L — SIGNIFICANT CHANGE UP (ref 98–107)
CO2 SERPL-SCNC: 23 MMOL/L — SIGNIFICANT CHANGE UP (ref 22–29)
CREAT SERPL-MCNC: 0.44 MG/DL — LOW (ref 0.5–1.3)
GLUCOSE SERPL-MCNC: 103 MG/DL — HIGH (ref 70–99)
HCT VFR BLD CALC: 41.5 % — SIGNIFICANT CHANGE UP (ref 34.5–45)
HGB BLD-MCNC: 14 G/DL — SIGNIFICANT CHANGE UP (ref 11.5–15.5)
INR BLD: 1.08 RATIO — SIGNIFICANT CHANGE UP (ref 0.88–1.16)
MAGNESIUM SERPL-MCNC: 2.2 MG/DL — SIGNIFICANT CHANGE UP (ref 1.6–2.6)
MCHC RBC-ENTMCNC: 32 PG — SIGNIFICANT CHANGE UP (ref 27–34)
MCHC RBC-ENTMCNC: 33.7 GM/DL — SIGNIFICANT CHANGE UP (ref 32–36)
MCV RBC AUTO: 95 FL — SIGNIFICANT CHANGE UP (ref 80–100)
PLATELET # BLD AUTO: 248 K/UL — SIGNIFICANT CHANGE UP (ref 150–400)
POTASSIUM SERPL-MCNC: 4.2 MMOL/L — SIGNIFICANT CHANGE UP (ref 3.5–5.3)
POTASSIUM SERPL-SCNC: 4.2 MMOL/L — SIGNIFICANT CHANGE UP (ref 3.5–5.3)
PROTHROM AB SERPL-ACNC: 12.5 SEC — SIGNIFICANT CHANGE UP (ref 10.6–13.6)
RBC # BLD: 4.37 M/UL — SIGNIFICANT CHANGE UP (ref 3.8–5.2)
RBC # FLD: 12.8 % — SIGNIFICANT CHANGE UP (ref 10.3–14.5)
SODIUM SERPL-SCNC: 137 MMOL/L — SIGNIFICANT CHANGE UP (ref 135–145)
WBC # BLD: 7.25 K/UL — SIGNIFICANT CHANGE UP (ref 3.8–10.5)
WBC # FLD AUTO: 7.25 K/UL — SIGNIFICANT CHANGE UP (ref 3.8–10.5)

## 2020-08-21 PROCEDURE — 99153 MOD SED SAME PHYS/QHP EA: CPT

## 2020-08-21 PROCEDURE — 85730 THROMBOPLASTIN TIME PARTIAL: CPT

## 2020-08-21 PROCEDURE — C1894: CPT

## 2020-08-21 PROCEDURE — 86900 BLOOD TYPING SEROLOGIC ABO: CPT

## 2020-08-21 PROCEDURE — 85610 PROTHROMBIN TIME: CPT

## 2020-08-21 PROCEDURE — 99152 MOD SED SAME PHYS/QHP 5/>YRS: CPT

## 2020-08-21 PROCEDURE — C1769: CPT

## 2020-08-21 PROCEDURE — C1760: CPT

## 2020-08-21 PROCEDURE — 36415 COLL VENOUS BLD VENIPUNCTURE: CPT

## 2020-08-21 PROCEDURE — 85027 COMPLETE CBC AUTOMATED: CPT

## 2020-08-21 PROCEDURE — 86850 RBC ANTIBODY SCREEN: CPT

## 2020-08-21 PROCEDURE — 93461 R&L HRT ART/VENTRICLE ANGIO: CPT

## 2020-08-21 PROCEDURE — 83735 ASSAY OF MAGNESIUM: CPT

## 2020-08-21 PROCEDURE — 86901 BLOOD TYPING SEROLOGIC RH(D): CPT

## 2020-08-21 PROCEDURE — C1889: CPT

## 2020-08-21 PROCEDURE — C1887: CPT

## 2020-08-21 PROCEDURE — 80048 BASIC METABOLIC PNL TOTAL CA: CPT

## 2020-08-21 RX ORDER — LOSARTAN POTASSIUM 100 MG/1
50 TABLET, FILM COATED ORAL ONCE
Refills: 0 | Status: COMPLETED | OUTPATIENT
Start: 2020-08-21 | End: 2020-08-21

## 2020-08-21 RX ORDER — ACETAMINOPHEN 500 MG
650 TABLET ORAL ONCE
Refills: 0 | Status: COMPLETED | OUTPATIENT
Start: 2020-08-21 | End: 2020-08-21

## 2020-08-21 RX ORDER — OLMESARTAN MEDOXOMIL 5 MG/1
1 TABLET, FILM COATED ORAL
Qty: 30 | Refills: 0
Start: 2020-08-21 | End: 2020-09-19

## 2020-08-21 RX ADMIN — Medication 650 MILLIGRAM(S): at 20:45

## 2020-08-21 RX ADMIN — LOSARTAN POTASSIUM 50 MILLIGRAM(S): 100 TABLET, FILM COATED ORAL at 19:46

## 2020-08-21 NOTE — DISCHARGE NOTE PROVIDER - CARE PROVIDER_API CALL
Micheal Ladd  CARDIOVASCULAR DISEASE  [address] [place] [address]  Phone: [phone]  Fax: [phone]  Established Patient  Follow Up Time: 2 weeks

## 2020-08-21 NOTE — DISCHARGE NOTE PROVIDER - NSDCMRMEDTOKEN_GEN_ALL_CORE_FT
amLODIPine 2.5 mg oral tablet: 1 tab(s) orally once a day  aspirin 81 mg oral tablet: 1 tab(s) orally once a day  atorvastatin 40 mg oral tablet: 1 tab(s) orally once a day (at bedtime)  clopidogrel 75 mg oral tablet: 1 tab(s) orally once a day  levothyroxine 125 mcg (0.125 mg) oral tablet: 1 tab(s) orally once a day  olmesartan 20 mg oral tablet: 1 tab(s) orally once a day   Prevacid 30 mg oral delayed release capsule: 1 cap(s) orally once a day

## 2020-08-21 NOTE — PROGRESS NOTE ADULT - SUBJECTIVE AND OBJECTIVE BOX
Department of Cardiology                                                                  [facility]/[place] [address]                                                            Telephone: [phone]. Fax:[phone]                                                                           Cardiac Catheterization Note       Subjective:  75y  Female who had a right and left heart catheterization which showed:  HEMODYNAMICS: Hemodynamic assessment demonstrates normal pulmonary capillary wedge pressure and normal pulmonary vascular resistance.  VENTRICLES: There were no left ventricular global or regional wall motion abnormalities. Global left ventricular function was normal. EF estimated was 70 %.  VALVES: MITRAL VALVE: The mitral valve exhibited no regurgitation.  CORONARY VESSELS: The coronary circulation is right dominant.  LM:    --  LM: There was a tubular 0 % stenosis at the site of a prior stent.  LAD:     --  Ostial LAD: There was a tubular 0 % stenosis at the site of a prior stent.  --  Proximal LAD: There was a diffuse 0 % stenosis at the site of a prior stent.  --  Mid LAD: There was a tubular 85 % stenosis proximal to the graft anastomosis.  --  D1: There was a tubular 0 % stenosis at the site of a prior stent.  CX:     --  Proximal circumflex: There was a tubular 0 % stenosis at the site of a prior stent.  --OM1: There was a tubular 90 % stenosis at the ostium of the vessel segment, proximal to the graft anastomosis.  RCA:     --  Distal RCA: There was a diffuse 20 % stenosis at the site of a prior stent.  --  RPDA: There was a tubular 25 % stenosis at the site of a prior stent.  GRAFTS:     --  Graft to the mid LAD: The graft was a LIMA. It was normal.  --  Graft to the 1st obtuse marginal: The graft was a saphenous vein graft from the aorta. There was a tubular 30 % stenosis at the proximal anastomosis.  --  Graft to the RPDA: The graft was a saphenous vein graft from the aorta. There was a 100 % stenosis at the proximal anastomosis.  Right Heart Pressures:       RA: 3       RV: 26/5       PA: 29/8/17       PCWP: 7       CO: 4.63       CI: 2.89       SVR: 24.39       PVR: 2.16    PAST MEDICAL & SURGICAL HISTORY:  Hyperlipidemia, unspecified hyperlipidemia type  Coronary artery disease involving native coronary artery of native heart, angina presence unspecifie  H/O TIA (transient ischemic attack) and stroke  IBS (irritable bowel syndrome)  PAD (peripheral artery disease)  MVP (mitral valve prolapse)  Aortic stenosis  Pancreatitis: 2011  Fibromyalgia  Hypothyroid  TIA (transient ischemic attack)  COPD (chronic obstructive pulmonary disease)  IBS (irritable bowel syndrome)  Chest pain  Presence of stent in coronary artery: Left main  History of hemorrhoidectomy  Presence of stent in right coronary artery: dRCA, RPDA  Presence of stent in left circumflex coronary artery: pLCX  Status post insertion of drug-eluting stent into left anterior descending (LAD) artery for coronary: SYNERGY 3.0MM X 12MM drug-eluting stent pLAD and SYNERGY 2.500MM X 28MM drug-eluting stent D1.  Presence of stent in anterior descending branch of left coronary artery: mLAD  H/O endoscopy  History of appendectomy  History of cholecystectomy  S/P CABG x 3: 2015, LIMA to the LAD, SVG to the Ramus, SVG to the RPDA (known to be occluded)    FAMILY HISTORY:  Family history of sudden cardiac death (SCD) (Sibling)  Family history of Parkinson's disease (Mother)  Family history of congenital heart disease (Father)  Family history of cerebrovascular accident (CVA) (Father)  Family history of heart disease    Home Medications:  amLODIPine 2.5 mg oral tablet: 1 tab(s) orally once a day (21 Aug 2020 15:58)  aspirin 81 mg oral tablet: 1 tab(s) orally once a day (21 Aug 2020 15:58)  clopidogrel 75 mg oral tablet: 1 tab(s) orally once a day (21 Aug 2020 15:58)  levothyroxine 125 mcg (0.125 mg) oral tablet: 1 tab(s) orally once a day (21 Aug 2020 15:58)  Prevacid 30 mg oral delayed release capsule: 1 cap(s) orally once a day (21 Aug 2020 15:58)    HPI: 76y/o female former smoker with history of CAD, 3V CABG (LIMA to the LAD, SVG to the Ramus, SVG to the RPDA [known to be occluded]), multiple PCI's (LM, pLAD, mLAD, D1, pLCX, dRCA, RPDA), CVA (9/10/2019, treated with tPA), B/L carotid artery stenosis c/o AGEE and exertional chest heaviness (CCS class 3, worse over the past 1 - 2 weeks, which she says is similar to prior to her CABG), fatigue (worse over the past 2 months), 3 pillow orthopnea, and PND. She has also noted occasional palpitations and lower extremity edema.    General: No fatigue, no fevers/chills  Respiratory: No dyspnea, no cough, no wheeze  CV: No chest pain, no palpitations  Abd: No nausea  Neuro: No headache, no dizziness    Allergies:   ·	Bactrim (Faint)  ·	Bananas (Angioedema)  ·	chocolate (Other)  ·	Cipro (Rash)  ·	codeine (Nausea)  ·	Flagyl (Faint)  ·	Macrobid (Faint)  ·	pantoprazole (Vomiting)  ·	reg gelatein TID (Unknown)  ·	sulfa drugs (Faint)    Objective:  Vital Signs Last 24 Hrs  T(C): 36.5 (21 Aug 2020 15:45), Max: 36.5 (21 Aug 2020 15:45)  T(F): 97.7 (21 Aug 2020 15:45), Max: 97.7 (21 Aug 2020 15:45)  HR: 61 (21 Aug 2020 15:45) (61 - 61)  BP: 195/81 (21 Aug 2020 15:45) (195/81 - 195/81)  RR: 16 (21 Aug 2020 15:45) (16 - 16)  SpO2: 94% (21 Aug 2020 15:45) (94% - 94%)    General: Awake, alert, speech clear, in no acute distress.  Chest: CTA, S1, S2, no murmurs.  Abdomen, Soft, nondistended  Right Groin: Soft, no bleeding, no hematoma.  Extremities: No edema, + distal pulses.                          14.0   7.25  )-----------( 248      ( 21 Aug 2020 15:49 )             41.5     08-21    137  |  99    |  15.0  ------------------------<  103  4.2   |  23.0  |  0.44    Ca    9.8      21 Aug 2020 15:49  Mg     2.2     08-21    PT/INR - ( 21 Aug 2020 15:49 )   PT: 12.5 sec;   INR: 1.08 ratio    PTT - ( 21 Aug 2020 15:49 )  PTT:30.7 sec

## 2020-08-21 NOTE — DISCHARGE NOTE PROVIDER - NSDCACTIVITY_GEN_ALL_CORE
Showering allowed/No heavy lifting/straining/Do not drive or operate machinery/Stairs allowed/Walking - Outdoors allowed/Walking - Indoors allowed/Do not make important decisions

## 2020-08-21 NOTE — DISCHARGE NOTE PROVIDER - HOSPITAL COURSE
76y/o female former smoker with history of CAD, 3V CABG (LIMA to the LAD, SVG to the Ramus, SVG to the RPDA [known to be occluded]), multiple PCI's (LM, pLAD, mLAD, D1, pLCX, dRCA, RPDA), CVA (9/10/2019, treated with tPA), B/L carotid artery stenosis c/o AGEE and exertional chest heaviness (CCS class 3, worse over the past 1 - 2 weeks, which she says is similar to prior to her CABG), fatigue (worse over the past 2 months), 3 pillow orthopnea, and PND. She has also noted occasional palpitations and lower extremity edema. She had a right and left heart catheterization which showed:    HEMODYNAMICS: Hemodynamic assessment demonstrates normal pulmonary capillary wedge pressure and normal pulmonary vascular resistance.    VENTRICLES: There were no left ventricular global or regional wall motion abnormalities. Global left ventricular function was normal. EF estimated was 70 %.    VALVES: MITRAL VALVE: The mitral valve exhibited no regurgitation.    CORONARY VESSELS: The coronary circulation is right dominant.    LM:      --  LM: There was a tubular 0 % stenosis at the site of a prior stent.    LAD:       --  Ostial LAD: There was a tubular 0 % stenosis at the site of a prior stent.    --  Proximal LAD: There was a diffuse 0 % stenosis at the site of a prior stent.    --  Mid LAD: There was a tubular 85 % stenosis proximal to the graft anastomosis.    --  D1: There was a tubular 0 % stenosis at the site of a prior stent.    CX:       --  Proximal circumflex: There was a tubular 0 % stenosis at the site of a prior stent.    --OM1: There was a tubular 90 % stenosis at the ostium of the vessel segment, proximal to the graft anastomosis.    RCA:       --  Distal RCA: There was a diffuse 20 % stenosis at the site of a prior stent.    --  RPDA: There was a tubular 25 % stenosis at the site of a prior stent.    GRAFTS:       --  Graft to the mid LAD: The graft was a LIMA. It was normal.    --  Graft to the 1st obtuse marginal: The graft was a saphenous vein graft from the aorta. There was a tubular 30 % stenosis at the proximal anastomosis.    --  Graft to the RPDA: The graft was a saphenous vein graft from the aorta. There was a 100 % stenosis at the proximal anastomosis.    Right Heart Pressures:         RA: 3         RV: 26/5         PA: 29/8/17         PCWP: 7         CO: 4.63         CI: 2.89         SVR: 24.39         PVR: 2.16        Plan:     Follow up with: Dr. Ladd    Medications:    Plavix 75 mg daily    Aspirin 81 mg daily    Lipitor 40 mg daily    Benicar 20 mg daily    Amlodipine 2.5 mg BID

## 2020-08-21 NOTE — PROGRESS NOTE ADULT - ASSESSMENT
Procedure: [facility]  Diagnosis:     1. Ambulate at: 900PM    2. If OK, discharge home at: 10:00PM    3. Follow up with: Dr. Ladd    4. Medications:  ·	Plavix 75 mg daily  ·	Aspirin 81 mg daily  ·	Lipitor 40 mg daily  ·	Benicar 20 mg daily  ·	Amlodipine 2.5 mg BID

## 2020-08-21 NOTE — DISCHARGE NOTE PROVIDER - NSDCCPCAREPLAN_GEN_ALL_CORE_FT
PRINCIPAL DISCHARGE DIAGNOSIS  Diagnosis: Hypertensive heart disease  Assessment and Plan of Treatment: Add Benicar

## 2020-08-21 NOTE — DISCHARGE NOTE NURSING/CASE MANAGEMENT/SOCIAL WORK - PATIENT PORTAL LINK FT
You can access the FollowMyHealth Patient Portal offered by [facility] by registering at the following website: [email or web address]. By joining [employer]’s FollowMyHealth portal, you will also be able to view your health information using other applications (apps) compatible with our system.

## 2020-10-08 PROBLEM — I25.10 ATHEROSCLEROTIC HEART DISEASE OF NATIVE CORONARY ARTERY WITHOUT ANGINA PECTORIS: Chronic | Status: ACTIVE | Noted: 2020-08-21

## 2020-10-08 PROBLEM — E78.5 HYPERLIPIDEMIA, UNSPECIFIED: Chronic | Status: ACTIVE | Noted: 2020-08-21

## 2020-10-16 ENCOUNTER — APPOINTMENT (OUTPATIENT)
Dept: ULTRASOUND IMAGING | Facility: CLINIC | Age: 75
End: 2020-10-16
Payer: MEDICARE

## 2020-10-16 ENCOUNTER — OUTPATIENT (OUTPATIENT)
Dept: OUTPATIENT SERVICES | Facility: HOSPITAL | Age: 75
LOS: 1 days | End: 2020-10-16
Payer: MEDICARE

## 2020-10-16 DIAGNOSIS — Z95.5 PRESENCE OF CORONARY ANGIOPLASTY IMPLANT AND GRAFT: Chronic | ICD-10-CM

## 2020-10-16 DIAGNOSIS — Z98.890 OTHER SPECIFIED POSTPROCEDURAL STATES: Chronic | ICD-10-CM

## 2020-10-16 DIAGNOSIS — Z95.1 PRESENCE OF AORTOCORONARY BYPASS GRAFT: Chronic | ICD-10-CM

## 2020-10-16 DIAGNOSIS — Z90.49 ACQUIRED ABSENCE OF OTHER SPECIFIED PARTS OF DIGESTIVE TRACT: Chronic | ICD-10-CM

## 2020-10-16 DIAGNOSIS — I65.29 OCCLUSION AND STENOSIS OF UNSPECIFIED CAROTID ARTERY: ICD-10-CM

## 2020-10-16 PROCEDURE — 93880 EXTRACRANIAL BILAT STUDY: CPT

## 2020-10-16 PROCEDURE — 93880 EXTRACRANIAL BILAT STUDY: CPT | Mod: 26

## 2020-10-29 ENCOUNTER — OUTPATIENT (OUTPATIENT)
Dept: OUTPATIENT SERVICES | Facility: HOSPITAL | Age: 75
LOS: 1 days | End: 2020-10-29
Payer: MEDICARE

## 2020-10-29 ENCOUNTER — APPOINTMENT (OUTPATIENT)
Dept: CT IMAGING | Facility: CLINIC | Age: 75
End: 2020-10-29
Payer: MEDICARE

## 2020-10-29 DIAGNOSIS — Z95.5 PRESENCE OF CORONARY ANGIOPLASTY IMPLANT AND GRAFT: Chronic | ICD-10-CM

## 2020-10-29 DIAGNOSIS — Z95.1 PRESENCE OF AORTOCORONARY BYPASS GRAFT: Chronic | ICD-10-CM

## 2020-10-29 DIAGNOSIS — Z98.890 OTHER SPECIFIED POSTPROCEDURAL STATES: Chronic | ICD-10-CM

## 2020-10-29 DIAGNOSIS — Z90.49 ACQUIRED ABSENCE OF OTHER SPECIFIED PARTS OF DIGESTIVE TRACT: Chronic | ICD-10-CM

## 2020-10-29 DIAGNOSIS — Z00.8 ENCOUNTER FOR OTHER GENERAL EXAMINATION: ICD-10-CM

## 2020-10-29 PROCEDURE — 70486 CT MAXILLOFACIAL W/O DYE: CPT | Mod: 26

## 2020-10-29 PROCEDURE — 70486 CT MAXILLOFACIAL W/O DYE: CPT

## 2020-11-11 ENCOUNTER — NON-APPOINTMENT (OUTPATIENT)
Age: 75
End: 2020-11-11

## 2020-12-23 ENCOUNTER — APPOINTMENT (OUTPATIENT)
Dept: MRI IMAGING | Facility: CLINIC | Age: 75
End: 2020-12-23
Payer: MEDICARE

## 2020-12-23 ENCOUNTER — OUTPATIENT (OUTPATIENT)
Dept: OUTPATIENT SERVICES | Facility: HOSPITAL | Age: 75
LOS: 1 days | End: 2020-12-23
Payer: MEDICARE

## 2020-12-23 DIAGNOSIS — Z95.5 PRESENCE OF CORONARY ANGIOPLASTY IMPLANT AND GRAFT: Chronic | ICD-10-CM

## 2020-12-23 DIAGNOSIS — Z00.8 ENCOUNTER FOR OTHER GENERAL EXAMINATION: ICD-10-CM

## 2020-12-23 DIAGNOSIS — Z95.1 PRESENCE OF AORTOCORONARY BYPASS GRAFT: Chronic | ICD-10-CM

## 2020-12-23 DIAGNOSIS — Z98.890 OTHER SPECIFIED POSTPROCEDURAL STATES: Chronic | ICD-10-CM

## 2020-12-23 DIAGNOSIS — R26.81 UNSTEADINESS ON FEET: ICD-10-CM

## 2020-12-23 DIAGNOSIS — Z90.49 ACQUIRED ABSENCE OF OTHER SPECIFIED PARTS OF DIGESTIVE TRACT: Chronic | ICD-10-CM

## 2020-12-23 DIAGNOSIS — M54.5 LOW BACK PAIN: ICD-10-CM

## 2020-12-23 PROCEDURE — 72146 MRI CHEST SPINE W/O DYE: CPT | Mod: 26

## 2020-12-23 PROCEDURE — 72148 MRI LUMBAR SPINE W/O DYE: CPT | Mod: 26

## 2020-12-23 PROCEDURE — 72146 MRI CHEST SPINE W/O DYE: CPT

## 2020-12-23 PROCEDURE — 72141 MRI NECK SPINE W/O DYE: CPT | Mod: 26

## 2020-12-23 PROCEDURE — 72141 MRI NECK SPINE W/O DYE: CPT

## 2020-12-23 PROCEDURE — 72148 MRI LUMBAR SPINE W/O DYE: CPT

## 2021-01-26 ENCOUNTER — APPOINTMENT (OUTPATIENT)
Dept: NEUROSURGERY | Facility: CLINIC | Age: 76
End: 2021-01-26
Payer: MEDICARE

## 2021-01-26 VITALS
HEART RATE: 62 BPM | DIASTOLIC BLOOD PRESSURE: 72 MMHG | BODY MASS INDEX: 20.83 KG/M2 | TEMPERATURE: 96.2 F | OXYGEN SATURATION: 97 % | HEIGHT: 65 IN | WEIGHT: 125 LBS | SYSTOLIC BLOOD PRESSURE: 145 MMHG

## 2021-01-26 DIAGNOSIS — M43.16 SPONDYLOLISTHESIS, LUMBAR REGION: ICD-10-CM

## 2021-01-26 PROCEDURE — 99213 OFFICE O/P EST LOW 20 MIN: CPT

## 2021-01-28 PROBLEM — M43.16 SPONDYLOLISTHESIS OF LUMBAR REGION: Status: ACTIVE | Noted: 2018-11-06

## 2021-01-28 NOTE — CONSULT LETTER
[Dear  ___] : Dear  [unfilled], [Courtesy Letter:] : I had the pleasure of seeing your patient, [unfilled], in my office today. [Sincerely,] : Sincerely, [FreeTextEntry2] : James Munson MD\par [facility] [address]\par [place] [id number] [FreeTextEntry1] : Mrs. Hsu is a very pleasant 75-year-old female patient who was seen in our office today in regards to hand pain/numbness as well as difficulty with balance.\par \par The patient endorses an insidious onset of hand numbness and pain radiating up into the forearm starting a few weeks ago.  The patient states that her numbness occurs fairly frequently, and the patient  has only had a few episodes of severe pain in her hand which radiates up the forearm.  The patient has been scheduled for an EMG/nerve conduction study but has not had an opportunity to obtain these studies yet.  In addition, the patient states that she has a tendency to list to the left side when she is walking which is becoming more noticeable.\par \par On examination, the patient is alert, oriented, and compliant with the exam.  The patient demonstrates 5/5 strength in her upper and lower extremities appropriate for age.  The patient has a positive Tinel's sign on the right, but not the left. The patient has been provided a wrist brace for her right arm which the patient is wearing loosely.\par \par The patient is accompanied with an MRI scan of the cervical, lumbar, and thoracic spine dated December 23, 2020.  The patient's cervical spine demonstrates multilevel degenerative changes with foraminal stenosis.  The patient's thoracic spine demonstrates very mild degenerative changes, and the patient's lumbar spine demonstrates a previously diagnosed L4/5 spondylolisthesis.\par \par Taken together, the patient has  a clinical history and physical examination most consistent with carpal tunnel syndrome.  I have encouraged patient to continue to follow-up with her electrophysiologic studies and review the findings with her ordering physician.  From a balance perspective, the patient's images do demonstrate a stable spondylolisthesis at L4/5 with a mild scoliosis.  I have recommended scoliosis films to evaluate the possibility of a coronal imbalance which will be treated with physical therapy.  The patient is clear that  surgery for any reason is not something she is willing to entertain at the moment.  At this time, I will be in contact with the patient once her images are complete so that we can help direct physical therapy for her balance issues as needed. [FreeTextEntry3] : Elliot Chao MD, PhD, FRCPSC \par Attending Neurosurgeon \par [facility] \par [address], [address] floor \[place] [address] \par Office: [phone] \par Fax: [phone]\par \par

## 2021-02-04 ENCOUNTER — OUTPATIENT (OUTPATIENT)
Dept: OUTPATIENT SERVICES | Facility: HOSPITAL | Age: 76
LOS: 1 days | End: 2021-02-04
Payer: MEDICARE

## 2021-02-04 ENCOUNTER — APPOINTMENT (OUTPATIENT)
Dept: NEUROLOGY | Facility: CLINIC | Age: 76
End: 2021-02-04

## 2021-02-04 ENCOUNTER — APPOINTMENT (OUTPATIENT)
Dept: RADIOLOGY | Facility: CLINIC | Age: 76
End: 2021-02-04
Payer: MEDICARE

## 2021-02-04 DIAGNOSIS — Z95.5 PRESENCE OF CORONARY ANGIOPLASTY IMPLANT AND GRAFT: Chronic | ICD-10-CM

## 2021-02-04 DIAGNOSIS — Z95.1 PRESENCE OF AORTOCORONARY BYPASS GRAFT: Chronic | ICD-10-CM

## 2021-02-04 DIAGNOSIS — Z98.890 OTHER SPECIFIED POSTPROCEDURAL STATES: Chronic | ICD-10-CM

## 2021-02-04 DIAGNOSIS — M43.13 SPONDYLOLISTHESIS, CERVICOTHORACIC REGION: ICD-10-CM

## 2021-02-04 DIAGNOSIS — Z90.49 ACQUIRED ABSENCE OF OTHER SPECIFIED PARTS OF DIGESTIVE TRACT: Chronic | ICD-10-CM

## 2021-02-04 DIAGNOSIS — M43.16 SPONDYLOLISTHESIS, LUMBAR REGION: ICD-10-CM

## 2021-02-04 PROCEDURE — 72082 X-RAY EXAM ENTIRE SPI 2/3 VW: CPT

## 2021-02-04 PROCEDURE — 72082 X-RAY EXAM ENTIRE SPI 2/3 VW: CPT | Mod: 26

## 2021-02-18 DIAGNOSIS — M41.9 SCOLIOSIS, UNSPECIFIED: ICD-10-CM

## 2021-02-25 ENCOUNTER — APPOINTMENT (OUTPATIENT)
Dept: NEUROLOGY | Facility: CLINIC | Age: 76
End: 2021-02-25

## 2021-04-20 ENCOUNTER — NON-APPOINTMENT (OUTPATIENT)
Age: 76
End: 2021-04-20

## 2021-05-06 ENCOUNTER — APPOINTMENT (OUTPATIENT)
Dept: GASTROENTEROLOGY | Facility: CLINIC | Age: 76
End: 2021-05-06
Payer: MEDICARE

## 2021-05-06 VITALS
HEART RATE: 63 BPM | OXYGEN SATURATION: 97 % | WEIGHT: 130 LBS | DIASTOLIC BLOOD PRESSURE: 76 MMHG | SYSTOLIC BLOOD PRESSURE: 148 MMHG | BODY MASS INDEX: 21.66 KG/M2 | RESPIRATION RATE: 14 BRPM | HEIGHT: 65 IN | TEMPERATURE: 97.4 F

## 2021-05-06 DIAGNOSIS — R10.13 EPIGASTRIC PAIN: ICD-10-CM

## 2021-05-06 DIAGNOSIS — R10.84 GENERALIZED ABDOMINAL PAIN: ICD-10-CM

## 2021-05-06 PROCEDURE — 99214 OFFICE O/P EST MOD 30 MIN: CPT

## 2021-05-06 RX ORDER — POLYETHYLENE GLYOCOL 3350, SODIUM CHLORIDE, SODIUM BICARBONATE AND POTASSIUM CHLORIDE 420; 11.2; 5.72; 1.48 G/4L; G/4L; G/4L; G/4L
420 POWDER, FOR SOLUTION NASOGASTRIC; ORAL
Qty: 1 | Refills: 0 | Status: DISCONTINUED | COMMUNITY
Start: 2020-06-30 | End: 2021-05-06

## 2021-05-06 RX ORDER — METOCLOPRAMIDE 5 MG/1
5 TABLET ORAL 4 TIMES DAILY
Qty: 120 | Refills: 2 | Status: DISCONTINUED | COMMUNITY
Start: 2020-06-03 | End: 2021-05-06

## 2021-05-06 NOTE — PHYSICAL EXAM
[General Appearance - Alert] : alert [General Appearance - In No Acute Distress] : in no acute distress [Sclera] : the sclera and conjunctiva were normal [PERRL With Normal Accommodation] : pupils were equal in size, round, and reactive to light [Extraocular Movements] : extraocular movements were intact [Outer Ear] : the ears and nose were normal in appearance [Neck Appearance] : the appearance of the neck was normal [Neck Cervical Mass (___cm)] : no neck mass was observed [Jugular Venous Distention Increased] : there was no jugular-venous distention [Thyroid Diffuse Enlargement] : the thyroid was not enlarged [Thyroid Nodule] : there were no palpable thyroid nodules [Auscultation Breath Sounds / Voice Sounds] : lungs were clear to auscultation bilaterally [Heart Rate And Rhythm] : heart rate was normal and rhythm regular [Heart Sounds] : normal S1 and S2 [Heart Sounds Gallop] : no gallops [Murmurs] : no murmurs [Heart Sounds Pericardial Friction Rub] : no pericardial rub [Edema] : there was no peripheral edema [Bowel Sounds] : normal bowel sounds [Abdomen Soft] : soft [] : no hepato-splenomegaly [Abdomen Mass (___ Cm)] : no abdominal mass palpated [Epigastric] : in the epigastric area [RLQ] : in the right lower quadrant [Cervical Lymph Nodes Enlarged Posterior Bilaterally] : posterior cervical [Cervical Lymph Nodes Enlarged Anterior Bilaterally] : anterior cervical [Supraclavicular Lymph Nodes Enlarged Bilaterally] : supraclavicular [Nail Clubbing] : no clubbing  or cyanosis of the fingernails [Skin Color & Pigmentation] : normal skin color and pigmentation [Skin Turgor] : normal skin turgor [No Focal Deficits] : no focal deficits [Oriented To Time, Place, And Person] : oriented to person, place, and time [Impaired Insight] : insight and judgment were intact [Affect] : the affect was normal

## 2021-05-06 NOTE — ASSESSMENT
[FreeTextEntry1] : I believe that much of her symptoms are secondary to IBS with constipation.  Because this is a change in her bowel habits that is accompanied by occasional rectal bleeding, she needs to undergo a diagnostic colonoscopy.  She will need cardiac clearance and to hold the clopidogrel for 5 days prior to the procedure.  I have advised that she start taking MiraLAX 17 g once daily in the meantime.

## 2021-05-06 NOTE — HISTORY OF PRESENT ILLNESS
[de-identified] : Patient presents again with same complaints of postprandial epigastric pain and nausea that has not been relieved by any of the medications prescribed.  She continues to experience stools that are like marbles that is intermittent with occasional diarrhea and passage of mucus.

## 2021-05-10 ENCOUNTER — APPOINTMENT (OUTPATIENT)
Dept: FAMILY MEDICINE | Facility: CLINIC | Age: 76
End: 2021-05-10
Payer: MEDICARE

## 2021-05-10 VITALS
HEIGHT: 65 IN | BODY MASS INDEX: 20.83 KG/M2 | DIASTOLIC BLOOD PRESSURE: 74 MMHG | TEMPERATURE: 97 F | SYSTOLIC BLOOD PRESSURE: 130 MMHG | OXYGEN SATURATION: 97 % | HEART RATE: 72 BPM | WEIGHT: 125 LBS

## 2021-05-10 DIAGNOSIS — J44.9 CHRONIC OBSTRUCTIVE PULMONARY DISEASE, UNSPECIFIED: ICD-10-CM

## 2021-05-10 DIAGNOSIS — I73.9 PERIPHERAL VASCULAR DISEASE, UNSPECIFIED: ICD-10-CM

## 2021-05-10 DIAGNOSIS — Z13.31 ENCOUNTER FOR SCREENING FOR DEPRESSION: ICD-10-CM

## 2021-05-10 DIAGNOSIS — Z87.19 PERSONAL HISTORY OF OTHER DISEASES OF THE DIGESTIVE SYSTEM: ICD-10-CM

## 2021-05-10 DIAGNOSIS — I10 ESSENTIAL (PRIMARY) HYPERTENSION: ICD-10-CM

## 2021-05-10 DIAGNOSIS — E03.9 HYPOTHYROIDISM, UNSPECIFIED: ICD-10-CM

## 2021-05-10 DIAGNOSIS — E46 UNSPECIFIED PROTEIN-CALORIE MALNUTRITION: ICD-10-CM

## 2021-05-10 PROCEDURE — G0444 DEPRESSION SCREEN ANNUAL: CPT | Mod: 59

## 2021-05-10 PROCEDURE — 99204 OFFICE O/P NEW MOD 45 MIN: CPT | Mod: 25

## 2021-05-10 RX ORDER — LEVOTHYROXINE SODIUM 88 UG/1
88 TABLET ORAL
Qty: 90 | Refills: 0 | Status: ACTIVE | COMMUNITY
Start: 2021-03-24

## 2021-05-10 RX ORDER — LEVOTHYROXINE SODIUM 100 UG/1
100 TABLET ORAL
Refills: 0 | Status: COMPLETED | COMMUNITY
End: 2021-05-10

## 2021-05-10 RX ORDER — ROSUVASTATIN CALCIUM 10 MG/1
10 TABLET, FILM COATED ORAL
Qty: 90 | Refills: 0 | Status: COMPLETED | COMMUNITY
Start: 2021-04-15

## 2021-05-10 NOTE — PLAN
[FreeTextEntry1] : HLD\par - f/u lipid panel\par \par HTN\par - controlled\par - cont current meds\par \par Hypothyroidism\par - f/u thyroid labs\par - follows with endo as well\par \par PVD\par - stable\par \par Claudication\par - has f/u with cardio\par - stable\par \par IBS with bleeding\par - GI f/u\par - scheduled for colonoscopy\par \par f/u pending labs\par \par

## 2021-05-10 NOTE — HISTORY OF PRESENT ILLNESS
[FreeTextEntry1] : establish care, fatigue [de-identified] : 76 year old female with pmhx of CAD s/p CABG x3 and stents x6, HLD, HTN, Hypothyroidism, PVD presents to establish care. She complains to today of severe fatigue. Since January was having muscle aches and fatigue. It was suggested to switch lipitor. She saw her primary who referred her to cardiology. Cardiologist did angiogram and was told it was not her heart. She is still with severe fatigue although the muscle aches have resolved. She has f/u to establish care with a new cardiologist this week. \par States she has a hx of myositis, EBV and fibromyalgia from many years ago. Does not follow with a rheum

## 2021-05-10 NOTE — PHYSICAL EXAM
[No Acute Distress] : no acute distress [Well-Appearing] : well-appearing [Normal Sclera/Conjunctiva] : normal sclera/conjunctiva [PERRL] : pupils equal round and reactive to light [Normal Outer Ear/Nose] : the outer ears and nose were normal in appearance [No Respiratory Distress] : no respiratory distress  [No Accessory Muscle Use] : no accessory muscle use [Clear to Auscultation] : lungs were clear to auscultation bilaterally [Normal Rate] : normal rate  [Regular Rhythm] : with a regular rhythm [Soft] : abdomen soft [Non Tender] : non-tender [Non-distended] : non-distended [Normal Bowel Sounds] : normal bowel sounds [No Rash] : no rash [No Focal Deficits] : no focal deficits [Normal Affect] : the affect was normal [Normal Insight/Judgement] : insight and judgment were intact

## 2021-05-11 LAB
25(OH)D3 SERPL-MCNC: 39 NG/ML
ALBUMIN SERPL ELPH-MCNC: 4.2 G/DL
ALP BLD-CCNC: 108 U/L
ALT SERPL-CCNC: 21 U/L
ANION GAP SERPL CALC-SCNC: 11 MMOL/L
AST SERPL-CCNC: 21 U/L
B BURGDOR AB SER-IMP: NEGATIVE
B BURGDOR IGG+IGM SER QL: 0.09 INDEX
BASOPHILS # BLD AUTO: 0.03 K/UL
BASOPHILS NFR BLD AUTO: 0.5 %
BILIRUB SERPL-MCNC: 0.3 MG/DL
BUN SERPL-MCNC: 16 MG/DL
CALCIUM SERPL-MCNC: 9.9 MG/DL
CHLORIDE SERPL-SCNC: 104 MMOL/L
CHOLEST SERPL-MCNC: 171 MG/DL
CK SERPL-CCNC: 329 U/L
CO2 SERPL-SCNC: 26 MMOL/L
CREAT SERPL-MCNC: 0.57 MG/DL
CRP SERPL-MCNC: <3 MG/L
ENA SCL70 IGG SER IA-ACNC: <0.2 AL
ENA SS-A AB SER IA-ACNC: <0.2 AL
ENA SS-B AB SER IA-ACNC: <0.2 AL
EOSINOPHIL # BLD AUTO: 0.11 K/UL
EOSINOPHIL NFR BLD AUTO: 1.8 %
ERYTHROCYTE [SEDIMENTATION RATE] IN BLOOD BY WESTERGREN METHOD: 36 MM/HR
ESTIMATED AVERAGE GLUCOSE: 111 MG/DL
FERRITIN SERPL-MCNC: 176 NG/ML
FOLATE SERPL-MCNC: 18.1 NG/ML
GLUCOSE SERPL-MCNC: 88 MG/DL
HBA1C MFR BLD HPLC: 5.5 %
HCT VFR BLD CALC: 41.4 %
HDLC SERPL-MCNC: 49 MG/DL
HETEROPH AB SER QL: NEGATIVE
HGB BLD-MCNC: 13.4 G/DL
IMM GRANULOCYTES NFR BLD AUTO: 0.3 %
IRON SATN MFR SERPL: 23 %
IRON SERPL-MCNC: 74 UG/DL
LDLC SERPL CALC-MCNC: 101 MG/DL
LYMPHOCYTES # BLD AUTO: 1.56 K/UL
LYMPHOCYTES NFR BLD AUTO: 25.6 %
MAN DIFF?: NORMAL
MCHC RBC-ENTMCNC: 31.8 PG
MCHC RBC-ENTMCNC: 32.4 GM/DL
MCV RBC AUTO: 98.3 FL
MONOCYTES # BLD AUTO: 0.53 K/UL
MONOCYTES NFR BLD AUTO: 8.7 %
NEUTROPHILS # BLD AUTO: 3.84 K/UL
NEUTROPHILS NFR BLD AUTO: 63.1 %
NONHDLC SERPL-MCNC: 122 MG/DL
PLATELET # BLD AUTO: 254 K/UL
POTASSIUM SERPL-SCNC: 4.1 MMOL/L
PROT SERPL-MCNC: 7.1 G/DL
RBC # BLD: 4.21 M/UL
RBC # FLD: 13 %
RHEUMATOID FACT SER QL: <10 IU/ML
SODIUM SERPL-SCNC: 142 MMOL/L
T3FREE SERPL-MCNC: 2.4 PG/ML
T4 FREE SERPL-MCNC: 1.3 NG/DL
TIBC SERPL-MCNC: 327 UG/DL
TRIGL SERPL-MCNC: 106 MG/DL
TSH SERPL-ACNC: 3.31 UIU/ML
UIBC SERPL-MCNC: 253 UG/DL
VIT B12 SERPL-MCNC: 725 PG/ML
WBC # FLD AUTO: 6.09 K/UL

## 2021-05-13 ENCOUNTER — APPOINTMENT (OUTPATIENT)
Dept: CARDIOLOGY | Facility: CLINIC | Age: 76
End: 2021-05-13

## 2021-05-13 LAB
ANA SER IF-ACNC: NEGATIVE
ANAPLASMA PHAGOCYTOPHILIA IGG ANTIBODIES: NEGATIVE
ANAPLASMA PHAGOCYTOPHILIA IGM ANTIBODIES: NEGATIVE
BABESIA ANTIBODIES, IGG: NORMAL
BABESIA ANTIBODIES, IGM: NORMAL
CCP AB SER IA-ACNC: <8 UNITS
DSDNA AB SER-ACNC: <12 IU/ML
EHRLICIA CHAFFEENIS IGG ANTIBODIES: NEGATIVE
EHRLICIA CHAFFEENIS IGM ANTIBODIES: NEGATIVE
RF+CCP IGG SER-IMP: NEGATIVE

## 2021-05-14 ENCOUNTER — NON-APPOINTMENT (OUTPATIENT)
Age: 76
End: 2021-05-14

## 2021-05-14 ENCOUNTER — APPOINTMENT (OUTPATIENT)
Dept: CARDIOLOGY | Facility: CLINIC | Age: 76
End: 2021-05-14
Payer: MEDICARE

## 2021-05-14 VITALS
SYSTOLIC BLOOD PRESSURE: 170 MMHG | HEIGHT: 65 IN | HEART RATE: 74 BPM | OXYGEN SATURATION: 94 % | BODY MASS INDEX: 20.83 KG/M2 | WEIGHT: 125 LBS | DIASTOLIC BLOOD PRESSURE: 82 MMHG

## 2021-05-14 DIAGNOSIS — E78.00 PURE HYPERCHOLESTEROLEMIA, UNSPECIFIED: ICD-10-CM

## 2021-05-14 PROCEDURE — 93000 ELECTROCARDIOGRAM COMPLETE: CPT

## 2021-05-14 PROCEDURE — 99204 OFFICE O/P NEW MOD 45 MIN: CPT

## 2021-05-14 RX ORDER — PREDNISOLONE ACETATE 10 MG/ML
1 SUSPENSION/ DROPS OPHTHALMIC
Qty: 5 | Refills: 0 | Status: ACTIVE | COMMUNITY
Start: 2021-05-07

## 2021-05-14 RX ORDER — ROSUVASTATIN CALCIUM 20 MG/1
20 TABLET, FILM COATED ORAL
Qty: 90 | Refills: 3 | Status: ACTIVE | COMMUNITY
Start: 2021-05-14 | End: 1900-01-01

## 2021-05-14 RX ORDER — ATORVASTATIN CALCIUM 40 MG/1
40 TABLET, FILM COATED ORAL
Refills: 0 | Status: DISCONTINUED | COMMUNITY
End: 2021-05-14

## 2021-05-14 NOTE — REASON FOR VISIT
[Symptom and Test Evaluation] : symptom and test evaluation [Hyperlipidemia] : hyperlipidemia [Hypertension] : hypertension [Coronary Artery Disease] : coronary artery disease

## 2021-05-21 ENCOUNTER — APPOINTMENT (OUTPATIENT)
Dept: NEUROLOGY | Facility: CLINIC | Age: 76
End: 2021-05-21
Payer: MEDICARE

## 2021-05-21 VITALS
HEART RATE: 72 BPM | TEMPERATURE: 97.8 F | BODY MASS INDEX: 20.83 KG/M2 | HEIGHT: 65 IN | WEIGHT: 125 LBS | SYSTOLIC BLOOD PRESSURE: 120 MMHG | DIASTOLIC BLOOD PRESSURE: 74 MMHG

## 2021-05-21 DIAGNOSIS — R51.9 HEADACHE, UNSPECIFIED: ICD-10-CM

## 2021-05-21 DIAGNOSIS — M54.2 CERVICALGIA: ICD-10-CM

## 2021-05-21 PROCEDURE — 99214 OFFICE O/P EST MOD 30 MIN: CPT

## 2021-05-21 NOTE — PHYSICAL EXAM
[FreeTextEntry1] : Examination:\par Constitutional: normal, no apparent distress\par Eyes: normal conjunctiva b/l, no ptosis, visual fields full\par Respiratory: no respiratory distress, normal effort, normal auscultation\par Cardiovascular: normal rate, rhythm, no murmurs\par Neck: supple, no masses\par Vascular: carotids normal\par Skin: normal color, no rashes\par Psych: normal mood, affect\par \par Neurological:\par Memory: normal memory, oriented to person, place, time\par Language intact/no aphasia\par Cranial Nerves: II-XII intact, Pupils equally round and reactive to light, ocular muscles/movements intact, no ptosis, no facial weakness, tongue protrudes normally in the midline, \par Motor: normal tone, no pronator drift, full strength in all four extremities in the proximal and distal muscle groups\par Coordination: Fine motor movements intact, rapid alternating movements intact, finger to nose intact bilaterally\par Sensory: intact to light touch, vibration, joint position sense\par DTRs: symmetric, 1+ in b/l triceps, 1+ in b/l biceps, 1+ in b/l brachioradialis, 1+ in bilateral patellars, trace in bilateral Achilles, Babinskis negative bilaterally\par Gait: narrow based, steady\par \par

## 2021-05-21 NOTE — DISCUSSION/SUMMARY
[FreeTextEntry1] : Ms. Hsu is a 76 year old woman with multiple medical problems presenting with increased pain and paresthesias in her right upper extremity as well as posterior headaches radiating up the right side of her head.\par \par Right upper extremity pain\par -Likely has carpal tunnel syndrome\par -May also have an element of ulnar neuropathy and a cervical radiculopathy\par -Arthritis may also be contributing to her symptoms.\par -NCV/EMG is scheduled for next month which will help clarify the diagnosis and the severity.\par -If she is found to have significant median neuropathy and cannot be cleared for surgery, can consider a steroid injection.\par -I suggested a trial of gabapentin to help with symptoms but she is refusing.\par -Can try topical agents such as lidocaine/Aspercreme and Voltaren gel\par -Physical therapy]\par \par Headaches\par -Possible occipital neuralgia\par -May also have component of cervicogenic headaches\par -MRI head\par -Advised stopping daily use of Tylenol which may be causing some rebound headache.\par -Physical therapy\par -Can consider occipital nerve blocks\par \par f/u after EMG, sooner if needed.

## 2021-05-21 NOTE — HISTORY OF PRESENT ILLNESS
[FreeTextEntry1] : Ms. Hsu is here today for neurology evaluation.\par She reports a history of carpal tunnel syndrome on the right side.\par She was given a brace to wear on the right side.\par She is recently having more difficulty.\par She reports pain that radiates up her arm and difficulty using her hand due to pain. She has difficulty writing and texting. Symptoms are worse in her 1st and 2nd digits. \par She has numbness/tingling which make it difficult to use the hand.\par \par She is scheduled for an EMG on 6/10/21.\par She is concerned that she will not be able to have surgery because of cardiac risk factors.\par She has a history of a stroke in 2019.\par \par She is also has a pain at the base of her head and radiate up the right side of her head.  \par She describes it as a sharp, electrical pain.\par This has been going on for about one month. She has been taking daily Tylenol.

## 2021-05-25 ENCOUNTER — APPOINTMENT (OUTPATIENT)
Dept: CARDIOLOGY | Facility: CLINIC | Age: 76
End: 2021-05-25
Payer: MEDICARE

## 2021-05-25 PROCEDURE — 93306 TTE W/DOPPLER COMPLETE: CPT

## 2021-05-25 PROCEDURE — 93880 EXTRACRANIAL BILAT STUDY: CPT

## 2021-05-28 NOTE — DISCUSSION/SUMMARY
[Coronary Artery Disease] : coronary artery disease [Hyperlipidemia] : hyperlipidemia [Stable] : stable [Hypertension] : hypertension [Not Responding to Treatment] : not responding to treatment [Patient] : the patient [FreeTextEntry1] : Pt will have an Echo and carotid doppler. She will discontinue Lipitor and add Crestor 20 mg daily to achieve a goal LDL of 70 or less. Pt will return in a month with her B/P cuff and diary. \par \par Addendum May 28 2021: Pt anticipating colonoscopy. Based on exam on May 14 and review of records including cath report of August 2020, there are no cardiac contraindications for planned colonoscopy. Pt should discontinue Plavix for 5 days prior to procedure, substituting ASA 81 mg daily. Plavix may be restarted following procedure and ASA may be discontinued at that time.

## 2021-05-28 NOTE — HISTORY OF PRESENT ILLNESS
[FreeTextEntry1] : 77 yo female presents for cardiac evaluation. Pt has felt fatigued >6 months. Pt feels worse as time passes. She complains that she is not sleeping because she urinates frequently. She was told to stay hydrated. She denies chest pain. Recent cardiac cath at [facility] results were reviewed. Pt's blood pressure was elevated today but she reports that her blood pressure is normal at home. Pt has a history of CABG 2015. She has had 6 stents since CABG.

## 2021-06-02 ENCOUNTER — OUTPATIENT (OUTPATIENT)
Dept: OUTPATIENT SERVICES | Facility: HOSPITAL | Age: 76
LOS: 1 days | End: 2021-06-02
Payer: MEDICARE

## 2021-06-02 ENCOUNTER — APPOINTMENT (OUTPATIENT)
Dept: MRI IMAGING | Facility: CLINIC | Age: 76
End: 2021-06-02
Payer: MEDICARE

## 2021-06-02 ENCOUNTER — NON-APPOINTMENT (OUTPATIENT)
Age: 76
End: 2021-06-02

## 2021-06-02 DIAGNOSIS — Z95.1 PRESENCE OF AORTOCORONARY BYPASS GRAFT: Chronic | ICD-10-CM

## 2021-06-02 DIAGNOSIS — Z95.5 PRESENCE OF CORONARY ANGIOPLASTY IMPLANT AND GRAFT: Chronic | ICD-10-CM

## 2021-06-02 DIAGNOSIS — Z98.890 OTHER SPECIFIED POSTPROCEDURAL STATES: Chronic | ICD-10-CM

## 2021-06-02 DIAGNOSIS — R51.9 HEADACHE, UNSPECIFIED: ICD-10-CM

## 2021-06-02 DIAGNOSIS — Z90.49 ACQUIRED ABSENCE OF OTHER SPECIFIED PARTS OF DIGESTIVE TRACT: Chronic | ICD-10-CM

## 2021-06-02 PROCEDURE — 70551 MRI BRAIN STEM W/O DYE: CPT | Mod: 26,MH

## 2021-06-02 PROCEDURE — 70551 MRI BRAIN STEM W/O DYE: CPT

## 2021-06-04 NOTE — DIETITIAN INITIAL EVALUATION ADULT. - RD TO REMAIN AVAILABLE
Mailed RX to patient.   
Patient was in on 4/19 for a complete eye exam with Dr. Mcdonald. The patient is wondering if there was a refraction done at that time and if so if she could have that mailed to her. The patient would also like to know if and when this is ready. If there is not a prescription in the system the patient would like to make an appointment for a refraction only.    Please advise.  
yes

## 2021-06-10 ENCOUNTER — APPOINTMENT (OUTPATIENT)
Dept: NEUROLOGY | Facility: CLINIC | Age: 76
End: 2021-06-10
Payer: MEDICARE

## 2021-06-10 DIAGNOSIS — M54.2 CERVICALGIA: ICD-10-CM

## 2021-06-10 DIAGNOSIS — R20.2 PARESTHESIA OF SKIN: ICD-10-CM

## 2021-06-10 PROCEDURE — 95885 MUSC TST DONE W/NERV TST LIM: CPT

## 2021-06-10 PROCEDURE — 95911 NRV CNDJ TEST 9-10 STUDIES: CPT

## 2021-06-10 NOTE — PROCEDURE
[FreeTextEntry1] : Patient had EMG/NCV studies of both upper extremities performed today. Paraspinal needle EMG is not done due to time constraints.\par The electro diagnostic study of both upper extremities revealed findings indicative of\par 1. 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 Superimposed chronic bilateral C5-6 radiculopathy cannot be ruled out. Clinical correlation recommended.

## 2021-06-10 NOTE — REASON FOR VISIT
[Procedure: _________] : a [unfilled] procedure visit [FreeTextEntry1] : Pt coming for EMG/NCV studies of BUE

## 2021-06-10 NOTE — HISTORY OF PRESENT ILLNESS
[FreeTextEntry1] : She is 76-year-old patient coming here for two-month history of right upper extremity pain and paresthesias right more than left upper extremity chronic cervical pain.\par Patient was seen and evaluated by Dr. Segura and recommended EMG/NCV studies of both upper extremities.\par Patient states that she had this same study done several years ago was diagnosed to have carpal tunnel syndrome but symptoms resolve on its own when she wore the brace. Currently she is wearing the brace is not helping her.

## 2021-06-21 ENCOUNTER — APPOINTMENT (OUTPATIENT)
Dept: DISASTER EMERGENCY | Facility: CLINIC | Age: 76
End: 2021-06-21

## 2021-06-23 ENCOUNTER — TRANSCRIPTION ENCOUNTER (OUTPATIENT)
Age: 76
End: 2021-06-23

## 2021-06-24 ENCOUNTER — APPOINTMENT (OUTPATIENT)
Dept: GASTROENTEROLOGY | Facility: GI CENTER | Age: 76
End: 2021-06-24
Payer: MEDICARE

## 2021-06-24 ENCOUNTER — OUTPATIENT (OUTPATIENT)
Dept: OUTPATIENT SERVICES | Facility: HOSPITAL | Age: 76
LOS: 1 days | End: 2021-06-24
Payer: MEDICARE

## 2021-06-24 ENCOUNTER — RESULT REVIEW (OUTPATIENT)
Age: 76
End: 2021-06-24

## 2021-06-24 DIAGNOSIS — Z95.1 PRESENCE OF AORTOCORONARY BYPASS GRAFT: Chronic | ICD-10-CM

## 2021-06-24 DIAGNOSIS — K57.30 DIVERTICULOSIS OF LARGE INTESTINE W/OUT PERFORATION OR ABSCESS W/OUT BLEEDING: ICD-10-CM

## 2021-06-24 DIAGNOSIS — Z95.5 PRESENCE OF CORONARY ANGIOPLASTY IMPLANT AND GRAFT: Chronic | ICD-10-CM

## 2021-06-24 DIAGNOSIS — Z98.890 OTHER SPECIFIED POSTPROCEDURAL STATES: Chronic | ICD-10-CM

## 2021-06-24 DIAGNOSIS — K63.5 POLYP OF COLON: ICD-10-CM

## 2021-06-24 DIAGNOSIS — R19.4 CHANGE IN BOWEL HABIT: ICD-10-CM

## 2021-06-24 DIAGNOSIS — K64.4 RESIDUAL HEMORRHOIDAL SKIN TAGS: ICD-10-CM

## 2021-06-24 DIAGNOSIS — Z90.49 ACQUIRED ABSENCE OF OTHER SPECIFIED PARTS OF DIGESTIVE TRACT: Chronic | ICD-10-CM

## 2021-06-24 PROCEDURE — 88305 TISSUE EXAM BY PATHOLOGIST: CPT

## 2021-06-24 PROCEDURE — 45385 COLONOSCOPY W/LESION REMOVAL: CPT

## 2021-06-24 PROCEDURE — 88305 TISSUE EXAM BY PATHOLOGIST: CPT | Mod: 26

## 2021-06-24 RX ORDER — SODIUM SULFATE, POTASSIUM SULFATE, MAGNESIUM SULFATE 17.5; 3.13; 1.6 G/ML; G/ML; G/ML
17.5-3.13-1.6 SOLUTION, CONCENTRATE ORAL
Qty: 1 | Refills: 0 | Status: COMPLETED | COMMUNITY
Start: 2021-05-21 | End: 2021-06-24

## 2021-06-24 NOTE — PHYSICAL EXAM
[General Appearance - Alert] : alert [General Appearance - In No Acute Distress] : in no acute distress [Sclera] : the sclera and conjunctiva were normal [PERRL With Normal Accommodation] : pupils were equal in size, round, and reactive to light [Extraocular Movements] : extraocular movements were intact [Outer Ear] : the ears and nose were normal in appearance [Neck Appearance] : the appearance of the neck was normal [Neck Cervical Mass (___cm)] : no neck mass was observed [Jugular Venous Distention Increased] : there was no jugular-venous distention [Thyroid Diffuse Enlargement] : the thyroid was not enlarged [Thyroid Nodule] : there were no palpable thyroid nodules [Auscultation Breath Sounds / Voice Sounds] : lungs were clear to auscultation bilaterally [Heart Rate And Rhythm] : heart rate was normal and rhythm regular [Heart Sounds] : normal S1 and S2 [Heart Sounds Gallop] : no gallops [Murmurs] : no murmurs [Heart Sounds Pericardial Friction Rub] : no pericardial rub [Edema] : there was no peripheral edema [Bowel Sounds] : normal bowel sounds [Abdomen Soft] : soft [] : no hepato-splenomegaly [Abdomen Mass (___ Cm)] : no abdominal mass palpated [Epigastric] : in the epigastric area [RLQ] : in the right lower quadrant [Cervical Lymph Nodes Enlarged Posterior Bilaterally] : posterior cervical [Cervical Lymph Nodes Enlarged Anterior Bilaterally] : anterior cervical [Supraclavicular Lymph Nodes Enlarged Bilaterally] : supraclavicular [Nail Clubbing] : no clubbing  or cyanosis of the fingernails [Skin Color & Pigmentation] : normal skin color and pigmentation [No Focal Deficits] : no focal deficits [Skin Turgor] : normal skin turgor [Oriented To Time, Place, And Person] : oriented to person, place, and time [Impaired Insight] : insight and judgment were intact [Affect] : the affect was normal

## 2021-06-28 LAB — SURGICAL PATHOLOGY STUDY: SIGNIFICANT CHANGE UP

## 2021-07-01 ENCOUNTER — TRANSCRIPTION ENCOUNTER (OUTPATIENT)
Age: 76
End: 2021-07-01

## 2021-07-01 ENCOUNTER — NON-APPOINTMENT (OUTPATIENT)
Age: 76
End: 2021-07-01

## 2021-07-01 ENCOUNTER — APPOINTMENT (OUTPATIENT)
Dept: CARDIOLOGY | Facility: CLINIC | Age: 76
End: 2021-07-01
Payer: MEDICARE

## 2021-07-01 VITALS
SYSTOLIC BLOOD PRESSURE: 130 MMHG | HEIGHT: 65 IN | BODY MASS INDEX: 21.66 KG/M2 | DIASTOLIC BLOOD PRESSURE: 70 MMHG | OXYGEN SATURATION: 97 % | HEART RATE: 65 BPM | WEIGHT: 130 LBS

## 2021-07-01 PROCEDURE — 99214 OFFICE O/P EST MOD 30 MIN: CPT

## 2021-07-01 PROCEDURE — 93000 ELECTROCARDIOGRAM COMPLETE: CPT

## 2021-07-01 RX ORDER — ELECTROLYTES/DEXTROSE
100 SOLUTION, ORAL ORAL DAILY
Refills: 0 | Status: ACTIVE | COMMUNITY

## 2021-07-01 RX ORDER — POLYETHYLENE GLYCOL 3350 17 G/17G
17 POWDER, FOR SOLUTION ORAL DAILY
Qty: 1 | Refills: 11 | Status: DISCONTINUED | COMMUNITY
Start: 2021-05-06 | End: 2021-07-01

## 2021-07-01 NOTE — HISTORY OF PRESENT ILLNESS
[FreeTextEntry1] : 77 yo female presents for review of testing results. Pt feels improved with change of fluid intake to earlier in the day. Pt is sleeping improved. Pt feels less fatigued during the day. She has not had chest discomfort lately. Cardiac cath from [place] was reviewed 8/21/20. Pt was recently evaluated for carpal tunnel surgery. She plans to have it done soon.

## 2021-07-01 NOTE — REASON FOR VISIT
[Symptom and Test Evaluation] : symptom and test evaluation [Carotid, Aortic and Peripheral Vascular Disease] : carotid, aortic and peripheral vascular disease

## 2021-07-01 NOTE — DISCUSSION/SUMMARY
[Coronary Artery Disease] : coronary artery disease [Stable] : stable [Patient] : the patient [FreeTextEntry1] : Pt will continue current medications. Pt will follow up in 4 months. There are no cardiac contraindications for hand surgery.

## 2021-07-06 ENCOUNTER — APPOINTMENT (OUTPATIENT)
Dept: NEUROSURGERY | Facility: CLINIC | Age: 76
End: 2021-07-06
Payer: MEDICARE

## 2021-07-06 VITALS
HEART RATE: 64 BPM | DIASTOLIC BLOOD PRESSURE: 75 MMHG | OXYGEN SATURATION: 97 % | HEIGHT: 65 IN | SYSTOLIC BLOOD PRESSURE: 154 MMHG | WEIGHT: 130 LBS | BODY MASS INDEX: 21.66 KG/M2 | TEMPERATURE: 97.6 F

## 2021-07-06 PROCEDURE — 99212 OFFICE O/P EST SF 10 MIN: CPT

## 2021-07-06 NOTE — CONSULT LETTER
[Dear  ___] : Dear  [unfilled], [Courtesy Letter:] : I had the pleasure of seeing your patient, [unfilled], in my office today. [Sincerely,] : Sincerely, [FreeTextEntry2] : James Munson MD\par [address]\par [place] [address] \par  [FreeTextEntry1] : Mrs. Hsu is a very pleasant 76-year-old female patient who was seen in our office in regards to right sided hand pain/numbness. \par \par Briefly, the patient has been complaining of hand numbness and pain radiating up the forearm since January 2021.  Patient symptoms have worsened slightly since her last visit.  The patient recently underwent EMG/nerve conduction studies which were reviewed today.  The patient continues to have balance issues which are ongoing.\par \par On examination, the patient has a positive Tinel's sign on the right side but otherwise demonstrates 5/5 strength in the upper and lower extremities appropriate for age.\par \par The patient is accompanied with an EMG/nerve conduction study performed on Milagro 10, 2021 which demonstrates severe carpal tunnel syndrome. The patient is accompanied with standing scoliosis films dated February 4, 2021.  These images demonstrate a sagittal vertical alignment of 6.5 cm from C7-S1.  Despite a mild degenerative scoliosis, the patient's coronal vertical alignment is normal.\par \par Taken together, the patient has a clinical history and electrophysiologic studies most consistent with carpal tunnel syndrome.  The patient will be sent for evaluation from hand specialist and possibly surgery.  From a balance issue perspective, I explained to the patient that her coronal balance is quite good although she is slightly tilted forward on her sagittal scoliosis views.  As such, I recommended postural training and gait and balance training at this time.  The patient will be following up with us on an as-needed basis and we look forward to seeing the patient back should the need ever arise. [FreeTextEntry3] : Elliot Chao MD, PhD, FRCPSC \par Attending Neurosurgeon \par [facility] \par [address], [address]nd floor \[place] [address] \par Office: [phone] \par Fax: [phone]\par \par

## 2021-07-22 ENCOUNTER — APPOINTMENT (OUTPATIENT)
Dept: NEUROLOGY | Facility: CLINIC | Age: 76
End: 2021-07-22
Payer: MEDICARE

## 2021-07-22 VITALS
SYSTOLIC BLOOD PRESSURE: 129 MMHG | HEART RATE: 60 BPM | TEMPERATURE: 97.2 F | HEIGHT: 65 IN | BODY MASS INDEX: 21.66 KG/M2 | WEIGHT: 130 LBS | DIASTOLIC BLOOD PRESSURE: 71 MMHG

## 2021-07-22 DIAGNOSIS — M54.81 OCCIPITAL NEURALGIA: ICD-10-CM

## 2021-07-22 DIAGNOSIS — M54.12 RADICULOPATHY, CERVICAL REGION: ICD-10-CM

## 2021-07-22 PROCEDURE — 99213 OFFICE O/P EST LOW 20 MIN: CPT

## 2021-07-22 RX ORDER — PREDNISONE 5 MG/1
5 TABLET ORAL
Qty: 5 | Refills: 0 | Status: ACTIVE | COMMUNITY
Start: 2021-07-22 | End: 1900-01-01

## 2021-07-22 NOTE — DATA REVIEWED
[de-identified] : MR head no contrast 6/2/21:\par 1. 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 Superimposed chronic bilateral C5-6 radiculopathy cannot be ruled out. Clinical correlation recommended.  [de-identified] : EMG/NCV 6/10/21:\par 1. 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 Superimposed chronic bilateral C5-6 radiculopathy cannot be ruled out. Clinical correlation recommended.

## 2021-07-22 NOTE — PHYSICAL EXAM
[FreeTextEntry1] : Examination:\par Constitutional: normal, no apparent distress\par Eyes: normal conjunctiva b/l, no ptosis, visual fields full\par Respiratory: no respiratory distress, normal effort, normal auscultation\par Cardiovascular: normal rate, rhythm, no murmurs\par Neck: supple, no masses\par Vascular: carotids normal\par Skin: normal color, no rashes\par Psych: normal mood, affect\par \par Neurological:\par Memory: normal memory, oriented to person, place, time\par Language intact/no aphasia\par Cranial Nerves: II-XII intact, Pupils equally round and reactive to light, ocular muscles/movements intact, no ptosis, no facial weakness, tongue protrudes normally in the midline, \par Motor: normal tone, no pronator drift, full strength in all four extremities in the proximal and distal muscle groups\par Coordination: Fine motor movements intact, rapid alternating movements intact, finger to nose intact bilaterally\par Sensory: paresthesias in right hand.\par DTRs: symmetric, normal\par Gait: narrow based, steady\par

## 2021-07-22 NOTE — HISTORY OF PRESENT ILLNESS
[FreeTextEntry1] : Initial history 5/21/21:\par Ms. Hsu is here today for neurology evaluation.\par She reports a history of carpal tunnel syndrome on the right side.\par She was given a brace to wear on the right side.\par She is recently having more difficulty.\par She reports pain that radiates up her arm and difficulty using her hand due to pain. She has difficulty writing and texting. Symptoms are worse in her 1st and 2nd digits. \par She has numbness/tingling which make it difficult to use the hand.\par \par She is scheduled for an EMG on 6/10/21.\par She is concerned that she will not be able to have surgery because of cardiac risk factors.\par She has a history of a stroke in 2019.\par \par She is also has a pain at the base of her head and radiate up the right side of her head.  \par She describes it as a sharp, electrical pain.\par This has been going on for about one month. She has been taking daily Tylenol.\par \par Interval History \par 7/22/21:\par Since Monday morning she has had a throbbing pain in her left neck and shoulder.\par At times the pain radiates into the base of her head on the left.\par She has had some relief after application of Voltaren. \par Tylenol has not helped.\par The right side of her neck is feeling better.\par \par When she had a flare of up low back pain in the winter she had good relief with a 5 day course of prednisone 5 mg.\par \par She has an appointment to see Dr. Hector tomorrow.

## 2021-07-22 NOTE — DISCUSSION/SUMMARY
[FreeTextEntry1] : Ms. Hsu is a 76 year old woman with multiple medical problems presenting with increased pain and paresthesias in her right upper extremity as well as posterior headaches radiating up the right side of her head.\par \par Right upper extremity pain\par -EMG significant for severe CTS\par -Has appointment scheduled to see Dr. Hector tomorrow\par -Still not interested in additional medication.\par \par \par Headaches\par -Possible occipital neuralgia. Now more on left. \par -May also have component of cervicogenic headaches\par -MRI head without acute pathology.\par -She is not interested in trial of gabapentin at this time.\par I am prescribing prednisone as below and if not effective, can consider occipital nerve blocks.\par \par Left sided neck pain\par -Possibly aggravated cervical radiculopathy (some evidence on EMG of right sided radiculopathy, likely has on left as well).\par -She has significant muscle spasm\par -Will give prednisone 5 mg x 5 days which she has tolerated in the past. Discussed potential side effects. She will take it on a full stomach and continue her PPI.\par If not improved will go for PT and call my office next week so we can consider other options (gabapentin, trigger point injection, etc.)\par \par f/u 3 months otherwise.\par

## 2021-07-27 ENCOUNTER — APPOINTMENT (OUTPATIENT)
Dept: MRI IMAGING | Facility: CLINIC | Age: 76
End: 2021-07-27
Payer: MEDICARE

## 2021-07-27 ENCOUNTER — OUTPATIENT (OUTPATIENT)
Dept: OUTPATIENT SERVICES | Facility: HOSPITAL | Age: 76
LOS: 1 days | End: 2021-07-27
Payer: MEDICARE

## 2021-07-27 DIAGNOSIS — Z90.49 ACQUIRED ABSENCE OF OTHER SPECIFIED PARTS OF DIGESTIVE TRACT: Chronic | ICD-10-CM

## 2021-07-27 DIAGNOSIS — Z95.5 PRESENCE OF CORONARY ANGIOPLASTY IMPLANT AND GRAFT: Chronic | ICD-10-CM

## 2021-07-27 DIAGNOSIS — Z98.890 OTHER SPECIFIED POSTPROCEDURAL STATES: Chronic | ICD-10-CM

## 2021-07-27 DIAGNOSIS — Z00.8 ENCOUNTER FOR OTHER GENERAL EXAMINATION: ICD-10-CM

## 2021-07-27 DIAGNOSIS — Z95.1 PRESENCE OF AORTOCORONARY BYPASS GRAFT: Chronic | ICD-10-CM

## 2021-07-27 PROCEDURE — 73720 MRI LWR EXTREMITY W/O&W/DYE: CPT | Mod: MH

## 2021-07-27 PROCEDURE — A9585: CPT

## 2021-07-27 PROCEDURE — 73720 MRI LWR EXTREMITY W/O&W/DYE: CPT | Mod: 26,LT,MH

## 2021-07-28 ENCOUNTER — APPOINTMENT (OUTPATIENT)
Dept: ORTHOPEDIC SURGERY | Facility: CLINIC | Age: 76
End: 2021-07-28
Payer: MEDICARE

## 2021-07-28 ENCOUNTER — NON-APPOINTMENT (OUTPATIENT)
Age: 76
End: 2021-07-28

## 2021-07-28 VITALS
HEIGHT: 65 IN | SYSTOLIC BLOOD PRESSURE: 155 MMHG | HEART RATE: 58 BPM | BODY MASS INDEX: 21.66 KG/M2 | WEIGHT: 130 LBS | DIASTOLIC BLOOD PRESSURE: 69 MMHG

## 2021-07-28 DIAGNOSIS — G56.03 CARPAL TUNNEL SYNDROM,BILATERAL UPPER LIMBS: ICD-10-CM

## 2021-07-28 PROCEDURE — 99214 OFFICE O/P EST MOD 30 MIN: CPT

## 2021-07-28 NOTE — ASSESSMENT
[FreeTextEntry1] : 76-year-old female presents with pain and paresthesias in bilateral median nerve distribution right worse than left.  isks and benefits of continued conservative treatment versus surgical intervention for her carpal tunnel release were discussed at length the patient should like to proceed with surgery on the right. To be booked for the procedure and may continue activity as tolerated until that time.

## 2021-07-28 NOTE — HISTORY OF PRESENT ILLNESS
[FreeTextEntry1] : 76-year-old female presents today for evaluation of right hand paresthesias present for over 6 months, now worsening.  She reports her symptoms occur on a daily basis, noted to be constant at this time, interfering with activities of daily living.  She reports numbness and tingling localized to the median nerve distribution but also reports occasional paresthesias of the small finger.  She has been under the care of Dr. Chao, along with Dr. Pinon, and underwent EMG revealing severe carpal tunnel syndrome on the right side.  The patient presents today to discuss surgical treatment due to the severity of her condition.  She also presents with pain localized to the base of the right thumb, but reports her symptoms are mild at this time.

## 2021-07-28 NOTE — PHYSICAL EXAM
[de-identified] : Physical exam shows the patient to be alert and oriented x3, capable of ambulation. The patient is well-developed and well-nourished in no apparent respiratory distress. Majority of the skin is intact bilaterally in the upper extremities without lymphadenopathy at the elbows.\par \par There is a negative Spurling test. There is no sensitivity or Tinel's over the axilla bilaterally in the area of the brachial plexus.\par \par The elbows have a symmetric and full range of motion with no pain upon forced flexion or extension bilaterally. There is no tenderness over the medial or lateral epicondyles bilaterally. The biceps and triceps are intact bilaterally with 5 over 5 strength. There is no joint effusion or instability of the medial and lateral collateral ligament complex bilaterally. There is no sensitivity of the median ulnar or radial nerves with provocative tests bilaterally.\par \par The wrists have a symmetric range of motion bilaterally. There is no tenderness over the scaphoid, scapholunate or lunotriquetral ligaments bilaterally. There is a negative Rodriguez test bilaterally. There is negative tenderness over the radial ulnar joint or TFCC and no evidence of instability bilaterally. No tenderness over the pisotriquetral or hamate hook or CMC joint bilaterally. There is 5 over 5 strength of the wrists bilaterally.\par \par There is a negative Finkelstein test bilaterally and no tenderness over the A1 pulleys. There is no tenderness or instability of the MP PIP or DIP joints in the digits bilaterally with no evidence of digital malrotation.\par \par There is no sensitivity or masses around the ulnar nerve at the level of the wrist bilaterally.\par \par \par There is 2+ pulses over the radial arteries bilaterally with good capillary refill.\par \par There is a positive Tinel's and a positive Phalen's test at 15 seconds on the right. There is also thenar atrophy but good opposition is present. The remaining intrinsic musculature has good strength bilaterally.\par \par Sensation is reduced in the median nerve distribution on the affected side. 2 point discrimination 6-7mm bilaterally. Ulnar nerve sensation is grossly intact

## 2021-07-30 ENCOUNTER — APPOINTMENT (OUTPATIENT)
Dept: MRI IMAGING | Facility: CLINIC | Age: 76
End: 2021-07-30

## 2021-09-11 ENCOUNTER — OUTPATIENT (OUTPATIENT)
Dept: OUTPATIENT SERVICES | Facility: HOSPITAL | Age: 76
LOS: 1 days | End: 2021-09-11
Payer: MEDICARE

## 2021-09-11 ENCOUNTER — APPOINTMENT (OUTPATIENT)
Dept: MRI IMAGING | Facility: CLINIC | Age: 76
End: 2021-09-11
Payer: MEDICARE

## 2021-09-11 DIAGNOSIS — Z98.890 OTHER SPECIFIED POSTPROCEDURAL STATES: Chronic | ICD-10-CM

## 2021-09-11 DIAGNOSIS — Z00.8 ENCOUNTER FOR OTHER GENERAL EXAMINATION: ICD-10-CM

## 2021-09-11 DIAGNOSIS — Z95.1 PRESENCE OF AORTOCORONARY BYPASS GRAFT: Chronic | ICD-10-CM

## 2021-09-11 DIAGNOSIS — Z95.5 PRESENCE OF CORONARY ANGIOPLASTY IMPLANT AND GRAFT: Chronic | ICD-10-CM

## 2021-09-11 DIAGNOSIS — Z90.49 ACQUIRED ABSENCE OF OTHER SPECIFIED PARTS OF DIGESTIVE TRACT: Chronic | ICD-10-CM

## 2021-09-11 PROCEDURE — 72141 MRI NECK SPINE W/O DYE: CPT | Mod: 26,MH

## 2021-09-11 PROCEDURE — 72141 MRI NECK SPINE W/O DYE: CPT | Mod: MH

## 2021-09-14 NOTE — H&P PST ADULT - RS GEN PE MLT RESP DETAILS PC
[facility] AMBULATORY ENCOUNTER  PAIN MANAGEMENT FOLLOW UP    PROVIDERS:     PCP: Bro Romero DO   Chiro: Dr. Carroll ([facility])  PM&R: Dr. Sanches  Psychiatrist: Dr. Kimberlee Cosby ([place])    CHIEF COMPLAINT:  Pain     SUBJECTIVE:    Kala Carolina is a 48 year old, right-handed female with a past medical history of depression, hyperthyroidism, headaches, burning mouth syndrome and chronic neck pain who was seen today as a follow up for neck pain radiating into the left shoulder which began 2 years ago without an inciting event. She was referred to clinic by Dr. Sanches for neck pain.    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 She reports that the more activity she does the worse the pain gets.  She recently underwent PT with no improvement, saw a chiropractor once with slight improvement and tried amitriptyline, but stopped it due to side effects including racing thoughts.    She was initially seen in clinic on 08/04/2021.  Initially seen in clinic on 08/04/2021.  We started her on Tylenol and ordered an MRI of the C-spine.  On 8/17/21, I performed a CT left C7/T1 IL NORA with improvement in her left upper extremity symptoms, but she continues to complain of neck pain.    Currently, the pain is located in the B neck with radiation into the left shoulder blade and B occiput. The pain is described as gnawing, burning and tingling in character. It is rated 4/10 and is constant. The pain is worse with activity and improved with rest. Patient denies numbness/tingling/weakness in the BUE, bowel/bladder incontinence, saddle anesthesia, recent fever, infection, or antibiotic use. She does reports tingling in the neck. Patient does not have a history of cancer.    She also reports constant dental pain and headaches.     PAIN COURSE AND PREVIOUS INTERVENTIONS:  Medications: -M. Relaxers: Flexeril    -Neuropathics: gabapentin (not  taking), amitriptyline (dc racing thoughts)    -NSAIDs:    -Opioids:     -Other: clonazepam, mirtazapine, Celexa (numb legs)    Interventions:  8/17/21 C7/T1 ILESI    Adjuvants:  PT: no benefit, Chiro: limited attempt to treat due to pain    BLOOD THINNING MEDICATIONS:  None    Orthopedic/Spine Surgical History:  None    Mental Health/Substance Use History:  Depression    Pertinent Comorbidities:  None    MEDICATIONS:    Current Outpatient Medications   Medication Sig Dispense Refill   • gabapentin (NEURONTIN) 100 MG capsule Take 100 mg by mouth daily.     • cetirizine (ZyrTEC) 10 MG tablet Take 10 mg by mouth daily.     • Multiple Vitamin (Multi-Vitamin) tablet Take 1 tablet by mouth daily.     • clonazePAM (KLONOPIN) 0.5 MG tablet Take 0.5 mg by mouth 3 times daily as needed for Anxiety.     • LEVOTHYROXINE SODIUM PO      • mirtazapine (REMERON) 30 MG tablet Take 30 mg by mouth nightly.       No current facility-administered medications for this visit.       PROBLEM LIST:    There is no problem list on file for this patient.      HISTORIES:    ALLERGIES:   Allergen Reactions   • Bee Sting HIVES   • Bee Venom HIVES   • Fruit & Vegetable (Food Or Med) Other (See Comments)     FRUIT SKINS-intolerance   • Hydrocodone-Acetaminophen CARDIAC DISTURBANCES   • Nuts   (Food) Other (See Comments)     Lightheadedness, dizzy   • Tramadol Other (See Comments)     Rapid heart rate, hot feeling, skin turns red        Past Medical History:   Diagnosis Date   • Depression    • Hyperthyroidism 2007        Past Surgical History:   Procedure Laterality Date   • Appendectomy  2008   • Hysterectomy  2009        Social History     Socioeconomic History   • Marital status:      Spouse name: Not on file   • Number of children: Not on file   • Years of education: Not on file   • Highest education level: Not on file   Occupational History   • Not on file   Tobacco Use   • Smoking status: Current Every Day Smoker     Packs/day: 0.50      Types: Cigarettes   • Smokeless tobacco: Never Used   Vaping Use   • Vaping Use: never used   Substance and Sexual Activity   • Alcohol use: Never   • Drug use: Never   • Sexual activity: Not on file   Other Topics Concern   • Not on file   Social History Narrative   • Not on file     Social Determinants of Health     Financial Resource Strain:    • Social Determinants: Financial Resource Strain:    Food Insecurity:    • Social Determinants: Food Insecurity:    Transportation Needs:    • Lack of Transportation (Medical):    • Lack of Transportation (Non-Medical):    Physical Activity:    • Days of Exercise per Week:    • Minutes of Exercise per Session:    Stress:    • Social Determinants: Stress:    Social Connections:    • Social Determinants: Social Connections:    Intimate Partner Violence: Not At Risk   • Social Determinants: Intimate Partner Violence Past Fear: No   • Social Determinants: Intimate Partner Violence Current Fear: No       No family history on file.    I have reviewed the family history and social history as listed in the medical record as obtained by my nursing staff and support staff and agree with their documentation.    I have reviewed the patient's pertinent medical records.    REVIEW OF SYSTEMS:   CONSTITUTIONAL: +fatigue, +decreased activity, no fever, weight changes, or drowsiness   HEENT: + tinnitus, +dental pain, no dysphagia, hearing problems or vision changes  CARDIOVASCULAR: no chest pain, palpitations or syncope   RESPIRATORY: no cough, shortness of breath or wheezing   GI: no constipation, diarrhea, nausea, or vomiting   : no incontinence, urgency, or hesitancy  MUSCULOSKELETAL: as above  INTEGUMENTARY: no hypersensitivity, discoloration, or rash   NEURO: +headaches, as above  ENDOCRINE: no temperature intolerance, polyuria, or polydipsia   HEME/LYMPH: no easy bruising, bleeding tendencies, lymphadenopathy   PSYCHIATRIC: +depression, no anxiety or sleep  problems      OBJECTIVE:    PHYSICAL EXAMINATION:   Vitals:   Visit Vitals  /60   Ht 5' 5\" (1.651 m)   Wt 52.2 kg   BMI 19.14 kg/m²       Constitutional:  Well-developed, well-nourished female in no acute distress. Gait is without antalgia, without ataxia.  She is able to heel and toe walk without difficulty.  Head: normocephalic, atraumatic  ENT: Conjunctiva non-injected  Skin: Warm, dry, intact without rash or lesion.  Psych:  The patient's mood is good.  Cardiovascular: well-perfused extremities, no peripheral edema  Pulmonary:  Non-labored respirations, no stridor noted  Abdomen: Non-distended  Neurologic: Coordination intact, Facial nerves intact.  Musculoskeletal:   DTRs: Patellar: 2/4 bilaterally, Medial Hamstrings: 0/4 bilaterally, Achilles /4 bilaterally, no clonus  Biceps: 2/4 bilaterally, Wrist Extensors: 2/4 bilaterally, Triceps: 2/4 bilaterally, Carroll's negative B    Sensation: intact to light touch and cold in all dermatomes of bilateral upper extremities    Muscle Strength:  Shoulder Abduction: 5/5 bilaterally     Biceps Flexion: 5/5 bilaterally     Wrist Extension: 5/5 bilaterally     Elbow Extension: 5/5 bilaterally     Finger Flexion: 5/5 bilaterally     Finger Abduction: 5/5 bilaterally    C-Spine: slightly forward posture, normal cervical lordosis, no rashes or scars, full AROM in all planes, +mild tenderness to palpation diffusely of paraspinal musculature and trapezius bilaterally. Negative Lhermitte's and Spurling's bilaterally.    Shoulders:  Full AROM bilaterally, negative empty can, Neer's, Oneal, apprehension, O'Briens, Scarf, Speed's bilaterally    LABORATORY DATA:    WBC (K/mcL)   Date Value   08/12/2011 9.7     HGB (g/dL)   Date Value   08/12/2011 13.6     HCT (%)   Date Value   08/12/2011 40.1     PLT (K/mcL)   Date Value   08/12/2011 268        Creatinine (mg/dL)   Date Value   08/12/2011 0.80     BUN (mg/dL)   Date Value   08/12/2011 9 (L)     GFR Estimate,   (no units)   Date Value   08/12/2011 >60     AST/SGOT (Units/L)   Date Value   08/12/2011 17     ALT/SGPT (Units/L)   Date Value   08/12/2011 34     ALK PHOSPHATASE (Units/L)   Date Value   08/12/2011 78     No results found for: INR  No results found for: HGBA1C    IMAGING STUDIES:    MRI C-Spine 8/6/21:   IMPRESSION:   Signal is demonstrated within the right transverse sinus and there is lack of a normal flow void.  This appearance could represent slow flow, but thrombosis is a less likely possibility. Consider MRV of the head with IV contrast or MRI of the brain with IV contrast for further evaluation.   Otherwise normal MRI of the brain.  No acute/subacute infarction, intracranial mass, or evidence of acute intracranial hemorrhage.   Multilevel degenerative changes are demonstrated in the cervical spine. Spinal canal stenosis is greatest at C5-C6, where it is mild to moderate.  Neural foraminal narrowing is severe bilaterally at C5-C6, the most significant locations.  Findings:  C2-C3 & C3-4: Bilateral facet hypertrophy.      CT C-Spine 6/25/21:  Impression:  Degenerative spondylosis of the cervical spine.  Multilevel osteophytic spurring and facet arthropathy.  Associated severe foraminal stenosis at C4-5.  Possible disc protrusion at C5-6 along the left foraminal aspect of the disc.    XR Cervical Spine 2-3 Views  Impression  1. No acute osseous injury identified.  2. Multilevel degenerative spondylosis primarily affecting C5-C6 level.  Further imaging evaluation could be obtained with MRI of the cervical  spine.    I personally reviewed the images pertinent to this patient's visit and concur with the radiologist.     ASSESSMENT:    1. Cervical spondylosis without myelopathy    2. Myofascial pain    3. Cervical radiculopathy       aKla Carolina is a 48 year old female with a past medical history of depression, hyperthyroidism, headaches, burning mouth syndrome and chronic neck pain who was seen today  as a follow up for neck pain radiating into the left shoulder which began 2 years ago without an inciting event.    PLAN:  RECOMMENDATIONS:  1) Medical Modalities:   -Continue Tylenol 1 g po TID.  -She is prescribed Gabapentin, but never started it.   -Discussed starting Cymbalta for depression and nerve pain. She will check with her psychiatrist, Dr. Cosby, to see if she agrees with the plan.  2) Interventional Modalities: Fluoro guided L C2-4 MBB #1. Patient does not take any blood thinners. Discussed risks and benefits with the patient including: bleeding, infection and nerve damage.  3) Behavioral Medicine Modalities: None at this time, she is addressing her depression with her Psychiatrist, but may benefit from Pain Psych in the future.   4) Other Modalities: Restart PT we will add dry needling for the neck.   5) Imaging/Labs:  None  6) Consults:  None  7) Follow Up: for Cervical MBB    Orders Placed This Encounter   • [facility] USE ROUTE TO CLINIC CODING   • SERVICE TO PHYSICAL THERAPY     Instructions provided as documented in the after visit summary.    The patient indicated understanding of the diagnosis and agreed with the plan of care.      I spent 30 minutes face-to-face with the patient, over half in discussion of the diagnosis and the importance of compliance with the treatment plan.    Leoncio Ziegler, DO  Interventional Pain Management  [facility]   clear to auscultation bilaterally/respirations non-labored/airway patent/breath sounds equal/good air movement

## 2021-10-11 ENCOUNTER — OUTPATIENT (OUTPATIENT)
Dept: OUTPATIENT SERVICES | Facility: HOSPITAL | Age: 76
LOS: 1 days | End: 2021-10-11
Payer: MEDICARE

## 2021-10-11 ENCOUNTER — APPOINTMENT (OUTPATIENT)
Dept: ULTRASOUND IMAGING | Facility: CLINIC | Age: 76
End: 2021-10-11
Payer: MEDICARE

## 2021-10-11 DIAGNOSIS — Z95.5 PRESENCE OF CORONARY ANGIOPLASTY IMPLANT AND GRAFT: Chronic | ICD-10-CM

## 2021-10-11 DIAGNOSIS — Z90.49 ACQUIRED ABSENCE OF OTHER SPECIFIED PARTS OF DIGESTIVE TRACT: Chronic | ICD-10-CM

## 2021-10-11 DIAGNOSIS — Z95.1 PRESENCE OF AORTOCORONARY BYPASS GRAFT: Chronic | ICD-10-CM

## 2021-10-11 DIAGNOSIS — Z98.890 OTHER SPECIFIED POSTPROCEDURAL STATES: Chronic | ICD-10-CM

## 2021-10-11 DIAGNOSIS — I65.29 OCCLUSION AND STENOSIS OF UNSPECIFIED CAROTID ARTERY: ICD-10-CM

## 2021-10-11 PROCEDURE — 93880 EXTRACRANIAL BILAT STUDY: CPT

## 2021-10-11 PROCEDURE — 93880 EXTRACRANIAL BILAT STUDY: CPT | Mod: 26

## 2021-10-12 ENCOUNTER — APPOINTMENT (OUTPATIENT)
Dept: NEUROLOGY | Facility: CLINIC | Age: 76
End: 2021-10-12

## 2021-10-14 ENCOUNTER — RX RENEWAL (OUTPATIENT)
Age: 76
End: 2021-10-14

## 2021-10-14 RX ORDER — LANSOPRAZOLE 30 MG/1
30 CAPSULE, DELAYED RELEASE ORAL TWICE DAILY
Qty: 180 | Refills: 0 | Status: ACTIVE | COMMUNITY
Start: 2018-12-19 | End: 1900-01-01

## 2021-10-15 ENCOUNTER — NON-APPOINTMENT (OUTPATIENT)
Age: 76
End: 2021-10-15

## 2021-11-11 ENCOUNTER — APPOINTMENT (OUTPATIENT)
Dept: CARDIOLOGY | Facility: CLINIC | Age: 76
End: 2021-11-11

## 2021-11-12 NOTE — PROGRESS NOTE ADULT - PROVIDER SPECIALTY LIST ADULT
Afib with RVR Cardiology Afib with RVR Afib with RVR Afib with RVR Afib with RVR Afib with RVR Afib with RVR Afib with RVR Afib with RVR Afib with RVR Afib with RVR Afib with RVR Afib with RVR Afib with RVR Afib with RVR

## 2022-01-14 ENCOUNTER — APPOINTMENT (OUTPATIENT)
Dept: MRI IMAGING | Facility: CLINIC | Age: 77
End: 2022-01-14
Payer: MEDICARE

## 2022-01-14 ENCOUNTER — RESULT REVIEW (OUTPATIENT)
Age: 77
End: 2022-01-14

## 2022-01-14 ENCOUNTER — OUTPATIENT (OUTPATIENT)
Dept: OUTPATIENT SERVICES | Facility: HOSPITAL | Age: 77
LOS: 1 days | End: 2022-01-14
Payer: MEDICARE

## 2022-01-14 DIAGNOSIS — Z95.5 PRESENCE OF CORONARY ANGIOPLASTY IMPLANT AND GRAFT: Chronic | ICD-10-CM

## 2022-01-14 DIAGNOSIS — Z98.890 OTHER SPECIFIED POSTPROCEDURAL STATES: Chronic | ICD-10-CM

## 2022-01-14 DIAGNOSIS — Z90.49 ACQUIRED ABSENCE OF OTHER SPECIFIED PARTS OF DIGESTIVE TRACT: Chronic | ICD-10-CM

## 2022-01-14 DIAGNOSIS — M86.172 OTHER ACUTE OSTEOMYELITIS, LEFT ANKLE AND FOOT: ICD-10-CM

## 2022-01-14 DIAGNOSIS — Z95.1 PRESENCE OF AORTOCORONARY BYPASS GRAFT: Chronic | ICD-10-CM

## 2022-01-14 PROCEDURE — 73718 MRI LOWER EXTREMITY W/O DYE: CPT | Mod: 26,LT,MH

## 2022-01-14 PROCEDURE — 73718 MRI LOWER EXTREMITY W/O DYE: CPT | Mod: MH

## 2022-01-26 ENCOUNTER — APPOINTMENT (OUTPATIENT)
Age: 77
End: 2022-01-26

## 2022-02-19 ENCOUNTER — APPOINTMENT (OUTPATIENT)
Dept: MRI IMAGING | Facility: CLINIC | Age: 77
End: 2022-02-19
Payer: MEDICARE

## 2022-02-19 ENCOUNTER — OUTPATIENT (OUTPATIENT)
Dept: OUTPATIENT SERVICES | Facility: HOSPITAL | Age: 77
LOS: 1 days | End: 2022-02-19
Payer: MEDICARE

## 2022-02-19 DIAGNOSIS — Z95.5 PRESENCE OF CORONARY ANGIOPLASTY IMPLANT AND GRAFT: Chronic | ICD-10-CM

## 2022-02-19 DIAGNOSIS — Z98.890 OTHER SPECIFIED POSTPROCEDURAL STATES: Chronic | ICD-10-CM

## 2022-02-19 DIAGNOSIS — Z95.1 PRESENCE OF AORTOCORONARY BYPASS GRAFT: Chronic | ICD-10-CM

## 2022-02-19 DIAGNOSIS — Z90.49 ACQUIRED ABSENCE OF OTHER SPECIFIED PARTS OF DIGESTIVE TRACT: Chronic | ICD-10-CM

## 2022-02-19 DIAGNOSIS — Z00.8 ENCOUNTER FOR OTHER GENERAL EXAMINATION: ICD-10-CM

## 2022-02-19 PROCEDURE — 70544 MR ANGIOGRAPHY HEAD W/O DYE: CPT | Mod: MH

## 2022-02-19 PROCEDURE — 70547 MR ANGIOGRAPHY NECK W/O DYE: CPT | Mod: 26,MH

## 2022-02-19 PROCEDURE — 70547 MR ANGIOGRAPHY NECK W/O DYE: CPT | Mod: MH

## 2022-02-19 PROCEDURE — 70544 MR ANGIOGRAPHY HEAD W/O DYE: CPT | Mod: 26,MH

## 2022-02-24 ENCOUNTER — APPOINTMENT (OUTPATIENT)
Dept: NEUROSURGERY | Facility: CLINIC | Age: 77
End: 2022-02-24
Payer: MEDICARE

## 2022-02-24 VITALS
WEIGHT: 130 LBS | BODY MASS INDEX: 21.66 KG/M2 | TEMPERATURE: 97.6 F | HEART RATE: 61 BPM | OXYGEN SATURATION: 98 % | HEIGHT: 65 IN | DIASTOLIC BLOOD PRESSURE: 75 MMHG | SYSTOLIC BLOOD PRESSURE: 155 MMHG

## 2022-02-24 PROCEDURE — 99212 OFFICE O/P EST SF 10 MIN: CPT

## 2022-02-28 NOTE — REASON FOR VISIT
[Follow-Up: _____] : a [unfilled] follow-up visit [Family Member] : family member [FreeTextEntry1] : Radha Godoy is a 77yr old female here for a follow up visit. Following up with cardiology for syncope like episodes. Do not think related to the left vertebral artery occlusion.

## 2022-02-28 NOTE — PHYSICAL EXAM
[General Appearance - Alert] : alert [General Appearance - In No Acute Distress] : in no acute distress [Person] : oriented to person [Place] : oriented to place [Time] : oriented to time [Cranial Nerves Oculomotor (III)] : extraocular motion intact [Motor Strength] : muscle strength was normal in all four extremities [Involuntary Movements] : no involuntary movements were seen

## 2022-02-28 NOTE — REVIEW OF SYSTEMS
[As Noted in HPI] : as noted in HPI [Dizziness] : dizziness [Lightheadedness] : lightheadedness [Negative] : Heme/Lymph [de-identified] : syncope

## 2022-02-28 NOTE — ASSESSMENT
[FreeTextEntry1] : Impression: 77yr old female with chief complaint of dizziness syncope like episodes \par had diagnostic cerebral angiogram done 10/15/2019 showing bilateral carotid artery stenosis mild to moderate and left vertebral artery distal occlusion\par On aspirin and plavix\par Plan:\par No need for intervention for carotid stenosis or vertebral artery occlusion\par Unlikely syncope related to these issues \par Follow up with cardiology\par No need to follow up with us unless new symptoms arise

## 2022-04-11 PROBLEM — Z11.59 SCREENING FOR VIRAL DISEASE: Status: ACTIVE | Noted: 2020-05-15

## 2022-05-09 NOTE — SPEECH LANGUAGE PATHOLOGY EVALUATION - SLP DIFFUSE LANGUAGE CHARACTERISTICS
no Detail Level: Simple Additional Notes: Patient consent was obtained to proceed with the visit and recommended plan of care after discussion of all risks and benefits, including the risks of COVID-19 exposure.

## 2022-05-18 ENCOUNTER — APPOINTMENT (OUTPATIENT)
Dept: MRI IMAGING | Facility: CLINIC | Age: 77
End: 2022-05-18
Payer: MEDICARE

## 2022-05-18 ENCOUNTER — OUTPATIENT (OUTPATIENT)
Dept: OUTPATIENT SERVICES | Facility: HOSPITAL | Age: 77
LOS: 1 days | End: 2022-05-18
Payer: MEDICARE

## 2022-05-18 DIAGNOSIS — Z95.5 PRESENCE OF CORONARY ANGIOPLASTY IMPLANT AND GRAFT: Chronic | ICD-10-CM

## 2022-05-18 DIAGNOSIS — Z90.49 ACQUIRED ABSENCE OF OTHER SPECIFIED PARTS OF DIGESTIVE TRACT: Chronic | ICD-10-CM

## 2022-05-18 DIAGNOSIS — Z00.8 ENCOUNTER FOR OTHER GENERAL EXAMINATION: ICD-10-CM

## 2022-05-18 DIAGNOSIS — Z98.890 OTHER SPECIFIED POSTPROCEDURAL STATES: Chronic | ICD-10-CM

## 2022-05-18 DIAGNOSIS — Z95.1 PRESENCE OF AORTOCORONARY BYPASS GRAFT: Chronic | ICD-10-CM

## 2022-05-18 PROCEDURE — 73718 MRI LOWER EXTREMITY W/O DYE: CPT | Mod: 26,LT,MH

## 2022-05-18 PROCEDURE — 73718 MRI LOWER EXTREMITY W/O DYE: CPT | Mod: MH

## 2022-05-24 ENCOUNTER — NON-APPOINTMENT (OUTPATIENT)
Age: 77
End: 2022-05-24

## 2022-05-24 ENCOUNTER — APPOINTMENT (OUTPATIENT)
Dept: OPHTHALMOLOGY | Facility: CLINIC | Age: 77
End: 2022-05-24
Payer: MEDICARE

## 2022-05-24 PROCEDURE — 92134 CPTRZ OPH DX IMG PST SGM RTA: CPT

## 2022-05-24 PROCEDURE — 92004 COMPRE OPH EXAM NEW PT 1/>: CPT

## 2022-08-22 NOTE — H&P PST ADULT - VISION (WITH CORRECTIVE LENSES IF THE PATIENT USUALLY WEARS THEM):
Patient Name: Sarah Weiss Date: 22   Address: [address] [place] [id number] Preferred #:    [phone]       : 5/15/1925    Allergies: ALLERGIES:  Donepezil and Rivastigmine    Ordering Physician: Cristina Hammond MD  [id number] [facility] [place] [facility] [address]  Dept: [phone]      Insurance: FSC: Payor: MEDICARE / Plan: PARTA AND B / Product Type: MEDICARE    Priority: routine    Facility: [facility]    Stop Statin Medication    See attached med list      Electronically Signed by: Cristina Hammond MD    22 1:09 PM  Authorizing Provider NPI: [id number]      
Health Care Proxy (HCP)
Partially impaired: cannot see medication labels or newsprint, but can see obstacles in path, and the surrounding layout; can count fingers at arm's length

## 2022-09-01 NOTE — ED ADULT NURSE NOTE - BREATHING, MLM
Mid-Level Procedure Text (A): After obtaining clear surgical margins the patient was sent to a mid-level provider for surgical repair.  The patient understands they will receive post-surgical care and follow-up from the mid-level provider. Spontaneous, unlabored and symmetrical

## 2022-09-13 NOTE — ED ADULT TRIAGE NOTE - LOCATION:
Procedure Note  Patient: Herber Olivares    Pre-procedure Dx: Myofascial Pain    Post-procedure Dx: Same     Procedure: Trigger Point Injection    Provider:  HAZEL Benítez    Assistants: None.    Pain score: 7/10 VNS    Description of Procedure: Trigger point injection.   The procedure was discussed with patient, including risks and benefits and written consent. The patient was placed in the sitting position. Trigger points were identified and marked during the physical exam. These injection sites were prepped with alcohol. Using sterile technique, a 27 gauge 5/8 inch needle was introduced into each upper trigger point and a 25 gauge 1.5 inch into each lower trigger points with elicitation of the patient's pain. Muscles injected included the Right lumborum quadratus and Bilateral trapezius, rhomboid, paracervicals.  A total of 20 ml 0.25% bupivacaine with without steroid was divided equally among the trigger points. Aspirations were negative for blood, CSF and air prior to injection at all sites. The needle was removed, skin cleansed and a sterile bandage was applied where needed. The patient tolerated the procedure well and no complications were encountered. Following the procedure the patient was stable. The patient was discharged home in good condition with post-procedural instructions.?????????????     Time Out: Immediately prior to the procedure, the following was verbally confirmed that there is a signed consent form and that the correct patient, planned procedure, site and side are consistent with documentation and that necessary equipment and/or blood products are available prior to the start of the case.    Estimated Blood Loss: None.    Complications: None.    Dictating Provider,     HAZEL Benítez  3:09 PM 9/13/2022   Right arm;

## 2022-11-22 ENCOUNTER — APPOINTMENT (OUTPATIENT)
Dept: OPHTHALMOLOGY | Facility: CLINIC | Age: 77
End: 2022-11-22

## 2022-11-28 ENCOUNTER — OUTPATIENT (OUTPATIENT)
Dept: OUTPATIENT SERVICES | Facility: HOSPITAL | Age: 77
LOS: 1 days | End: 2022-11-28
Payer: MEDICARE

## 2022-11-28 ENCOUNTER — APPOINTMENT (OUTPATIENT)
Dept: CT IMAGING | Facility: CLINIC | Age: 77
End: 2022-11-28

## 2022-11-28 ENCOUNTER — RESULT REVIEW (OUTPATIENT)
Age: 77
End: 2022-11-28

## 2022-11-28 DIAGNOSIS — Z95.1 PRESENCE OF AORTOCORONARY BYPASS GRAFT: Chronic | ICD-10-CM

## 2022-11-28 DIAGNOSIS — Z95.5 PRESENCE OF CORONARY ANGIOPLASTY IMPLANT AND GRAFT: Chronic | ICD-10-CM

## 2022-11-28 DIAGNOSIS — Z90.49 ACQUIRED ABSENCE OF OTHER SPECIFIED PARTS OF DIGESTIVE TRACT: Chronic | ICD-10-CM

## 2022-11-28 DIAGNOSIS — Z00.8 ENCOUNTER FOR OTHER GENERAL EXAMINATION: ICD-10-CM

## 2022-11-28 DIAGNOSIS — R11.0 NAUSEA: ICD-10-CM

## 2022-11-28 DIAGNOSIS — Z98.890 OTHER SPECIFIED POSTPROCEDURAL STATES: Chronic | ICD-10-CM

## 2022-11-28 PROCEDURE — 74174 CTA ABD&PLVS W/CONTRAST: CPT | Mod: 26,MH

## 2022-11-28 PROCEDURE — 74174 CTA ABD&PLVS W/CONTRAST: CPT | Mod: MH

## 2022-11-29 ENCOUNTER — NON-APPOINTMENT (OUTPATIENT)
Age: 77
End: 2022-11-29

## 2022-11-29 ENCOUNTER — APPOINTMENT (OUTPATIENT)
Dept: OPHTHALMOLOGY | Facility: CLINIC | Age: 77
End: 2022-11-29

## 2022-11-29 PROCEDURE — 92015 DETERMINE REFRACTIVE STATE: CPT

## 2022-11-29 PROCEDURE — 92250 FUNDUS PHOTOGRAPHY W/I&R: CPT

## 2022-11-29 PROCEDURE — 92014 COMPRE OPH EXAM EST PT 1/>: CPT

## 2022-11-29 PROCEDURE — ZZZZZ: CPT

## 2023-02-06 ENCOUNTER — OUTPATIENT (OUTPATIENT)
Dept: OUTPATIENT SERVICES | Facility: HOSPITAL | Age: 78
LOS: 1 days | End: 2023-02-06
Payer: MEDICARE

## 2023-02-06 ENCOUNTER — APPOINTMENT (OUTPATIENT)
Dept: CT IMAGING | Facility: CLINIC | Age: 78
End: 2023-02-06
Payer: MEDICARE

## 2023-02-06 DIAGNOSIS — Z95.5 PRESENCE OF CORONARY ANGIOPLASTY IMPLANT AND GRAFT: Chronic | ICD-10-CM

## 2023-02-06 DIAGNOSIS — Z98.890 OTHER SPECIFIED POSTPROCEDURAL STATES: Chronic | ICD-10-CM

## 2023-02-06 DIAGNOSIS — Z00.8 ENCOUNTER FOR OTHER GENERAL EXAMINATION: ICD-10-CM

## 2023-02-06 DIAGNOSIS — Z90.49 ACQUIRED ABSENCE OF OTHER SPECIFIED PARTS OF DIGESTIVE TRACT: Chronic | ICD-10-CM

## 2023-02-06 DIAGNOSIS — Z95.1 PRESENCE OF AORTOCORONARY BYPASS GRAFT: Chronic | ICD-10-CM

## 2023-02-06 DIAGNOSIS — Z86.73 PERSONAL HISTORY OF TRANSIENT ISCHEMIC ATTACK (TIA), AND CEREBRAL INFARCTION WITHOUT RESIDUAL DEFICITS: ICD-10-CM

## 2023-02-06 PROCEDURE — 70498 CT ANGIOGRAPHY NECK: CPT | Mod: MH

## 2023-02-06 PROCEDURE — 70496 CT ANGIOGRAPHY HEAD: CPT | Mod: 26,MH

## 2023-02-06 PROCEDURE — 70498 CT ANGIOGRAPHY NECK: CPT | Mod: 26,MH

## 2023-02-06 PROCEDURE — 70496 CT ANGIOGRAPHY HEAD: CPT | Mod: MH

## 2023-07-12 ENCOUNTER — APPOINTMENT (OUTPATIENT)
Dept: NEUROLOGY | Facility: CLINIC | Age: 78
End: 2023-07-12

## 2023-07-13 NOTE — PATIENT PROFILE ADULT - LEGAL HELP
Discharge Call Back    Person Contacted: mother    René Rollins. This is Cally Correa CMA calling from [facility].    Jennifer Hoenig, NP wanted me to call you to see how you are doing.    Patient Condition: Improved    Did your discharge instructions answer all your questions yes    If applicable, have you made a follow-up appointment or received a call for follow up? No    Recommendation: follow-up as needed    Do you have any questions about your care in the Urgent Care, pain control or discharge instructions? No    
no

## 2023-07-25 ENCOUNTER — RESULT REVIEW (OUTPATIENT)
Age: 78
End: 2023-07-25

## 2023-07-25 ENCOUNTER — APPOINTMENT (OUTPATIENT)
Dept: COLORECTAL SURGERY | Facility: CLINIC | Age: 78
End: 2023-07-25
Payer: MEDICARE

## 2023-07-25 DIAGNOSIS — R19.4 CHANGE IN BOWEL HABIT: ICD-10-CM

## 2023-07-25 DIAGNOSIS — R11.10 VOMITING, UNSPECIFIED: ICD-10-CM

## 2023-07-25 DIAGNOSIS — R63.4 ABNORMAL WEIGHT LOSS: ICD-10-CM

## 2023-07-25 DIAGNOSIS — K64.8 OTHER HEMORRHOIDS: ICD-10-CM

## 2023-07-25 PROCEDURE — 99204 OFFICE O/P NEW MOD 45 MIN: CPT | Mod: 25

## 2023-07-25 PROCEDURE — 46221 LIGATION OF HEMORRHOID(S): CPT

## 2023-08-03 ENCOUNTER — APPOINTMENT (OUTPATIENT)
Dept: CT IMAGING | Facility: CLINIC | Age: 78
End: 2023-08-03
Payer: MEDICARE

## 2023-08-03 ENCOUNTER — OUTPATIENT (OUTPATIENT)
Dept: OUTPATIENT SERVICES | Facility: HOSPITAL | Age: 78
LOS: 1 days | End: 2023-08-03
Payer: MEDICARE

## 2023-08-03 DIAGNOSIS — Z95.5 PRESENCE OF CORONARY ANGIOPLASTY IMPLANT AND GRAFT: Chronic | ICD-10-CM

## 2023-08-03 DIAGNOSIS — Z95.1 PRESENCE OF AORTOCORONARY BYPASS GRAFT: Chronic | ICD-10-CM

## 2023-08-03 DIAGNOSIS — R11.10 VOMITING, UNSPECIFIED: ICD-10-CM

## 2023-08-03 DIAGNOSIS — Z98.890 OTHER SPECIFIED POSTPROCEDURAL STATES: Chronic | ICD-10-CM

## 2023-08-03 DIAGNOSIS — Z90.49 ACQUIRED ABSENCE OF OTHER SPECIFIED PARTS OF DIGESTIVE TRACT: Chronic | ICD-10-CM

## 2023-08-03 DIAGNOSIS — R63.4 ABNORMAL WEIGHT LOSS: ICD-10-CM

## 2023-08-03 DIAGNOSIS — R19.4 CHANGE IN BOWEL HABIT: ICD-10-CM

## 2023-08-03 PROCEDURE — 74177 CT ABD & PELVIS W/CONTRAST: CPT | Mod: 26,MH

## 2023-08-03 PROCEDURE — 74177 CT ABD & PELVIS W/CONTRAST: CPT

## 2023-08-08 DIAGNOSIS — N13.30 UNSPECIFIED HYDRONEPHROSIS: ICD-10-CM

## 2023-08-19 PROBLEM — K64.8 HEMORRHOIDS, INTERNAL, WITH BLEEDING: Status: ACTIVE | Noted: 2023-08-19

## 2023-08-19 NOTE — PHYSICAL EXAM
[Abdomen Masses] : No abdominal masses [Tender] : nontender [Normal rectal exam] : exam was normal [Manually Reducible] : a manually reducible (grade III) [Tender, Swollen] : tender, swollen [Lax] : was lax [None] : there was no rectal mass  [JVD] : no jugular venous distention  [Normal Breath Sounds] : Normal breath sounds [Normal Heart Sounds] : normal heart sounds [Normal Rate and Rhythm] : normal rate and rhythm [Alert] : alert [Oriented to Person] : oriented to person [Oriented to Place] : oriented to place [Oriented to Time] : oriented to time [Calm] : calm [de-identified] : looks well nad

## 2023-08-19 NOTE — HISTORY OF PRESENT ILLNESS
[FreeTextEntry1] : 78 year old female with complaints of constipation and hemorrhoids with prolapse and bleeding. symptoms present for many years and worsening.

## 2023-08-19 NOTE — ASSESSMENT
[FreeTextEntry1] : 78 year old female with constipation and prolapsing and bleeding hemorrhoids. recommend rubber band ligation, high fiber diet, metamucil daily, sitz baths.

## 2023-08-19 NOTE — PROCEDURE
[FreeTextEntry1] : after consent, anoscopy and rubber band ligation x 2 internal hemorrhoids done, no complications

## 2023-08-22 NOTE — H&P PST ADULT - ABILITY TO HEAR (WITH HEARING AID OR HEARING APPLIANCE IF NORMALLY USED):
Detail Level: Detailed
Hide Additional Notes?: No
Mildly to Moderately Impaired: difficulty hearing in some environments or speaker may need to increase volume or speak distinctly

## 2023-08-31 NOTE — PRE-ANESTHESIA EVALUATION ADULT - HEIGHT IN INCHES
4 27-year-old female with past medical history of endometriosis, states used to drink daily but stopped, smokes marijuana, child at home recently with COVID and was experiencing vomiting, patient and fiancé started to develop symptoms over the past few days of body aches and vomiting specifically patient nonbilious nonbloody, states the symptoms persisted and worsened today so came to the emergency department.  Patient also reports that she has her diva cup for her menstruation has been stuck there for 2 days and could not get it out so wanted this to also be removed.  denies fever, chills,  cp, sob, pleuritic chest pain, palpitations, diaphoresis, cough, abd pain, diarrhea, constipation, melena/brbpr, urinary symptoms, back/ flank pain, vaginal discharge/odor, sick contacts, recent travel or rash.     on exam: Non toxic appearing pt sitting on stretcher in nad, no rash, dry mm, Tachy, radial pulses 2/4 b/l, no jvd, ctabl w/ breath sounds present b/l, no wheezing or crackles,  no accessory muscle use, no tachypnea, no stridor, bs present throughout all 4 quadrants, abd soft, nd, nt,  no rebound tenderness or guarding, no cvat, pelvic exam: ext genitalia +vaginal walls pink, no pain to palpation including over bartholin glands-no swelling/inflammation, cup removed with no difficulty, pt currently menstruationg blood noted in vaginal canal, cervix intact, closed os,  no visibale masses/lesions, no pelvic tenderness on bimanual exam, uterus smooth and within normal limits, no adenxa tenderness to palpation, no CMT, FROM of ext, no edema, no calf pain/swelling/erythema, AAOx3. No edema.     Plan: Labs, ivf, pain control, reassess.

## 2023-09-01 ENCOUNTER — APPOINTMENT (OUTPATIENT)
Dept: UROLOGY | Facility: CLINIC | Age: 78
End: 2023-09-01

## 2023-09-22 NOTE — PATIENT PROFILE ADULT - NSPROPTRIGHTNOTIFY_GEN_A_NUR
declines Subsequent Stages Histo Method Verbiage: Using a similar technique to that described above, a thin layer of tissue was removed from all areas where tumor was visible on the previous stage.  The tissue was again oriented, mapped, dyed, and processed as above.

## 2023-09-26 ENCOUNTER — APPOINTMENT (OUTPATIENT)
Dept: NEUROSURGERY | Facility: CLINIC | Age: 78
End: 2023-09-26

## 2023-10-01 PROBLEM — G56.03 CARPAL TUNNEL SYNDROME, BILATERAL UPPER LIMBS: Status: ACTIVE | Noted: 2019-02-26

## 2023-10-17 ENCOUNTER — APPOINTMENT (OUTPATIENT)
Dept: COLORECTAL SURGERY | Facility: CLINIC | Age: 78
End: 2023-10-17

## 2024-01-04 ENCOUNTER — APPOINTMENT (OUTPATIENT)
Dept: NEUROSURGERY | Facility: CLINIC | Age: 79
End: 2024-01-04
Payer: MEDICARE

## 2024-01-04 ENCOUNTER — NON-APPOINTMENT (OUTPATIENT)
Age: 79
End: 2024-01-04

## 2024-01-04 VITALS
DIASTOLIC BLOOD PRESSURE: 71 MMHG | BODY MASS INDEX: 21.66 KG/M2 | SYSTOLIC BLOOD PRESSURE: 164 MMHG | HEART RATE: 55 BPM | WEIGHT: 130 LBS | OXYGEN SATURATION: 98 % | HEIGHT: 65 IN

## 2024-01-04 PROCEDURE — 99213 OFFICE O/P EST LOW 20 MIN: CPT

## 2024-01-05 NOTE — ASSESSMENT
[FreeTextEntry1] : Impression: 78yr old female with mild carotid artery stenosis bilaterally  Diagnostic cerebral angiogram done 10/15/2019 showing bilateral carotid artery stenosis mild to moderate and left vertebral artery distal occlusion On aspirin and Plavix (plavix s/p ct surgery)  Plan:  From neurosurgical perspective she does not need to be on plavix for mild carotid stenosis From neurosurgical perspective ok to proceed with eye surgery Carotid doppler ultrasound now ttm after to review results Referred to cardiologist Dr. Vasquez  Otc Regimen: CeraVe itch relief as needed for itching Plan: Consider orthopedic evaluation for neck/back stiffness in the future Detail Level: Zone

## 2024-01-05 NOTE — REASON FOR VISIT
[Follow-Up: _____] : a [unfilled] follow-up visit [Family Member] : family member [FreeTextEntry1] : Radha is here for a follow up visit. Today she feels well no new motor sensory speech visual abnormalities. Needs clearance for upcoming eye surgery.

## 2024-01-08 ENCOUNTER — OUTPATIENT (OUTPATIENT)
Dept: OUTPATIENT SERVICES | Facility: HOSPITAL | Age: 79
LOS: 1 days | End: 2024-01-08
Payer: MEDICARE

## 2024-01-08 ENCOUNTER — APPOINTMENT (OUTPATIENT)
Dept: ULTRASOUND IMAGING | Facility: CLINIC | Age: 79
End: 2024-01-08
Payer: MEDICARE

## 2024-01-08 DIAGNOSIS — Z95.5 PRESENCE OF CORONARY ANGIOPLASTY IMPLANT AND GRAFT: Chronic | ICD-10-CM

## 2024-01-08 DIAGNOSIS — Z90.49 ACQUIRED ABSENCE OF OTHER SPECIFIED PARTS OF DIGESTIVE TRACT: Chronic | ICD-10-CM

## 2024-01-08 DIAGNOSIS — Z98.890 OTHER SPECIFIED POSTPROCEDURAL STATES: Chronic | ICD-10-CM

## 2024-01-08 DIAGNOSIS — I65.29 OCCLUSION AND STENOSIS OF UNSPECIFIED CAROTID ARTERY: ICD-10-CM

## 2024-01-08 DIAGNOSIS — Z95.1 PRESENCE OF AORTOCORONARY BYPASS GRAFT: Chronic | ICD-10-CM

## 2024-01-08 PROCEDURE — 93880 EXTRACRANIAL BILAT STUDY: CPT | Mod: 26

## 2024-01-08 PROCEDURE — 93880 EXTRACRANIAL BILAT STUDY: CPT

## 2024-01-18 ENCOUNTER — APPOINTMENT (OUTPATIENT)
Dept: NEUROSURGERY | Facility: CLINIC | Age: 79
End: 2024-01-18
Payer: MEDICARE

## 2024-01-18 DIAGNOSIS — I65.29 OCCLUSION AND STENOSIS OF UNSPECIFIED CAROTID ARTERY: ICD-10-CM

## 2024-01-18 PROCEDURE — 99441: CPT | Mod: 93

## 2024-01-19 PROBLEM — I65.29 CAROTID STENOSIS: Status: ACTIVE | Noted: 2018-10-02

## 2024-01-19 NOTE — ASSESSMENT
[FreeTextEntry1] : Impression: 78yr old female with mild carotid artery stenosis bilaterally Diagnostic cerebral angiogram done 10/15/2019 showing bilateral carotid artery stenosis mild to moderate and left vertebral artery distal occlusion On aspirin and Plavix (plavix s/p ct surgery) From neurosurgical perspective she does not need to be on plavix for mild carotid stenosis From neurosurgical perspective ok to proceed with eye surgery Carotid doppler ultrasound 1/8/2024 SHAMIKA  LICA PSV 94  Prior 2021 carotid doppler SHAMIKA  LICA PSV 65 Plan: No overt progression of the carotid stenosis no severe carotoid stenosis present repeat carotid doppler ultrasound in 1 year then ttm after

## 2024-01-19 NOTE — REASON FOR VISIT
[Follow-Up: _____] : a [unfilled] follow-up visit [Home] : at home, [unfilled] , at the time of the visit. [Medical Office: ([facility])___] : at the medical office located in  [Verbal consent obtained from patient] : the patient, [unfilled] [FreeTextEntry1] : Nolvia is on the phone for a follow up visit after having new carotid doppler ultrasound done. Today she feels well.

## 2024-05-02 RX ORDER — ONDANSETRON 8 MG/1
4 TABLET, FILM COATED ORAL ONCE
Refills: 0 | Status: DISCONTINUED | OUTPATIENT
Start: 2024-05-03 | End: 2024-05-03

## 2024-05-02 RX ORDER — FENTANYL CITRATE 50 UG/ML
50 INJECTION INTRAVENOUS
Refills: 0 | Status: DISCONTINUED | OUTPATIENT
Start: 2024-05-03 | End: 2024-05-03

## 2024-05-02 RX ORDER — THIAMINE MONONITRATE (VIT B1) 100 MG
1 TABLET ORAL
Qty: 0 | Refills: 0 | DISCHARGE

## 2024-05-02 RX ORDER — LEVOTHYROXINE SODIUM 125 MCG
1 TABLET ORAL
Qty: 0 | Refills: 0 | DISCHARGE

## 2024-05-02 RX ORDER — LANSOPRAZOLE 15 MG/1
1 CAPSULE, DELAYED RELEASE ORAL
Qty: 0 | Refills: 0 | DISCHARGE

## 2024-05-02 RX ORDER — SODIUM CHLORIDE 9 MG/ML
1000 INJECTION, SOLUTION INTRAVENOUS
Refills: 0 | Status: DISCONTINUED | OUTPATIENT
Start: 2024-05-03 | End: 2024-05-03

## 2024-05-02 NOTE — ASU PATIENT PROFILE, ADULT - NSICDXPASTSURGICALHX_GEN_ALL_CORE_FT
PAST SURGICAL HISTORY:  H/O endoscopy     History of appendectomy     History of cholecystectomy     History of hemorrhoidectomy     History of insertion of pancreatic stent     History of Mohs surgery for squamous cell carcinoma of skin     History of surgery mohs cheek    Presence of stent in anterior descending branch of left coronary artery mLAD    Presence of stent in coronary artery Left main    Presence of stent in left circumflex coronary artery pLCX    Presence of stent in right coronary artery dRCA, RPDA    S/P CABG x 3 2015, LIMA to the LAD, SVG to the Ramus, SVG to the RPDA (known to be occluded)    Status post insertion of drug-eluting stent into left anterior descending (LAD) artery for coronary SYNERGY 3.0MM X 12MM drug-eluting stent pLAD and SYNERGY 2.500MM X 28MM drug-eluting stent D1.

## 2024-05-02 NOTE — ASU PATIENT PROFILE, ADULT - FALL HARM RISK - HARM RISK INTERVENTIONS

## 2024-05-02 NOTE — ASU PATIENT PROFILE, ADULT - BRADEN SCORE (IF 18 OR LESS ACTIVATE SKIN INJURY RISK INCREASED GUIDELINE), MLM
Pt was in for a BP check yesterday   Melba noted: \"I would recommend repeat blood pressure check in 1-2 weeks. If it remains elevated would not recommend she continue with the phentermine\"    Pt calling to set up another visit     Appt set for next week   Prefers damion johnson    18

## 2024-05-02 NOTE — ASU PATIENT PROFILE, ADULT - NSICDXPASTMEDICALHX_GEN_ALL_CORE_FT
PAST MEDICAL HISTORY:  Aortic stenosis     Atherosclerotic heart disease of native coronary artery without angina pectoris     Chest pain     COPD (chronic obstructive pulmonary disease)     Coronary artery disease involving native coronary artery of native heart, angina presence unspecifie     Esophageal ulcer     Fibromyalgia     H/O cardiac murmur     H/O gastric ulcer     H/O gastroesophageal reflux (GERD)     H/O TIA (transient ischemic attack) and stroke     Hyperlipidemia, unspecified hyperlipidemia type     Hypothyroid     IBS (irritable bowel syndrome)     IBS (irritable bowel syndrome)     MVP (mitral valve prolapse)     PAD (peripheral artery disease)     Palpitations     Pancreatitis 2011    TIA (transient ischemic attack)     Uterine polyp

## 2024-05-03 ENCOUNTER — OUTPATIENT (OUTPATIENT)
Dept: INPATIENT UNIT | Facility: HOSPITAL | Age: 79
LOS: 1 days | Discharge: HOME | End: 2024-05-03
Payer: MEDICARE

## 2024-05-03 ENCOUNTER — RESULT REVIEW (OUTPATIENT)
Age: 79
End: 2024-05-03

## 2024-05-03 ENCOUNTER — TRANSCRIPTION ENCOUNTER (OUTPATIENT)
Age: 79
End: 2024-05-03

## 2024-05-03 VITALS
SYSTOLIC BLOOD PRESSURE: 156 MMHG | HEART RATE: 57 BPM | OXYGEN SATURATION: 97 % | TEMPERATURE: 98 F | RESPIRATION RATE: 15 BRPM | DIASTOLIC BLOOD PRESSURE: 57 MMHG

## 2024-05-03 VITALS
HEIGHT: 62 IN | HEART RATE: 100 BPM | OXYGEN SATURATION: 98 % | WEIGHT: 111.99 LBS | DIASTOLIC BLOOD PRESSURE: 71 MMHG | SYSTOLIC BLOOD PRESSURE: 178 MMHG | RESPIRATION RATE: 16 BRPM | TEMPERATURE: 98 F

## 2024-05-03 DIAGNOSIS — S91.301A UNSPECIFIED OPEN WOUND, RIGHT FOOT, INITIAL ENCOUNTER: ICD-10-CM

## 2024-05-03 DIAGNOSIS — Z90.49 ACQUIRED ABSENCE OF OTHER SPECIFIED PARTS OF DIGESTIVE TRACT: Chronic | ICD-10-CM

## 2024-05-03 DIAGNOSIS — Z98.890 OTHER SPECIFIED POSTPROCEDURAL STATES: Chronic | ICD-10-CM

## 2024-05-03 DIAGNOSIS — Z95.5 PRESENCE OF CORONARY ANGIOPLASTY IMPLANT AND GRAFT: Chronic | ICD-10-CM

## 2024-05-03 DIAGNOSIS — Z95.1 PRESENCE OF AORTOCORONARY BYPASS GRAFT: Chronic | ICD-10-CM

## 2024-05-03 PROCEDURE — 88305 TISSUE EXAM BY PATHOLOGIST: CPT | Mod: 26

## 2024-05-03 PROCEDURE — 73630 X-RAY EXAM OF FOOT: CPT | Mod: 26,RT

## 2024-05-03 PROCEDURE — 88305 TISSUE EXAM BY PATHOLOGIST: CPT

## 2024-05-03 PROCEDURE — 73630 X-RAY EXAM OF FOOT: CPT | Mod: RT

## 2024-05-03 RX ORDER — ASPIRIN/CALCIUM CARB/MAGNESIUM 324 MG
1 TABLET ORAL
Qty: 0 | Refills: 0 | DISCHARGE

## 2024-05-03 RX ORDER — LEVOTHYROXINE SODIUM 125 MCG
1 TABLET ORAL
Refills: 0 | DISCHARGE

## 2024-05-03 RX ORDER — ERGOCALCIFEROL 1.25 MG/1
0 CAPSULE ORAL
Refills: 0 | DISCHARGE

## 2024-05-03 RX ORDER — LANSOPRAZOLE 15 MG/1
1 CAPSULE, DELAYED RELEASE ORAL
Qty: 0 | Refills: 0 | DISCHARGE

## 2024-05-03 RX ORDER — PREGABALIN 225 MG/1
0 CAPSULE ORAL
Refills: 0 | DISCHARGE

## 2024-05-03 RX ORDER — CLOPIDOGREL BISULFATE 75 MG/1
1 TABLET, FILM COATED ORAL
Qty: 0 | Refills: 0 | DISCHARGE

## 2024-05-03 RX ORDER — AMLODIPINE BESYLATE 2.5 MG/1
1 TABLET ORAL
Qty: 0 | Refills: 0 | DISCHARGE

## 2024-05-03 RX ORDER — ROSUVASTATIN CALCIUM 5 MG/1
1 TABLET ORAL
Refills: 0 | DISCHARGE

## 2024-05-03 RX ORDER — LANSOPRAZOLE 15 MG/1
1 CAPSULE, DELAYED RELEASE ORAL
Refills: 0 | DISCHARGE

## 2024-05-03 NOTE — ASU DISCHARGE PLAN (ADULT/PEDIATRIC) - ASU DC SPECIAL INSTRUCTIONSFT
- Please f/u with Dr Layton within 1 week of discharge  - WBAT to the right Foot using the surgical shoe  - RICE therapy  - Pain meds PRN OTC

## 2024-05-03 NOTE — ASU DISCHARGE PLAN (ADULT/PEDIATRIC) - NS MD DC FALL RISK RISK
For information on Fall & Injury Prevention, visit: [email or web address] OR  [email or web address] OR  https://www.cdc.gov/steadi/patient.html

## 2024-05-08 LAB — SURGICAL PATHOLOGY STUDY: SIGNIFICANT CHANGE UP

## 2024-05-09 DIAGNOSIS — Z79.82 LONG TERM (CURRENT) USE OF ASPIRIN: ICD-10-CM

## 2024-05-09 DIAGNOSIS — M86.8X7 OTHER OSTEOMYELITIS, ANKLE AND FOOT: ICD-10-CM

## 2024-05-09 DIAGNOSIS — Z88.2 ALLERGY STATUS TO SULFONAMIDES: ICD-10-CM

## 2024-05-09 DIAGNOSIS — E78.5 HYPERLIPIDEMIA, UNSPECIFIED: ICD-10-CM

## 2024-05-09 DIAGNOSIS — I25.10 ATHEROSCLEROTIC HEART DISEASE OF NATIVE CORONARY ARTERY WITHOUT ANGINA PECTORIS: ICD-10-CM

## 2024-05-09 DIAGNOSIS — Z87.891 PERSONAL HISTORY OF NICOTINE DEPENDENCE: ICD-10-CM

## 2024-05-09 DIAGNOSIS — Z85.828 PERSONAL HISTORY OF OTHER MALIGNANT NEOPLASM OF SKIN: ICD-10-CM

## 2024-05-09 DIAGNOSIS — I35.0 NONRHEUMATIC AORTIC (VALVE) STENOSIS: ICD-10-CM

## 2024-05-09 DIAGNOSIS — I10 ESSENTIAL (PRIMARY) HYPERTENSION: ICD-10-CM

## 2024-05-09 DIAGNOSIS — Z95.1 PRESENCE OF AORTOCORONARY BYPASS GRAFT: ICD-10-CM

## 2024-05-09 DIAGNOSIS — Z95.5 PRESENCE OF CORONARY ANGIOPLASTY IMPLANT AND GRAFT: ICD-10-CM

## 2024-05-09 DIAGNOSIS — Z86.73 PERSONAL HISTORY OF TRANSIENT ISCHEMIC ATTACK (TIA), AND CEREBRAL INFARCTION WITHOUT RESIDUAL DEFICITS: ICD-10-CM

## 2024-05-09 DIAGNOSIS — Z90.49 ACQUIRED ABSENCE OF OTHER SPECIFIED PARTS OF DIGESTIVE TRACT: ICD-10-CM

## 2024-05-09 DIAGNOSIS — Z79.02 LONG TERM (CURRENT) USE OF ANTITHROMBOTICS/ANTIPLATELETS: ICD-10-CM

## 2024-05-09 DIAGNOSIS — Z88.1 ALLERGY STATUS TO OTHER ANTIBIOTIC AGENTS STATUS: ICD-10-CM

## 2024-06-27 PROBLEM — I25.10 ATHEROSCLEROTIC HEART DISEASE OF NATIVE CORONARY ARTERY WITHOUT ANGINA PECTORIS: Chronic | Status: ACTIVE | Noted: 2024-05-02

## 2024-06-27 PROBLEM — Z87.19 PERSONAL HISTORY OF OTHER DISEASES OF THE DIGESTIVE SYSTEM: Chronic | Status: ACTIVE | Noted: 2024-05-02

## 2024-06-27 PROBLEM — Z87.11 PERSONAL HISTORY OF PEPTIC ULCER DISEASE: Chronic | Status: ACTIVE | Noted: 2024-05-02

## 2024-06-27 PROBLEM — R00.2 PALPITATIONS: Chronic | Status: ACTIVE | Noted: 2024-05-02

## 2024-06-27 PROBLEM — Z86.79 PERSONAL HISTORY OF OTHER DISEASES OF THE CIRCULATORY SYSTEM: Chronic | Status: ACTIVE | Noted: 2024-05-02

## 2024-06-27 PROBLEM — N84.0 POLYP OF CORPUS UTERI: Chronic | Status: ACTIVE | Noted: 2024-05-02

## 2024-06-27 PROBLEM — K22.10 ULCER OF ESOPHAGUS WITHOUT BLEEDING: Chronic | Status: ACTIVE | Noted: 2024-05-02

## 2024-07-10 ENCOUNTER — NON-APPOINTMENT (OUTPATIENT)
Age: 79
End: 2024-07-10

## 2024-07-10 ENCOUNTER — APPOINTMENT (OUTPATIENT)
Dept: OPHTHALMOLOGY | Facility: CLINIC | Age: 79
End: 2024-07-10
Payer: MEDICARE

## 2024-07-10 PROCEDURE — 92012 INTRM OPH EXAM EST PATIENT: CPT

## 2024-08-08 ENCOUNTER — APPOINTMENT (OUTPATIENT)
Dept: NEUROLOGY | Facility: CLINIC | Age: 79
End: 2024-08-08

## 2024-08-08 PROBLEM — G70.80: Status: ACTIVE | Noted: 2024-08-08

## 2024-08-08 PROCEDURE — 99204 OFFICE O/P NEW MOD 45 MIN: CPT

## 2024-08-12 NOTE — PHYSICAL EXAM
[General Appearance - Alert] : alert [General Appearance - In No Acute Distress] : in no acute distress [Oriented To Time, Place, And Person] : oriented to person, place, and time [Impaired Insight] : insight and judgment were intact [Affect] : the affect was normal [Person] : oriented to person [Place] : oriented to place [Time] : oriented to time [Concentration Intact] : normal concentrating ability [Visual Intact] : visual attention was ~T not ~L decreased [Naming Objects] : no difficulty naming common objects [Repeating Phrases] : no difficulty repeating a phrase [Writing A Sentence] : no difficulty writing a sentence [Fluency] : fluency intact [Comprehension] : comprehension intact [Reading] : reading intact [Past History] : adequate knowledge of personal past history [Cranial Nerves Optic (II)] : visual acuity intact bilaterally,  visual fields full to confrontation, pupils equal round and reactive to light [Cranial Nerves Oculomotor (III)] : extraocular motion intact [Cranial Nerves Trigeminal (V)] : facial sensation intact symmetrically [Cranial Nerves Facial (VII)] : face symmetrical [Cranial Nerves Vestibulocochlear (VIII)] : hearing was intact bilaterally [Cranial Nerves Glossopharyngeal (IX)] : tongue and palate midline [Cranial Nerves Accessory (XI - Cranial And Spinal)] : head turning and shoulder shrug symmetric [Cranial Nerves Hypoglossal (XII)] : there was no tongue deviation with protrusion [Motor Strength] : muscle strength was normal in all four extremities [No Muscle Atrophy] : normal bulk in all four extremities [Sensation Tactile Decrease] : light touch was intact [Balance] : balance was intact [Past-pointing] : there was no past-pointing [Tremor] : no tremor present [2+] : Ankle jerk left 2+ [Plantar Reflex Right Only] : normal on the right [Plantar Reflex Left Only] : normal on the left [Sclera] : the sclera and conjunctiva were normal [PERRL With Normal Accommodation] : pupils were equal in size, round, reactive to light, with normal accommodation [Extraocular Movements] : extraocular movements were intact [Full Visual Field] : full visual field [Outer Ear] : the ears and nose were normal in appearance [Hearing Threshold Finger Rub Not [place]] : hearing was normal [Neck Appearance] : the appearance of the neck was normal [Respiration, Rhythm And Depth] : normal respiratory rhythm and effort [FreeTextEntry1] : expiration able to count to 36 on one breath [Abnormal Walk] : normal gait [Nail Clubbing] : no clubbing  or cyanosis of the fingernails [Musculoskeletal - Swelling] : no joint swelling seen [Motor Tone] : muscle strength and tone were normal [Skin Color & Pigmentation] : normal skin color and pigmentation [Skin Turgor] : normal skin turgor [] : no rash

## 2024-08-12 NOTE — HISTORY OF PRESENT ILLNESS
[FreeTextEntry1] : CC: right eye ptosis  HPI: 79-year-old woman history of macular degeneration, fibromyalgia, hypothyroidism, neuropathy, carotid stenosis cervical and lumbar radiculopathy presenting with 3 to 4 months of right eyelid closing.  She was seen by ophthalmology in July and referred for workup of myasthenia gravis. When this first started she woke up one morning and within an hour her right eye closed, this lasted about an hour. Happened again a few days later and lasted the whole day. Now it is involving the left eye. She has to hold her eye open to see. Most of the time occurs early afternoon. She has hx of dysphagia in 2019 due to a stroke, got TPA at that time. She had right hemiparesis which is not resolved. January of 2020 she started having dysphagia and she had to have swallow therapy. This lasted for a year. She still has issues swallowing meat, food has to be chopped up. In the last two months she has had difficulty swallowing her pills. She was worked up by GI and has areas of stricture. Her balance is poor, she used to see Dr. Quezada for this. She has weakness and dizziness holding her arms above her head, feels like she's going to fall. She has RLS, fatigue and heaviness in her legs, difficulty going up stairs, worse in the last few months. Her voice will get hoarse.  She is a former smoker, quit in 1989.

## 2024-08-12 NOTE — ASSESSMENT
[FreeTextEntry1] : 79-year-old woman history of macular degeneration, fibromyalgia, hypothyroidism, neuropathy, carotid stenosis cervical and lumbar radiculopathy presenting with 3 to 4 months of right eyelid closing. 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   Workup -  Will need copy of EKG and clearance from cardiologist in [facility] , Dr. Edmond Pinzon [phone], [place] - CT Chest w/wo - Consider PET scan - Consider getting SOX antibody testing if no evidence of malignancy. - EMG/NCS Treatment - Will consider initiation of mestinon or amifampridine   Follow up after testing completed.         :         Discussed common side effects of prescribed medications and potential alternatives.      Answered all questions and concerns to the best of my ability. Advised to call for any new or worsening symptoms.      In order to maintain continuity of care/prescription refills, patients must be seen on a yearly basis.   Total time spent on the day of the visit, including pre-visit and post-visit time was 50 minutes.

## 2024-08-12 NOTE — ASSESSMENT
[FreeTextEntry1] : 79-year-old woman history of macular degeneration, fibromyalgia, hypothyroidism, neuropathy, carotid stenosis cervical and lumbar radiculopathy presenting with 3 to 4 months of right eyelid closing. 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   Workup -  Will need copy of EKG and clearance from cardiologist in [facility] , Dr. Edmond Pinzon [phone], [place] - CT Chest w/wo - Consider PET scan - Consider getting SOX antibody testing if no evidence of malignancy. - EMG/NCS Treatment - Will consider initiation of mestinon or amifampridine   Follow up after testing completed.         :         Discussed common side effects of prescribed medications and potential alternatives.      Answered all questions and concerns to the best of my ability. Advised to call for any new or worsening symptoms.      In order to maintain continuity of care/prescription refills, patients must be seen on a yearly basis.   Total time spent on the day of the visit, including pre-visit and post-visit time was 50 minutes.

## 2024-08-12 NOTE — PHYSICAL EXAM
[General Appearance - Alert] : alert [General Appearance - In No Acute Distress] : in no acute distress [Oriented To Time, Place, And Person] : oriented to person, place, and time [Impaired Insight] : insight and judgment were intact [Affect] : the affect was normal [Person] : oriented to person [Place] : oriented to place [Time] : oriented to time [Concentration Intact] : normal concentrating ability [Visual Intact] : visual attention was ~T not ~L decreased [Naming Objects] : no difficulty naming common objects [Repeating Phrases] : no difficulty repeating a phrase [Writing A Sentence] : no difficulty writing a sentence [Fluency] : fluency intact [Comprehension] : comprehension intact [Reading] : reading intact [Past History] : adequate knowledge of personal past history [Cranial Nerves Optic (II)] : visual acuity intact bilaterally,  visual fields full to confrontation, pupils equal round and reactive to light [Cranial Nerves Oculomotor (III)] : extraocular motion intact [Cranial Nerves Trigeminal (V)] : facial sensation intact symmetrically [Cranial Nerves Facial (VII)] : face symmetrical [Cranial Nerves Vestibulocochlear (VIII)] : hearing was intact bilaterally [Cranial Nerves Glossopharyngeal (IX)] : tongue and palate midline [Cranial Nerves Accessory (XI - Cranial And Spinal)] : head turning and shoulder shrug symmetric [Cranial Nerves Hypoglossal (XII)] : there was no tongue deviation with protrusion [Motor Strength] : muscle strength was normal in all four extremities [No Muscle Atrophy] : normal bulk in all four extremities [Sensation Tactile Decrease] : light touch was intact [Balance] : balance was intact [Past-pointing] : there was no past-pointing [Tremor] : no tremor present [2+] : Ankle jerk left 2+ [Plantar Reflex Right Only] : normal on the right [Plantar Reflex Left Only] : normal on the left [Sclera] : the sclera and conjunctiva were normal [PERRL With Normal Accommodation] : pupils were equal in size, round, reactive to light, with normal accommodation [Extraocular Movements] : extraocular movements were intact [Full Visual Field] : full visual field [Outer Ear] : the ears and nose were normal in appearance [Hearing Threshold Finger Rub Not [place]] : hearing was normal [Neck Appearance] : the appearance of the neck was normal [Respiration, Rhythm And Depth] : normal respiratory rhythm and effort [FreeTextEntry1] : expiration able to count to 36 on one breath [Abnormal Walk] : normal gait [Nail Clubbing] : no clubbing  or cyanosis of the fingernails [Musculoskeletal - Swelling] : no joint swelling seen [Motor Tone] : muscle strength and tone were normal [Skin Color & Pigmentation] : normal skin color and pigmentation [Skin Turgor] : normal skin turgor [] : no rash

## 2024-08-19 ENCOUNTER — APPOINTMENT (OUTPATIENT)
Dept: CT IMAGING | Facility: CLINIC | Age: 79
End: 2024-08-19
Payer: MEDICARE

## 2024-08-19 ENCOUNTER — OUTPATIENT (OUTPATIENT)
Dept: OUTPATIENT SERVICES | Facility: HOSPITAL | Age: 79
LOS: 1 days | End: 2024-08-19
Payer: MEDICARE

## 2024-08-19 DIAGNOSIS — Z98.890 OTHER SPECIFIED POSTPROCEDURAL STATES: Chronic | ICD-10-CM

## 2024-08-19 DIAGNOSIS — Z95.5 PRESENCE OF CORONARY ANGIOPLASTY IMPLANT AND GRAFT: Chronic | ICD-10-CM

## 2024-08-19 DIAGNOSIS — G70.80: ICD-10-CM

## 2024-08-19 DIAGNOSIS — Z90.49 ACQUIRED ABSENCE OF OTHER SPECIFIED PARTS OF DIGESTIVE TRACT: Chronic | ICD-10-CM

## 2024-08-19 PROCEDURE — 71260 CT THORAX DX C+: CPT

## 2024-08-19 PROCEDURE — 71260 CT THORAX DX C+: CPT | Mod: 26,MH

## 2024-08-27 RX ORDER — AMIFAMPRIDINE PHOSPHATE 10 MG/1
10 TABLET ORAL TWICE DAILY
Qty: 60 | Refills: 2 | Status: ACTIVE | COMMUNITY
Start: 2024-08-27 | End: 1900-01-01

## 2024-08-27 RX ORDER — PYRIDOSTIGMINE BROMIDE 30 MG/1
30 TABLET ORAL TWICE DAILY
Qty: 60 | Refills: 3 | Status: ACTIVE | COMMUNITY
Start: 2024-08-27 | End: 1900-01-01

## 2024-08-27 NOTE — PATIENT PROFILE ADULT - EQUIPMENT CURRENTLY USED AT HOME
[facility] Attending Addendum:  Dr. Vazquez Pederson MD,  the patient and I were not physically present during this encounter.  The resident and I are concurrently monitoring the patient care through appropriate telecommunication technology.  I discussed the findings, assessment and plan with the resident and agree with the resident's findings and plan as documented in the resident's note.      Roc Pandya MD       no

## 2024-09-13 ENCOUNTER — APPOINTMENT (OUTPATIENT)
Dept: ULTRASOUND IMAGING | Facility: CLINIC | Age: 79
End: 2024-09-13
Payer: MEDICARE

## 2024-09-13 ENCOUNTER — OUTPATIENT (OUTPATIENT)
Dept: OUTPATIENT SERVICES | Facility: HOSPITAL | Age: 79
LOS: 1 days | End: 2024-09-13
Payer: MEDICARE

## 2024-09-13 DIAGNOSIS — Z98.890 OTHER SPECIFIED POSTPROCEDURAL STATES: Chronic | ICD-10-CM

## 2024-09-13 DIAGNOSIS — Z95.5 PRESENCE OF CORONARY ANGIOPLASTY IMPLANT AND GRAFT: Chronic | ICD-10-CM

## 2024-09-13 DIAGNOSIS — Z90.49 ACQUIRED ABSENCE OF OTHER SPECIFIED PARTS OF DIGESTIVE TRACT: Chronic | ICD-10-CM

## 2024-09-13 DIAGNOSIS — Z00.8 ENCOUNTER FOR OTHER GENERAL EXAMINATION: ICD-10-CM

## 2024-09-13 DIAGNOSIS — Z95.1 PRESENCE OF AORTOCORONARY BYPASS GRAFT: Chronic | ICD-10-CM

## 2024-09-13 PROCEDURE — 76770 US EXAM ABDO BACK WALL COMP: CPT

## 2024-09-13 PROCEDURE — 76770 US EXAM ABDO BACK WALL COMP: CPT | Mod: 26

## 2024-09-30 ENCOUNTER — APPOINTMENT (OUTPATIENT)
Dept: NEUROLOGY | Facility: CLINIC | Age: 79
End: 2024-09-30
Payer: MEDICARE

## 2024-09-30 VITALS
DIASTOLIC BLOOD PRESSURE: 66 MMHG | WEIGHT: 110 LBS | BODY MASS INDEX: 18.33 KG/M2 | HEART RATE: 54 BPM | SYSTOLIC BLOOD PRESSURE: 149 MMHG | HEIGHT: 65 IN | OXYGEN SATURATION: 97 %

## 2024-09-30 DIAGNOSIS — G70.80: ICD-10-CM

## 2024-09-30 DIAGNOSIS — R26.89 OTHER ABNORMALITIES OF GAIT AND MOBILITY: ICD-10-CM

## 2024-09-30 PROCEDURE — 99214 OFFICE O/P EST MOD 30 MIN: CPT

## 2024-09-30 RX ORDER — PREDNISONE 5 MG/1
5 TABLET ORAL TWICE DAILY
Qty: 60 | Refills: 0 | Status: ACTIVE | COMMUNITY
Start: 2024-09-30 | End: 1900-01-01

## 2024-09-30 NOTE — HISTORY OF PRESENT ILLNESS
[FreeTextEntry1] : Since last visit: She has been experiencing a lot of pain in in her ribs, under her arms, pelvis. She went to [facility] and had X-rays which were negative for fracture. The pain started a week after she was last here. She has felt a lot of squeezing in her chest. She also has felt very off balance. She is finding it hard to open bottles. TSH 6.50,  EBV VCA IgG. She also complains of hoarse voice.  HPI: 79-year-old woman history of macular degeneration, fibromyalgia, hypothyroidism, neuropathy, carotid stenosis cervical and lumbar radiculopathy presenting with 3 to 4 months of right eyelid closing.  She was seen by ophthalmology in July and referred for workup of myasthenia gravis. When this first started she woke up one morning and within an hour her right eye closed, this lasted about an hour. Happened again a few days later and lasted the whole day. Now it is involving the left eye. She has to hold her eye open to see. Most of the time occurs early afternoon. She has hx of dysphagia in 2019 due to a stroke, got TPA at that time. She had right hemiparesis which is not resolved. January of 2020 she started having dysphagia and she had to have swallow therapy. This lasted for a year. She still has issues swallowing meat, food has to be chopped up. In the last two months she has had difficulty swallowing her pills. She was worked up by GI and has areas of stricture. Her balance is poor, she used to see Dr. Quezada for this. She has weakness and dizziness holding her arms above her head, feels like she's going to fall. She has RLS, fatigue and heaviness in her legs, difficulty going up stairs, worse in the last few months. Her voice will get hoarse.  She is a former smoker, quit in 1989.

## 2024-09-30 NOTE — ASSESSMENT
[FreeTextEntry1] : 79-year-old woman history of macular degeneration, fibromyalgia, hypothyroidism, neuropathy, carotid stenosis cervical and lumbar radiculopathy presenting with 3 to 4 months of right eyelid closing. 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 She complains today of MSK pain in the chest and shoulders, hoarse voice, diminished   Workup -  Follow up with her PCP regarding elevated TSH - CT Chest w/wo- negative, subcentimeter small nodules. - repeat in 6 months - Consider PET scan - EMG/NCS- negative Treatment -firdapse titration every 5 mg every 4 days until 50mg max for now.  - prednisone 5mg twice daily - deep breathing 10 times multiple times a day.  - Strongly advised to call if she has any shortness of breath. Discussed that this could lead to respiratory failure if untreated.  - Physical therapy referral for gait imbalance  Follow up after testing completed.    Discussed common side effects of prescribed medications and potential alternatives.      Answered all questions and concerns to the best of my ability. Advised to call for any new or worsening symptoms.      In order to maintain continuity of care/prescription refills, patients must be seen on a yearly basis.   Total time spent on the day of the visit, including pre-visit and post-visit time was 35 minutes.

## 2024-09-30 NOTE — PHYSICAL EXAM
[General Appearance - Alert] : alert [General Appearance - In No Acute Distress] : in no acute distress [Oriented To Time, Place, And Person] : oriented to person, place, and time [Impaired Insight] : insight and judgment were intact [Affect] : the affect was normal [Person] : oriented to person [Place] : oriented to place [Time] : oriented to time [Concentration Intact] : normal concentrating ability [Visual Intact] : visual attention was ~T not ~L decreased [Naming Objects] : no difficulty naming common objects [Repeating Phrases] : no difficulty repeating a phrase [Writing A Sentence] : no difficulty writing a sentence [Fluency] : fluency intact [Comprehension] : comprehension intact [Reading] : reading intact [Past History] : adequate knowledge of personal past history [Cranial Nerves Optic (II)] : visual acuity intact bilaterally,  visual fields full to confrontation, pupils equal round and reactive to light [Cranial Nerves Oculomotor (III)] : extraocular motion intact [Cranial Nerves Trigeminal (V)] : facial sensation intact symmetrically [Cranial Nerves Facial (VII)] : face symmetrical [Cranial Nerves Vestibulocochlear (VIII)] : hearing was intact bilaterally [Cranial Nerves Glossopharyngeal (IX)] : tongue and palate midline [Cranial Nerves Accessory (XI - Cranial And Spinal)] : head turning and shoulder shrug symmetric [Cranial Nerves Hypoglossal (XII)] : there was no tongue deviation with protrusion [Motor Strength] : muscle strength was normal in all four extremities [No Muscle Atrophy] : normal bulk in all four extremities [Sensation Tactile Decrease] : light touch was intact [Balance] : balance was intact [2+] : Ankle jerk left 2+ [Sclera] : the sclera and conjunctiva were normal [PERRL With Normal Accommodation] : pupils were equal in size, round, reactive to light, with normal accommodation [Extraocular Movements] : extraocular movements were intact [Full Visual Field] : full visual field [Outer Ear] : the ears and nose were normal in appearance [Hearing Threshold Finger Rub Not [place]] : hearing was normal [Neck Appearance] : the appearance of the neck was normal [Respiration, Rhythm And Depth] : normal respiratory rhythm and effort [Nail Clubbing] : no clubbing  or cyanosis of the fingernails [Abnormal Walk] : normal gait [Musculoskeletal - Swelling] : no joint swelling seen [Motor Tone] : muscle strength and tone were normal [Skin Color & Pigmentation] : normal skin color and pigmentation [Skin Turgor] : normal skin turgor [] : no rash [Motor Handedness Right-Handed] : the patient is right hand dominant [Past-pointing] : there was no past-pointing [Tremor] : no tremor present [Plantar Reflex Right Only] : normal on the right [Plantar Reflex Left Only] : normal on the left [FreeTextEntry1] : expiration able to count to 25 on one breath, down from 36, hypophonic voice

## 2024-10-21 ENCOUNTER — NON-APPOINTMENT (OUTPATIENT)
Age: 79
End: 2024-10-21

## 2024-11-06 ENCOUNTER — APPOINTMENT (OUTPATIENT)
Dept: NEUROLOGY | Facility: CLINIC | Age: 79
End: 2024-11-06

## 2024-12-26 ENCOUNTER — APPOINTMENT (OUTPATIENT)
Dept: NEUROLOGY | Facility: CLINIC | Age: 79
End: 2024-12-26

## 2024-12-26 VITALS
DIASTOLIC BLOOD PRESSURE: 68 MMHG | BODY MASS INDEX: 18.33 KG/M2 | SYSTOLIC BLOOD PRESSURE: 152 MMHG | WEIGHT: 110 LBS | HEIGHT: 65 IN | HEART RATE: 51 BPM | OXYGEN SATURATION: 99 %

## 2024-12-26 DIAGNOSIS — R91.8 OTHER NONSPECIFIC ABNORMAL FINDING OF LUNG FIELD: ICD-10-CM

## 2024-12-26 DIAGNOSIS — G70.80: ICD-10-CM

## 2024-12-26 DIAGNOSIS — G70.9 MYONEURAL DISORDER, UNSPECIFIED: ICD-10-CM

## 2024-12-26 DIAGNOSIS — K59.00 CONSTIPATION, UNSPECIFIED: ICD-10-CM

## 2024-12-26 PROCEDURE — G2211 COMPLEX E/M VISIT ADD ON: CPT

## 2024-12-26 PROCEDURE — 99215 OFFICE O/P EST HI 40 MIN: CPT

## 2024-12-26 RX ORDER — PYRIDOSTIGMINE BROMIDE 30 MG/1
30 TABLET ORAL
Qty: 60 | Refills: 3 | Status: ACTIVE | COMMUNITY
Start: 2024-12-26 | End: 1900-01-01

## 2024-12-28 ENCOUNTER — RESULT REVIEW (OUTPATIENT)
Age: 79
End: 2024-12-28

## 2024-12-31 ENCOUNTER — OUTPATIENT (OUTPATIENT)
Dept: OUTPATIENT SERVICES | Facility: HOSPITAL | Age: 79
LOS: 1 days | End: 2024-12-31
Payer: MEDICARE

## 2024-12-31 ENCOUNTER — APPOINTMENT (OUTPATIENT)
Dept: CT IMAGING | Facility: CLINIC | Age: 79
End: 2024-12-31
Payer: MEDICARE

## 2024-12-31 DIAGNOSIS — Z95.1 PRESENCE OF AORTOCORONARY BYPASS GRAFT: Chronic | ICD-10-CM

## 2024-12-31 DIAGNOSIS — Z98.890 OTHER SPECIFIED POSTPROCEDURAL STATES: Chronic | ICD-10-CM

## 2024-12-31 DIAGNOSIS — R91.8 OTHER NONSPECIFIC ABNORMAL FINDING OF LUNG FIELD: ICD-10-CM

## 2024-12-31 DIAGNOSIS — Z95.5 PRESENCE OF CORONARY ANGIOPLASTY IMPLANT AND GRAFT: Chronic | ICD-10-CM

## 2024-12-31 DIAGNOSIS — Z90.49 ACQUIRED ABSENCE OF OTHER SPECIFIED PARTS OF DIGESTIVE TRACT: Chronic | ICD-10-CM

## 2024-12-31 DIAGNOSIS — G70.9 MYONEURAL DISORDER, UNSPECIFIED: ICD-10-CM

## 2024-12-31 DIAGNOSIS — G70.80: ICD-10-CM

## 2024-12-31 PROCEDURE — 71260 CT THORAX DX C+: CPT

## 2024-12-31 PROCEDURE — 71260 CT THORAX DX C+: CPT | Mod: 26,MH

## 2025-01-13 ENCOUNTER — APPOINTMENT (OUTPATIENT)
Dept: ULTRASOUND IMAGING | Facility: CLINIC | Age: 80
End: 2025-01-13
Payer: MEDICARE

## 2025-01-13 ENCOUNTER — OUTPATIENT (OUTPATIENT)
Dept: OUTPATIENT SERVICES | Facility: HOSPITAL | Age: 80
LOS: 1 days | End: 2025-01-13
Payer: MEDICARE

## 2025-01-13 DIAGNOSIS — I65.29 OCCLUSION AND STENOSIS OF UNSPECIFIED CAROTID ARTERY: ICD-10-CM

## 2025-01-13 DIAGNOSIS — Z95.5 PRESENCE OF CORONARY ANGIOPLASTY IMPLANT AND GRAFT: Chronic | ICD-10-CM

## 2025-01-13 DIAGNOSIS — Z98.890 OTHER SPECIFIED POSTPROCEDURAL STATES: Chronic | ICD-10-CM

## 2025-01-13 DIAGNOSIS — Z90.49 ACQUIRED ABSENCE OF OTHER SPECIFIED PARTS OF DIGESTIVE TRACT: Chronic | ICD-10-CM

## 2025-01-13 DIAGNOSIS — Z95.1 PRESENCE OF AORTOCORONARY BYPASS GRAFT: Chronic | ICD-10-CM

## 2025-01-13 PROCEDURE — 93880 EXTRACRANIAL BILAT STUDY: CPT | Mod: 26

## 2025-01-13 PROCEDURE — 93880 EXTRACRANIAL BILAT STUDY: CPT

## 2025-01-15 ENCOUNTER — APPOINTMENT (OUTPATIENT)
Dept: PULMONOLOGY | Facility: CLINIC | Age: 80
End: 2025-01-15
Payer: MEDICARE

## 2025-01-15 PROCEDURE — 94729 DIFFUSING CAPACITY: CPT | Mod: TC

## 2025-01-15 PROCEDURE — 94010 BREATHING CAPACITY TEST: CPT | Mod: TC

## 2025-01-15 PROCEDURE — 94727 GAS DIL/WSHOT DETER LNG VOL: CPT | Mod: TC

## 2025-01-29 ENCOUNTER — NON-APPOINTMENT (OUTPATIENT)
Age: 80
End: 2025-01-29

## 2025-01-29 ENCOUNTER — APPOINTMENT (OUTPATIENT)
Dept: NEUROSURGERY | Facility: CLINIC | Age: 80
End: 2025-01-29
Payer: MEDICARE

## 2025-01-29 VITALS
HEART RATE: 55 BPM | DIASTOLIC BLOOD PRESSURE: 67 MMHG | HEIGHT: 63 IN | WEIGHT: 109 LBS | OXYGEN SATURATION: 98 % | RESPIRATION RATE: 14 BRPM | BODY MASS INDEX: 19.31 KG/M2 | SYSTOLIC BLOOD PRESSURE: 172 MMHG

## 2025-01-29 DIAGNOSIS — Z87.891 PERSONAL HISTORY OF NICOTINE DEPENDENCE: ICD-10-CM

## 2025-01-29 DIAGNOSIS — I65.29 OCCLUSION AND STENOSIS OF UNSPECIFIED CAROTID ARTERY: ICD-10-CM

## 2025-01-29 PROCEDURE — 99215 OFFICE O/P EST HI 40 MIN: CPT

## 2025-03-21 ENCOUNTER — NON-APPOINTMENT (OUTPATIENT)
Age: 80
End: 2025-03-21

## 2025-04-08 ENCOUNTER — APPOINTMENT (OUTPATIENT)
Dept: NEUROLOGY | Facility: CLINIC | Age: 80
End: 2025-04-08
Payer: MEDICARE

## 2025-04-08 VITALS
SYSTOLIC BLOOD PRESSURE: 167 MMHG | OXYGEN SATURATION: 93 % | WEIGHT: 117 LBS | BODY MASS INDEX: 20.73 KG/M2 | DIASTOLIC BLOOD PRESSURE: 72 MMHG | HEART RATE: 62 BPM | HEIGHT: 63 IN

## 2025-04-08 DIAGNOSIS — G70.80: ICD-10-CM

## 2025-04-08 DIAGNOSIS — R68.2 DRY MOUTH, UNSPECIFIED: ICD-10-CM

## 2025-04-08 DIAGNOSIS — H04.123 DRY MOUTH, UNSPECIFIED: ICD-10-CM

## 2025-04-08 PROCEDURE — 99214 OFFICE O/P EST MOD 30 MIN: CPT

## 2025-06-09 ENCOUNTER — APPOINTMENT (OUTPATIENT)
Dept: CT IMAGING | Facility: CLINIC | Age: 80
End: 2025-06-09
Payer: MEDICARE

## 2025-06-09 ENCOUNTER — OUTPATIENT (OUTPATIENT)
Dept: OUTPATIENT SERVICES | Facility: HOSPITAL | Age: 80
LOS: 1 days | End: 2025-06-09
Payer: MEDICARE

## 2025-06-09 ENCOUNTER — RESULT REVIEW (OUTPATIENT)
Age: 80
End: 2025-06-09

## 2025-06-09 DIAGNOSIS — Z95.5 PRESENCE OF CORONARY ANGIOPLASTY IMPLANT AND GRAFT: Chronic | ICD-10-CM

## 2025-06-09 DIAGNOSIS — Z90.49 ACQUIRED ABSENCE OF OTHER SPECIFIED PARTS OF DIGESTIVE TRACT: Chronic | ICD-10-CM

## 2025-06-09 DIAGNOSIS — R10.84 GENERALIZED ABDOMINAL PAIN: ICD-10-CM

## 2025-06-09 DIAGNOSIS — Z98.890 OTHER SPECIFIED POSTPROCEDURAL STATES: Chronic | ICD-10-CM

## 2025-06-09 DIAGNOSIS — Z95.1 PRESENCE OF AORTOCORONARY BYPASS GRAFT: Chronic | ICD-10-CM

## 2025-06-09 PROCEDURE — 74177 CT ABD & PELVIS W/CONTRAST: CPT | Mod: MH

## 2025-06-09 PROCEDURE — 74177 CT ABD & PELVIS W/CONTRAST: CPT | Mod: 26

## 2025-06-10 ENCOUNTER — TRANSCRIPTION ENCOUNTER (OUTPATIENT)
Age: 80
End: 2025-06-10

## 2025-06-18 ENCOUNTER — APPOINTMENT (OUTPATIENT)
Dept: GASTROENTEROLOGY | Facility: CLINIC | Age: 80
End: 2025-06-18
Payer: MEDICARE

## 2025-06-18 VITALS
HEART RATE: 64 BPM | BODY MASS INDEX: 20.2 KG/M2 | RESPIRATION RATE: 14 BRPM | DIASTOLIC BLOOD PRESSURE: 73 MMHG | SYSTOLIC BLOOD PRESSURE: 153 MMHG | OXYGEN SATURATION: 96 % | HEIGHT: 63 IN | WEIGHT: 114 LBS

## 2025-06-18 PROBLEM — Z71.9 ENCOUNTER FOR CONSULTATION: Status: ACTIVE | Noted: 2025-06-18

## 2025-06-18 PROBLEM — R13.13 CRICOPHARYNGEAL DYSPHAGIA: Status: ACTIVE | Noted: 2025-06-18

## 2025-06-18 PROCEDURE — 99204 OFFICE O/P NEW MOD 45 MIN: CPT

## 2025-07-31 ENCOUNTER — APPOINTMENT (OUTPATIENT)
Dept: GASTROENTEROLOGY | Facility: CLINIC | Age: 80
End: 2025-07-31
Payer: MEDICARE

## 2025-07-31 VITALS
HEART RATE: 59 BPM | HEIGHT: 63 IN | OXYGEN SATURATION: 97 % | BODY MASS INDEX: 19.84 KG/M2 | WEIGHT: 112 LBS | SYSTOLIC BLOOD PRESSURE: 105 MMHG | DIASTOLIC BLOOD PRESSURE: 68 MMHG

## 2025-07-31 DIAGNOSIS — R13.10 DYSPHAGIA, UNSPECIFIED: ICD-10-CM

## 2025-07-31 DIAGNOSIS — K21.9 GASTRO-ESOPHAGEAL REFLUX DISEASE W/OUT ESOPHAGITIS: ICD-10-CM

## 2025-07-31 PROCEDURE — 99214 OFFICE O/P EST MOD 30 MIN: CPT

## 2025-07-31 RX ORDER — LANSOPRAZOLE
3 KIT DAILY
Qty: 1 | Refills: 2 | Status: ACTIVE | COMMUNITY
Start: 2025-07-31 | End: 1900-01-01

## 2025-08-12 ENCOUNTER — APPOINTMENT (OUTPATIENT)
Dept: GASTROENTEROLOGY | Facility: HOSPITAL | Age: 80
End: 2025-08-12

## 2025-08-12 ENCOUNTER — OUTPATIENT (OUTPATIENT)
Dept: OUTPATIENT SERVICES | Facility: HOSPITAL | Age: 80
LOS: 1 days | End: 2025-08-12
Payer: MEDICARE

## 2025-08-12 VITALS
DIASTOLIC BLOOD PRESSURE: 70 MMHG | SYSTOLIC BLOOD PRESSURE: 161 MMHG | OXYGEN SATURATION: 100 % | HEART RATE: 60 BPM | TEMPERATURE: 98 F | HEIGHT: 62 IN | WEIGHT: 110.01 LBS | RESPIRATION RATE: 14 BRPM

## 2025-08-12 DIAGNOSIS — Z95.5 PRESENCE OF CORONARY ANGIOPLASTY IMPLANT AND GRAFT: Chronic | ICD-10-CM

## 2025-08-12 DIAGNOSIS — Z98.890 OTHER SPECIFIED POSTPROCEDURAL STATES: Chronic | ICD-10-CM

## 2025-08-12 DIAGNOSIS — R13.13 DYSPHAGIA, PHARYNGEAL PHASE: ICD-10-CM

## 2025-08-12 DIAGNOSIS — Z95.1 PRESENCE OF AORTOCORONARY BYPASS GRAFT: Chronic | ICD-10-CM

## 2025-08-12 DIAGNOSIS — Z90.49 ACQUIRED ABSENCE OF OTHER SPECIFIED PARTS OF DIGESTIVE TRACT: Chronic | ICD-10-CM

## 2025-08-12 PROCEDURE — 91013 ESOPHGL MOTIL W/STIM/PERFUS: CPT | Mod: 26

## 2025-08-12 PROCEDURE — 91037 ESOPH IMPED FUNCTION TEST: CPT | Mod: 26

## 2025-08-12 PROCEDURE — 91010 ESOPHAGUS MOTILITY STUDY: CPT | Mod: 26

## 2025-08-19 ENCOUNTER — APPOINTMENT (OUTPATIENT)
Dept: GASTROENTEROLOGY | Facility: HOSPITAL | Age: 80
End: 2025-08-19

## 2025-08-21 ENCOUNTER — APPOINTMENT (OUTPATIENT)
Dept: GASTROENTEROLOGY | Facility: CLINIC | Age: 80
End: 2025-08-21
Payer: MEDICARE

## 2025-08-21 DIAGNOSIS — K22.4 DYSKINESIA OF ESOPHAGUS: ICD-10-CM

## 2025-08-21 PROCEDURE — 99214 OFFICE O/P EST MOD 30 MIN: CPT | Mod: 2W

## 2025-08-21 PROCEDURE — G2211 COMPLEX E/M VISIT ADD ON: CPT | Mod: 2W

## 2025-09-09 ENCOUNTER — APPOINTMENT (OUTPATIENT)
Dept: CT IMAGING | Facility: CLINIC | Age: 80
End: 2025-09-09
Payer: MEDICARE

## 2025-09-09 PROCEDURE — 71260 CT THORAX DX C+: CPT | Mod: 26

## (undated) DEVICE — SYR LUER LOK 50CC

## (undated) DEVICE — TUBING SUCTION CONN 6FT STERILE

## (undated) DEVICE — SYR LUER LOK 20CC

## (undated) DEVICE — FOLEY HOLDER STATLOCK 2 WAY ADULT

## (undated) DEVICE — SUCTION YANKAUER NO CONTROL VENT

## (undated) DEVICE — SYR LUER LOK 5CC